# Patient Record
Sex: FEMALE | URBAN - METROPOLITAN AREA
[De-identification: names, ages, dates, MRNs, and addresses within clinical notes are randomized per-mention and may not be internally consistent; named-entity substitution may affect disease eponyms.]

---

## 2019-03-24 ENCOUNTER — INPATIENT (INPATIENT)
Facility: HOSPITAL | Age: 84
LOS: 0 days | Discharge: HOME | End: 2019-03-25
Attending: SURGERY | Admitting: SURGERY
Payer: MEDICARE

## 2019-03-24 VITALS
HEART RATE: 108 BPM | RESPIRATION RATE: 17 BRPM | DIASTOLIC BLOOD PRESSURE: 106 MMHG | OXYGEN SATURATION: 98 % | SYSTOLIC BLOOD PRESSURE: 167 MMHG | TEMPERATURE: 98 F | HEIGHT: 62 IN | WEIGHT: 139.99 LBS

## 2019-03-24 DIAGNOSIS — S00.81XA ABRASION OF OTHER PART OF HEAD, INITIAL ENCOUNTER: ICD-10-CM
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
## 2019-03-24 DIAGNOSIS — I60.9 NONTRAUMATIC SUBARACHNOID HEMORRHAGE, UNSPECIFIED: ICD-10-CM

## 2019-03-24 DIAGNOSIS — I10 ESSENTIAL (PRIMARY) HYPERTENSION: ICD-10-CM

## 2019-03-24 LAB
ABO RH CONFIRMATION: SIGNIFICANT CHANGE UP
ALBUMIN SERPL ELPH-MCNC: 3.6 G/DL — SIGNIFICANT CHANGE UP (ref 3.3–5)
ALP SERPL-CCNC: 82 U/L — SIGNIFICANT CHANGE UP (ref 40–120)
ALT FLD-CCNC: 25 U/L — SIGNIFICANT CHANGE UP (ref 12–78)
ANION GAP SERPL CALC-SCNC: 7 MMOL/L — SIGNIFICANT CHANGE UP (ref 5–17)
APPEARANCE UR: CLEAR — SIGNIFICANT CHANGE UP
APTT BLD: 23.6 SEC — LOW (ref 27.5–36.3)
AST SERPL-CCNC: 35 U/L — SIGNIFICANT CHANGE UP (ref 15–37)
BACTERIA # UR AUTO: ABNORMAL
BASOPHILS # BLD AUTO: 0.03 K/UL — SIGNIFICANT CHANGE UP (ref 0–0.2)
BASOPHILS NFR BLD AUTO: 0.4 % — SIGNIFICANT CHANGE UP (ref 0–2)
BILIRUB SERPL-MCNC: 0.5 MG/DL — SIGNIFICANT CHANGE UP (ref 0.2–1.2)
BILIRUB UR-MCNC: NEGATIVE — SIGNIFICANT CHANGE UP
BLD GP AB SCN SERPL QL: SIGNIFICANT CHANGE UP
BUN SERPL-MCNC: 11 MG/DL — SIGNIFICANT CHANGE UP (ref 7–23)
CALCIUM SERPL-MCNC: 8.6 MG/DL — SIGNIFICANT CHANGE UP (ref 8.5–10.1)
CHLORIDE SERPL-SCNC: 103 MMOL/L — SIGNIFICANT CHANGE UP (ref 96–108)
CO2 SERPL-SCNC: 29 MMOL/L — SIGNIFICANT CHANGE UP (ref 22–31)
COLOR SPEC: YELLOW — SIGNIFICANT CHANGE UP
CREAT SERPL-MCNC: 0.64 MG/DL — SIGNIFICANT CHANGE UP (ref 0.5–1.3)
DIFF PNL FLD: ABNORMAL
EOSINOPHIL # BLD AUTO: 0.19 K/UL — SIGNIFICANT CHANGE UP (ref 0–0.5)
EOSINOPHIL NFR BLD AUTO: 2.8 % — SIGNIFICANT CHANGE UP (ref 0–6)
EPI CELLS # UR: SIGNIFICANT CHANGE UP
GLUCOSE SERPL-MCNC: 101 MG/DL — HIGH (ref 70–99)
GLUCOSE UR QL: NEGATIVE MG/DL — SIGNIFICANT CHANGE UP
HCT VFR BLD CALC: 38.7 % — SIGNIFICANT CHANGE UP (ref 34.5–45)
HGB BLD-MCNC: 13 G/DL — SIGNIFICANT CHANGE UP (ref 11.5–15.5)
IMM GRANULOCYTES NFR BLD AUTO: 0.4 % — SIGNIFICANT CHANGE UP (ref 0–1.5)
INR BLD: 1.07 RATIO — SIGNIFICANT CHANGE UP (ref 0.88–1.16)
KETONES UR-MCNC: NEGATIVE — SIGNIFICANT CHANGE UP
LEUKOCYTE ESTERASE UR-ACNC: ABNORMAL
LYMPHOCYTES # BLD AUTO: 1.57 K/UL — SIGNIFICANT CHANGE UP (ref 1–3.3)
LYMPHOCYTES # BLD AUTO: 23.5 % — SIGNIFICANT CHANGE UP (ref 13–44)
MCHC RBC-ENTMCNC: 30.6 PG — SIGNIFICANT CHANGE UP (ref 27–34)
MCHC RBC-ENTMCNC: 33.6 GM/DL — SIGNIFICANT CHANGE UP (ref 32–36)
MCV RBC AUTO: 91.1 FL — SIGNIFICANT CHANGE UP (ref 80–100)
MONOCYTES # BLD AUTO: 0.55 K/UL — SIGNIFICANT CHANGE UP (ref 0–0.9)
MONOCYTES NFR BLD AUTO: 8.2 % — SIGNIFICANT CHANGE UP (ref 2–14)
NEUTROPHILS # BLD AUTO: 4.31 K/UL — SIGNIFICANT CHANGE UP (ref 1.8–7.4)
NEUTROPHILS NFR BLD AUTO: 64.7 % — SIGNIFICANT CHANGE UP (ref 43–77)
NITRITE UR-MCNC: POSITIVE
NRBC # BLD: 0 /100 WBCS — SIGNIFICANT CHANGE UP (ref 0–0)
PH UR: 8 — SIGNIFICANT CHANGE UP (ref 5–8)
PLATELET # BLD AUTO: 253 K/UL — SIGNIFICANT CHANGE UP (ref 150–400)
POTASSIUM SERPL-MCNC: 3.6 MMOL/L — SIGNIFICANT CHANGE UP (ref 3.5–5.3)
POTASSIUM SERPL-SCNC: 3.6 MMOL/L — SIGNIFICANT CHANGE UP (ref 3.5–5.3)
PROT SERPL-MCNC: 7.6 GM/DL — SIGNIFICANT CHANGE UP (ref 6–8.3)
PROT UR-MCNC: 30 MG/DL
PROTHROM AB SERPL-ACNC: 11.9 SEC — SIGNIFICANT CHANGE UP (ref 10–12.9)
RBC # BLD: 4.25 M/UL — SIGNIFICANT CHANGE UP (ref 3.8–5.2)
RBC # FLD: 13.1 % — SIGNIFICANT CHANGE UP (ref 10.3–14.5)
RBC CASTS # UR COMP ASSIST: ABNORMAL /HPF (ref 0–4)
SODIUM SERPL-SCNC: 139 MMOL/L — SIGNIFICANT CHANGE UP (ref 135–145)
SP GR SPEC: 1.01 — SIGNIFICANT CHANGE UP (ref 1.01–1.02)
TYPE + AB SCN PNL BLD: SIGNIFICANT CHANGE UP
UROBILINOGEN FLD QL: NEGATIVE MG/DL — SIGNIFICANT CHANGE UP
WBC # BLD: 6.68 K/UL — SIGNIFICANT CHANGE UP (ref 3.8–10.5)
WBC # FLD AUTO: 6.68 K/UL — SIGNIFICANT CHANGE UP (ref 3.8–10.5)
WBC UR QL: SIGNIFICANT CHANGE UP

## 2019-03-24 PROCEDURE — 74176 CT ABD & PELVIS W/O CONTRAST: CPT | Mod: 26

## 2019-03-24 PROCEDURE — 99291 CRITICAL CARE FIRST HOUR: CPT

## 2019-03-24 PROCEDURE — 71250 CT THORAX DX C-: CPT | Mod: 26

## 2019-03-24 PROCEDURE — 70496 CT ANGIOGRAPHY HEAD: CPT | Mod: 26

## 2019-03-24 PROCEDURE — 70486 CT MAXILLOFACIAL W/O DYE: CPT | Mod: 26

## 2019-03-24 PROCEDURE — 70498 CT ANGIOGRAPHY NECK: CPT | Mod: 26

## 2019-03-24 PROCEDURE — 72125 CT NECK SPINE W/O DYE: CPT | Mod: 26

## 2019-03-24 PROCEDURE — 99223 1ST HOSP IP/OBS HIGH 75: CPT

## 2019-03-24 PROCEDURE — 93010 ELECTROCARDIOGRAM REPORT: CPT

## 2019-03-24 PROCEDURE — 70450 CT HEAD/BRAIN W/O DYE: CPT | Mod: 26,77

## 2019-03-24 PROCEDURE — 70450 CT HEAD/BRAIN W/O DYE: CPT | Mod: 26,59

## 2019-03-24 RX ORDER — BACITRACIN ZINC 500 UNIT/G
1 OINTMENT IN PACKET (EA) TOPICAL
Refills: 0 | Status: DISCONTINUED | OUTPATIENT
Start: 2019-03-24 | End: 2019-03-25

## 2019-03-24 RX ORDER — NICARDIPINE HYDROCHLORIDE 30 MG/1
5 CAPSULE, EXTENDED RELEASE ORAL
Qty: 40 | Refills: 0 | Status: DISCONTINUED | OUTPATIENT
Start: 2019-03-24 | End: 2019-03-24

## 2019-03-24 RX ORDER — ONDANSETRON 8 MG/1
4 TABLET, FILM COATED ORAL EVERY 6 HOURS
Refills: 0 | Status: DISCONTINUED | OUTPATIENT
Start: 2019-03-24 | End: 2019-03-25

## 2019-03-24 RX ORDER — METOPROLOL TARTRATE 50 MG
5 TABLET ORAL ONCE
Refills: 0 | Status: COMPLETED | OUTPATIENT
Start: 2019-03-24 | End: 2019-03-24

## 2019-03-24 RX ORDER — HYDRALAZINE HCL 50 MG
10 TABLET ORAL EVERY 6 HOURS
Refills: 0 | Status: DISCONTINUED | OUTPATIENT
Start: 2019-03-24 | End: 2019-03-25

## 2019-03-24 RX ORDER — ACETAMINOPHEN 500 MG
650 TABLET ORAL EVERY 6 HOURS
Refills: 0 | Status: DISCONTINUED | OUTPATIENT
Start: 2019-03-24 | End: 2019-03-25

## 2019-03-24 RX ORDER — SODIUM CHLORIDE 9 MG/ML
1000 INJECTION INTRAMUSCULAR; INTRAVENOUS; SUBCUTANEOUS
Refills: 0 | Status: DISCONTINUED | OUTPATIENT
Start: 2019-03-24 | End: 2019-03-25

## 2019-03-24 RX ORDER — TETANUS TOXOID, REDUCED DIPHTHERIA TOXOID AND ACELLULAR PERTUSSIS VACCINE, ADSORBED 5; 2.5; 8; 8; 2.5 [IU]/.5ML; [IU]/.5ML; UG/.5ML; UG/.5ML; UG/.5ML
0.5 SUSPENSION INTRAMUSCULAR ONCE
Refills: 0 | Status: COMPLETED | OUTPATIENT
Start: 2019-03-24 | End: 2019-03-24

## 2019-03-24 RX ORDER — SODIUM CHLORIDE 9 MG/ML
1000 INJECTION INTRAMUSCULAR; INTRAVENOUS; SUBCUTANEOUS ONCE
Refills: 0 | Status: COMPLETED | OUTPATIENT
Start: 2019-03-24 | End: 2019-03-24

## 2019-03-24 RX ADMIN — TETANUS TOXOID, REDUCED DIPHTHERIA TOXOID AND ACELLULAR PERTUSSIS VACCINE, ADSORBED 0.5 MILLILITER(S): 5; 2.5; 8; 8; 2.5 SUSPENSION INTRAMUSCULAR at 16:36

## 2019-03-24 RX ADMIN — Medication 1 APPLICATION(S): at 20:34

## 2019-03-24 RX ADMIN — ONDANSETRON 4 MILLIGRAM(S): 8 TABLET, FILM COATED ORAL at 20:24

## 2019-03-24 RX ADMIN — SODIUM CHLORIDE 1000 MILLILITER(S): 9 INJECTION INTRAMUSCULAR; INTRAVENOUS; SUBCUTANEOUS at 15:13

## 2019-03-24 RX ADMIN — SODIUM CHLORIDE 75 MILLILITER(S): 9 INJECTION INTRAMUSCULAR; INTRAVENOUS; SUBCUTANEOUS at 20:23

## 2019-03-24 RX ADMIN — SODIUM CHLORIDE 1000 MILLILITER(S): 9 INJECTION INTRAMUSCULAR; INTRAVENOUS; SUBCUTANEOUS at 16:13

## 2019-03-24 RX ADMIN — Medication 10 MILLIGRAM(S): at 21:10

## 2019-03-24 RX ADMIN — Medication 5 MILLIGRAM(S): at 17:21

## 2019-03-24 NOTE — ED PROVIDER NOTE - SKIN, MLM
Skin normal color for race, warm, dry. + laceration above left eye, v-shaped 3cm. 1 cm avulsion on top of laceration. 2cm diameter abrasion on left cheek. 1 cm diameter abrasion on left patella

## 2019-03-24 NOTE — ED ADULT NURSE REASSESSMENT NOTE - NS ED NURSE REASSESS COMMENT FT1
MD [facility] Plastic Surgery completed suturing patient's laceration at 1840. HIRO Hylton came down to retrieve pt to bring to ICU, transport at bedside. Pt on monitor. Going up to ICU now. VSS.
Pt awaiting admission to ICU. ICU RN to call ED RN back after settling a new pt in, 1st attempt at 1725. Pt given metoprolol IVP as per orders. PT toileted and positioned. Pt's VS currently stable. Pt aware of POC to be admitted to ICU. Pt's L eye slightly sluggish, pt states this is chronic and she's been seeing an eye doctor since she was little d/t this condition. Son at bedside not sure of condition. Pt currently A+Ox2. Pt has equal movement of all 4 extremities. No neurological deficits in extremities or muscle weakness. Pt without any facial droop currently.
Pt brought to CT scan at 1440, however scan delayed by code stroke which was initiated at the same time as pt being brought over to CT scan. Pt went on the CT scan table at 1500, however pt began saying she had to urinate and demanded to be brought back to the room to use the bed pan. Pt assisted with the bedpan. Pt allowed RN to bring her back to CT scan at 1535 after being cleaned up. MD Fordeill aware of delay. RN to continue to monitor.

## 2019-03-24 NOTE — ED ADULT TRIAGE NOTE - CHIEF COMPLAINT QUOTE
pt BIBEMS from home s/p fall on sidewalk, + head strike, unknown loc. + abrasion to L cheek, L upper lip. lac above L eye, bleeding controlled. pt cannot recall falling, neighbors called EMS. pt a&ox2, disoriented to time, forgetful w/ repetitive questions. unknown blood thinners. c/o pain to L upper lip. trauma alert activated upon arrival.

## 2019-03-24 NOTE — ED PROVIDER NOTE - EYES, MLM
Clear bilaterally, pupils equal, round and reactive to light. EOMI. +at baseline, cannot look to the left past midline with her left eye

## 2019-03-24 NOTE — ED ADULT NURSE NOTE - NSIMPLEMENTINTERV_GEN_ALL_ED
Implemented All Fall with Harm Risk Interventions:  [place] to call system. Call bell, personal items and telephone within reach. Instruct patient to call for assistance. Room bathroom lighting operational. Non-slip footwear when patient is off stretcher. Physically safe environment: no spills, clutter or unnecessary equipment. Stretcher in lowest position, wheels locked, appropriate side rails in place. Provide visual cue, wrist band, yellow gown, etc. Monitor gait and stability. Monitor for mental status changes and reorient to person, place, and time. Review medications for side effects contributing to fall risk. Reinforce activity limits and safety measures with patient and family. Provide visual clues: red socks.

## 2019-03-24 NOTE — CONSULT NOTE ADULT - SUBJECTIVE AND OBJECTIVE BOX
HPI:  Patient is a 82 y/o female with a PMHx of HTN presents to the ED s/p fall today. Pt states that she was walking to her car in her driveway when she fell onto her face. GCS 15 E:4 M:6 V5. Patient is unsure if she had LOC. She is unsure if she passed out prior to her fall or if she tripped. She states the next thing she remembered was her neighbor coming over and helping her into the house, and called 911.  Pt presenting with multiple abrasions on her face and a laceration above left eye. Pt states that she does not have a hx of falls and has never had this type of fall before. She endorses L knee pain, + dizziness. She denies HA, n/v, numb/ ting, weakness, visual changes.. Denies ASA or DOAC use.      PAST MEDICAL & SURGICAL HISTORY:  HTN    FAMILY HISTORY: non-contributory     Social Hx:  Nonsmoker, no drug or alcohol use    MEDICATIONS  (STANDING):  diphtheria/tetanus/pertussis (acellular) Vaccine (ADAcel) 0.5 milliLiter(s) IntraMuscular once  niCARdipine Infusion 5 mG/Hr (25 mL/Hr) IV Continuous <Continuous>       Allergies  Allergy Status Unknown  Intolerances      ROS: Pertinent positives in HPI, all other ROS were reviewed and are negative.      Vital Signs Last 24 Hrs  T(C): 36.7 (24 Mar 2019 16:05), Max: 36.8 (24 Mar 2019 14:14)  T(F): 98.1 (24 Mar 2019 16:05), Max: 98.3 (24 Mar 2019 14:14)  HR: 96 (24 Mar 2019 16:05) (96 - 108)  BP: 167/97 (24 Mar 2019 16:05) (167/97 - 167/106)  BP(mean): --  RR: 18 (24 Mar 2019 16:05) (17 - 18)  SpO2: 99% (24 Mar 2019 16:05) (98% - 99%)      PHYSICAL EXAM:  Constitutional: awake and alert  HEENT: PERRL, L eye strabismus since age 8, + head wrap for L forehead laceration, L periorbital ecchymosis, facial abrasions  Neck: Supple  Respiratory: Breath sounds are clear bilaterally  Cardiovascular: S1 and S2, regular rhythm  Gastrointestinal: soft, nontender  Extremities:  no edema  Vascular: Caritid Bruit - no  Musculoskeletal: + abrasion L knee, mild tenderness to palpation, no abnormal movements  Skin: L forehead laceration, L periorbital ecchymosis, L perioral, L cheek abrasion     Neurological exam:  HF: A x O x 2-3 unsure on year. Appropriately interactive, normal affect. Speech fluent, No Aphasia or paraphasic errors. Naming /repetition intact   CN: LIZA, L eye stabismus/gaze palsy, VFF, facial sensation normal, no NLFD, tongue midline, Palate moves equally, SCM equal bilaterally  Motor: No pronator drift, Strength 5/5 in all 4 ext, normal bulk and tone, no tremor, rigidity or bradykinesia.    Sens: Intact to light touch   Reflexes: Symmetric and normal, downgoing toes b/l  Coord:  No FNFA, dysmetria, LUMA intact   Gait/Balance: Cannot test        Labs:                        13.0   6.68  )-----------( 253      ( 24 Mar 2019 14:29 )             38.7     03-24    139  |  103  |  11  ----------------------------<  101<H>  3.6   |  29  |  0.64    Ca    8.6      24 Mar 2019 14:29    TPro  7.6  /  Alb  3.6  /  TBili  0.5  /  DBili  x   /  AST  35  /  ALT  25  /  AlkPhos  82  03-24        PT/INR - ( 24 Mar 2019 14:29 )   PT: 11.9 sec;   INR: 1.07 ratio         PTT - ( 24 Mar 2019 14:29 )  PTT:23.6 sec    Radiology report:  CT Head No Cont (03.24.19 @ 15:42)  There is evidence of subarachnoid hemorrhage seen involving the right   sylvian fissure as well as right frontal sulci. Subarachnoid hemorrhage   is also seen involving the anterior interhemispheric region. This finding   could be related to patient's known trauma though the possibility of a   right MCA aneurysm rupture cannot be entirely excluded. Clinical   correlation is recommended. CTA or MRA the [place] can be done for   further evaluation if clinically indicated. HPI:  Patient is a 82 y/o female with a PMHx of HTN presents to the ED s/p fall today. Pt states that she was walking to her car in her driveway when she fell onto her face. GCS 15 E:4 M:6 V5. Patient is unsure if she had LOC. She is unsure if she passed out prior to her fall or if she tripped. She states the next thing she remembered was her neighbor coming over and helping her into the house, and called 911.  Pt presenting with multiple abrasions on her face and a laceration above left eye. Pt states that she does not have a hx of falls and has never had this type of fall before. She endorses L knee pain, + dizziness. She denies HA, n/v, numb/ ting, weakness, visual changes.. Denies ASA or DOAC use.      PAST MEDICAL & SURGICAL HISTORY:  HTN    FAMILY HISTORY: non-contributory     Social Hx:  Nonsmoker, no drug or alcohol use    MEDICATIONS  (STANDING):  diphtheria/tetanus/pertussis (acellular) Vaccine (ADAcel) 0.5 milliLiter(s) IntraMuscular once  niCARdipine Infusion 5 mG/Hr (25 mL/Hr) IV Continuous <Continuous>    Home Med: Amlodipine/Valsartan 5-160mg 1/2 tablet Qpm    Allergies  Allergy Status Unknown  Intolerances      ROS: Pertinent positives in HPI, all other ROS were reviewed and are negative.      Vital Signs Last 24 Hrs  T(C): 36.7 (24 Mar 2019 16:05), Max: 36.8 (24 Mar 2019 14:14)  T(F): 98.1 (24 Mar 2019 16:05), Max: 98.3 (24 Mar 2019 14:14)  HR: 96 (24 Mar 2019 16:05) (96 - 108)  BP: 167/97 (24 Mar 2019 16:05) (167/97 - 167/106)  BP(mean): --  RR: 18 (24 Mar 2019 16:05) (17 - 18)  SpO2: 99% (24 Mar 2019 16:05) (98% - 99%)      PHYSICAL EXAM:  Constitutional: awake and alert  HEENT: PERRL, L eye strabismus since age 8, + head wrap for L forehead laceration, L periorbital ecchymosis, facial abrasions  Neck: Supple  Respiratory: Breath sounds are clear bilaterally  Cardiovascular: S1 and S2, regular rhythm  Gastrointestinal: soft, nontender  Extremities:  no edema  Vascular: Caritid Bruit - no  Musculoskeletal: + abrasion L knee, mild tenderness to palpation, no abnormal movements  Skin: L forehead laceration, L periorbital ecchymosis, L perioral, L cheek abrasion     Neurological exam:  HF: A x O x 2-3 unsure on year. Appropriately interactive, normal affect. Speech fluent, No Aphasia or paraphasic errors. Naming /repetition intact   CN: LIZA, L eye stabismus/gaze palsy, VFF, facial sensation normal, no NLFD, tongue midline, Palate moves equally, SCM equal bilaterally  Motor: No pronator drift, Strength 5/5 in all 4 ext, normal bulk and tone, no tremor, rigidity or bradykinesia.    Sens: Intact to light touch   Reflexes: Symmetric and normal, downgoing toes b/l  Coord:  No FNFA, dysmetria, LUMA intact   Gait/Balance: Cannot test        Labs:                        13.0   6.68  )-----------( 253      ( 24 Mar 2019 14:29 )             38.7     03-24    139  |  103  |  11  ----------------------------<  101<H>  3.6   |  29  |  0.64    Ca    8.6      24 Mar 2019 14:29    TPro  7.6  /  Alb  3.6  /  TBili  0.5  /  DBili  x   /  AST  35  /  ALT  25  /  AlkPhos  82  03-24        PT/INR - ( 24 Mar 2019 14:29 )   PT: 11.9 sec;   INR: 1.07 ratio         PTT - ( 24 Mar 2019 14:29 )  PTT:23.6 sec    Radiology report:  CT Head No Cont (03.24.19 @ 15:42)  There is evidence of subarachnoid hemorrhage seen involving the right   sylvian fissure as well as right frontal sulci. Subarachnoid hemorrhage   is also seen involving the anterior interhemispheric region. This finding   could be related to patient's known trauma though the possibility of a   right MCA aneurysm rupture cannot be entirely excluded. Clinical   correlation is recommended. CTA or MRA the [place] can be done for   further evaluation if clinically indicated.

## 2019-03-24 NOTE — ED PROVIDER NOTE - PROGRESS NOTE DETAILS
Lius PEACE for ED attending, Dr. Hart: Spoke with Dr. Alcaraz, trauma surgery, who states pt can be admitted under his service to the ICU. Will contact intensivist. I Rivas Antunez attest that this documentation has been prepared under the direction and in the presence of Doctor Hart.

## 2019-03-24 NOTE — ED ADULT NURSE NOTE - NSFALLRSKHRMRISKTYPE_ED_ALL_ED
Miss Dozier presents for evaluation of abdominal pain. She states the pain is 10/10. She describes the pain as a dull and sharp pain without localization. She reports lower back pain. She denies fever or chills. She reports hematuria but denies dysuria. She denies diarrhea or abdominal distention. She reports hematochezia. She reports being recently admitted for abdominal pain and was discharged in good condition but the pain recurred. She was prescribed Tramadol. 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 She has history of ureteral stricture and underwent ureteral stent placement at [facility] 6/27 with Dr. Bocanegra.  
bones(Osteoporosis,prev fx,steroid use,metastatic bone ca)/coagulation(Bleeding disorder R/T clinical cond/anti-coags)

## 2019-03-24 NOTE — ED PROVIDER NOTE - CARE PLAN
Principal Discharge DX:	SAH (subarachnoid hemorrhage)  Secondary Diagnosis:	Abrasion of forehead, initial encounter

## 2019-03-24 NOTE — ED PROVIDER NOTE - CRITICAL CARE PROVIDED
direct patient care (not related to procedure)/additional history taking/interpretation of diagnostic studies/documentation/consultation with other physicians/conducted a detailed discussion of DNR status/consult w/ pt's family directly relating to pts condition

## 2019-03-24 NOTE — H&P ADULT - HISTORY OF PRESENT ILLNESS
· Chief Complaint: The patient is a 83y Female complaining of fall.	  · HPI Objective Statement: 82 y/o female with a PMHx of HTN presents to the ED s/p fall today. Pt states that she was walking to her car in a parking lot when she fell onto her face. Pt denies LOC, although amnestic to the incident. Pt presenting with multiple abrasions on her face and a laceration above left eye. Pt states that she does not have a hx of falls and has never had this type of fall before. Denies HA. Pt is unsure if her tetanus is UTD.	  · Presenting Symptoms: ABRASION, LACERATION, +multiple abrasions on face, laceration above left eye	  · Duration: today	  · Fall Cause: slipping, stumbling. tripping	  · Relieving Factors: none	  At the time of exam the patient was alert, cooperative, GCS-15.

## 2019-03-24 NOTE — ED ADULT NURSE NOTE - OBJECTIVE STATEMENT
Pt BIBA s/p fall, as per pt, pt was getting out of car and fell out of nowhere. Pt doesn't remember event, +LOC. Pt takes anticoagulation daily, unsure what medication. Pt now presents with +wounds with blood all over face, laceration to upper L lip and L knee abrasion. Pt currently A+Ox2, unsure what year it is. Knows where she is and who she is. See trauma flowsheet.

## 2019-03-24 NOTE — CONSULT NOTE ADULT - ASSESSMENT
Patient is a 84 y/o female with a PMHx of HTN presents to the ED s/p fall today. Possible LOC. HCT revealed R SAH in sylvian fissure, R frontal sulci and anterior interhemispheric region, likely traumatic.    Plan:  - No acute neurosurgical intervention at this time  - SBP <160  - CTA Head and Neck r/o aneurysm  - RHCT in 6 hours  - Trauma consult  - Rec XR L knee r/o fx  - L forehead laceration will need suture repair    Discussed with Dr. Augustin who is in agreement with the plan Patient is a 84 y/o female with a PMHx of HTN presents to the ED s/p fall today. Possible LOC. HCT revealed R SAH in sylvian fissure, R frontal sulci and anterior interhemispheric region, likely traumatic.    Plan:  - No acute neurosurgical intervention at this time  - SBP <160  - Neuro checks Q1hr for now. If RHCT in 6 hrs stable, ok to change to Q2hrs  - CTA Head and Neck r/o aneurysm  - If CTA negative ok for liquid diet, advance as tolerated  - RHCT in 6 hours  - Trauma consult  - L forehead laceration will need suture repair    Discussed with Dr. Augustin who is in agreement with the plan

## 2019-03-24 NOTE — CONSULT NOTE ADULT - ATTENDING COMMENTS
Case reviewed with Neurosurgery PA.  History of fall with GCS=15.  CT Brain Reviewed, shows post traumatic right subarachnoid hemorrhage.  Followup CT brain pending.

## 2019-03-24 NOTE — ED PROVIDER NOTE - OBJECTIVE STATEMENT
82 y/o female with a PMHx of HTN presents to the ED s/p fall today. Pt states that she was walking to her car in a parking lot when she fell onto her face. Pt denies LOC. Pt presenting with multiple abrasions on her face and a laceration above left eye. Pt states that she does not have a hx of falls and has never had this type of fall before. Denies HA. Pt is unsure if her tetanus is UTD.

## 2019-03-25 ENCOUNTER — TRANSCRIPTION ENCOUNTER (OUTPATIENT)
Age: 84
End: 2019-03-25

## 2019-03-25 VITALS
SYSTOLIC BLOOD PRESSURE: 148 MMHG | TEMPERATURE: 98 F | HEART RATE: 91 BPM | RESPIRATION RATE: 17 BRPM | DIASTOLIC BLOOD PRESSURE: 78 MMHG | OXYGEN SATURATION: 99 %

## 2019-03-25 PROCEDURE — 99239 HOSP IP/OBS DSCHRG MGMT >30: CPT

## 2019-03-25 RX ORDER — ACETAMINOPHEN 500 MG
2 TABLET ORAL
Qty: 0 | Refills: 0 | DISCHARGE
Start: 2019-03-25

## 2019-03-25 RX ADMIN — Medication 1 APPLICATION(S): at 06:13

## 2019-03-25 NOTE — DISCHARGE NOTE NURSING/CASE MANAGEMENT/SOCIAL WORK - NSDCPEPTSTRK_GEN_ALL_CORE
Call 911 for stroke/Need for follow up after discharge/Prescribed medications/Risk factors for stroke/Stroke education booklet/Stroke support groups for patients, families, and friends/Stroke warning signs and symptoms/Signs and symptoms of stroke

## 2019-03-25 NOTE — DISCHARGE NOTE PROVIDER - NSDCFUADDINST_GEN_ALL_CORE_FT
Seek medical attention of develops worsening headache, nausea, vomiting, seizure, any focal neurological complaint, Pain not controlled with medication, difficulty breathing or fever. No Heavy lifting, pushing, pulling or excessive physical activity for 4 weeks. Incentive spirometry. Fall precautions. call offices for follow up appointments. follow up with neurosurgery and plastic surgery , Resume home meds, follow up with PMD for HTN. Avoid screens.

## 2019-03-25 NOTE — PHYSICAL THERAPY INITIAL EVALUATION ADULT - PERTINENT HX OF CURRENT PROBLEM, REHAB EVAL
84 yo F admitted post fall at home. Pt states that she was walking to her car in a parking lot when she fell onto her face.  CT head (+) for SAH in R sylvian fissure as well as right frontal sulci..  Repeat CT stable.

## 2019-03-25 NOTE — DISCHARGE NOTE PROVIDER - PROVIDER TOKENS
PROVIDER:[TOKEN:[[id number]]],PROVIDER:[TOKEN:[[id number]]],FREE:[LAST:[PMD],PHONE:[(   )    -],FAX:[(   )    -]]

## 2019-03-25 NOTE — PHYSICAL THERAPY INITIAL EVALUATION ADULT - MODALITIES TREATMENT COMMENTS
Patient independent in functional mobility, no skilled physical therapy need in this setting.  May ambulate per nursing.  Will d/c from PT. RN informed.

## 2019-03-25 NOTE — DISCHARGE NOTE NURSING/CASE MANAGEMENT/SOCIAL WORK - NSDCDPATPORTLINK_GEN_ALL_CORE
You can access the [facility] Portal, offered by [facility], by registering with the following website: [email or web address]

## 2019-03-25 NOTE — DISCHARGE NOTE PROVIDER - NSDCCPCAREPLAN_GEN_ALL_CORE_FT
PRINCIPAL DISCHARGE DIAGNOSIS  Diagnosis: SAH (subarachnoid hemorrhage)  Assessment and Plan of Treatment:       SECONDARY DISCHARGE DIAGNOSES  Diagnosis: Abrasion of forehead, initial encounter  Assessment and Plan of Treatment:

## 2019-03-25 NOTE — PHYSICAL THERAPY INITIAL EVALUATION ADULT - GENERAL OBSERVATIONS, REHAB EVAL
Patient received out of bed in chair in ICU, eating a snack.  Patient denied pain. + multiple abrasions on her face and a laceration above left eye.

## 2019-03-25 NOTE — PROGRESS NOTE ADULT - ASSESSMENT
Patient is a 84 y/o female with a PMHx of HTN presents to the ED s/p fall today. Possible LOC. HCT revealed R SAH in sylvian fissure, R frontal sulci and anterior interhemispheric region, likely traumatic.    Plan:  - No acute neurosurgical intervention at this time  - RHCT stable  - SBP <160  - Ok for neuro checks Q2hr  - CTA Head and Neck neg for aneurysm, dec enhancement L sigmoid poss thrombosis, possibly dominant R sinus  - Patient refusing MRV- states she cannot lay flat due to chronic neck pain  - Ok for outpatient MRV [facility]  - Ok for diet from neurosurgical standpoint  - L forehead and L perioral laceration sutured by plastics in ED  - PT/OOB with assistance    Discussed with Dr. Augustin Patient is a 82 y/o female with a PMHx of HTN presents to the ED s/p fall today. Possible LOC. HCT revealed R SAH in sylvian fissure, R frontal sulci and anterior interhemispheric region, likely traumatic.    Plan:  - No acute neurosurgical intervention at this time  - RHCT stable  - SBP <160  - Ok for neuro checks Q4hr  - CTA Head and Neck neg for aneurysm, dec enhancement L sigmoid poss thrombosis, possibly dominant R sinus  - Patient refusing MRV- states she cannot lay flat due to chronic neck pain  - Ok for outpatient MRV at [facility] on discharge  - Follow up with Dr. Augustin in 3-4 weeks with RHCT, please call upon discharge  - L forehead and L perioral laceration sutured by plastics in ED  - PT/OOB with assistance    Discussed with Dr. Augustin

## 2019-03-25 NOTE — DISCHARGE NOTE PROVIDER - NSDCACTIVITY_GEN_ALL_CORE
Do not drive or operate machinery/Stairs allowed/Walking - Indoors allowed/No heavy lifting/straining/Walking - Outdoors allowed

## 2019-03-25 NOTE — PROGRESS NOTE ADULT - SUBJECTIVE AND OBJECTIVE BOX
HPI:  Patient is a 84 y/o female with a PMHx of HTN presents to the ED s/p fall today. Pt states that she was walking to her car in her driveway when she fell onto her face. GCS 15 E:4 M:6 V5. Patient is unsure if she had LOC. She is unsure if she passed out prior to her fall or if she tripped. She states the next thing she remembered was her neighbor coming over and helping her into the house, and called 911.  Pt presenting with multiple abrasions on her face and a laceration above left eye. Pt states that she does not have a hx of falls and has never had this type of fall before. She endorses L knee pain, + dizziness. She denies HA, n/v, numb/ ting, weakness, visual changes.. Denies ASA or DOAC use.    3/25- Patient seen and examined. No acute events ON. She continues to report some dizziness when turning sides in bed. She denies HA, n/v, visual changes, weakness. RHCT last night stable.    Vital Signs Last 24 Hrs  T(C): 36.9 (25 Mar 2019 04:00), Max: 37.1 (24 Mar 2019 20:00)  T(F): 98.5 (25 Mar 2019 04:00), Max: 98.7 (24 Mar 2019 20:00)  HR: 88 (25 Mar 2019 08:00) (78 - 108)  BP: 138/72 (25 Mar 2019 08:00) (128/73 - 171/79)  BP(mean): 89 (25 Mar 2019 08:00) (80 - 107)  RR: 15 (25 Mar 2019 08:00) (11 - 18)  SpO2: 99% (25 Mar 2019 08:00) (96% - 99%)    MEDICATIONS  (STANDING):  BACItracin   Ointment 1 Application(s) Topical two times a day  sodium chloride 0.9%. 1000 milliLiter(s) (75 mL/Hr) IV Continuous <Continuous>    MEDICATIONS  (PRN):  acetaminophen   Tablet .. 650 milliGRAM(s) Oral every 6 hours PRN Temp greater or equal to 38C (100.4F), Mild Pain (1 - 3), Moderate Pain (4 - 6)  hydrALAZINE Injectable 10 milliGRAM(s) IV Push every 6 hours PRN SBP > 160  ondansetron Injectable 4 milliGRAM(s) IV Push every 6 hours PRN Nausea and/or Vomiting      PHYSICAL EXAM:  Constitutional: awake and alert  HEENT: PERRL, L eye strabismus since age 8, L forehead laceration/L perioral lac sutured by plastics, L periorbital ecchymosis, facial abrasions  Neck: Supple  Respiratory: Breath sounds are clear bilaterally  Cardiovascular: S1 and S2, regular rhythm  Gastrointestinal: soft, nontender  Extremities:  no edema  Vascular: Caritid Bruit - no  Musculoskeletal: + abrasion L knee, mild tenderness to palpation, no abnormal movements  Skin: L forehead laceration, L periorbital ecchymosis, L perioral, L cheek abrasion     Neurological exam:  HF: A x O x 3. Appropriately interactive, normal affect. Speech fluent, No Aphasia or paraphasic errors. Naming /repetition intact   CN: LIZA, L eye stabismus/gaze palsy, VFF, facial sensation normal, no NLFD, tongue midline, Palate moves equally, SCM equal bilaterally  Motor: No pronator drift, Strength 5/5 in all 4 ext, normal bulk and tone, no tremor, rigidity or bradykinesia.    Sens: Intact to light touch   Reflexes: Symmetric and normal, downgoing toes b/l  Coord:  No FNFA, dysmetria, LUMA intact   Gait/Balance: Cannot test              LABS:                         13.0   6.68  )-----------( 253      ( 24 Mar 2019 14:29 )             38.7     03-24    139  |  103  |  11  ----------------------------<  101<H>  3.6   |  29  |  0.64    Ca    8.6      24 Mar 2019 14:29    TPro  7.6  /  Alb  3.6  /  TBili  0.5  /  DBili  x   /  AST  35  /  ALT  25  /  AlkPhos  82  03-24    LIVER FUNCTIONS - ( 24 Mar 2019 14:29 )  Alb: 3.6 g/dL / Pro: 7.6 gm/dL / ALK PHOS: 82 U/L / ALT: 25 U/L / AST: 35 U/L / GGT: x                 RADIOLOGY:  CT Head No Cont (03.24.19 @ 21:54)   IMPRESSION:    moderate periventricular white matter ischemia is   unchanged subarachnoid hemorrhage in the BILATERAL frontal lobes small   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
< from: CT Angio Neck w/ IV Cont (03.24.19 @ 17:18) >  Impression: CT venogram of the neck demonstrates no significant stenosis.    CTA of the [place] demonstrates no evidence of aneurysms or   significant stenosis.    Decrease enhancement is seen involving the left sigmoid sinus as well   left internal jugular vein. This is suspicious for underlying thrombosis.
Patient evaluated in ICU.  GCS=15.  Alert and oriented.  CN 2-12inact  Good power.  CT Brain scans reviewed.  Will follow as out patient.

## 2019-03-25 NOTE — DISCHARGE NOTE PROVIDER - CARE PROVIDER_API CALL
Bimal Augustin)  Neurological Surgery  [address] [place] [address]  Phone: [phone]  Fax: [phone]  Follow Up Time:     Izzy Lopez)  Surgery  [address] [facility], [address]  [place] [address]  Phone: [phone]  Fax: [phone]  Follow Up Time:     PMD,   Phone: (   )    -  Fax: (   )    -  Follow Up Time:
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
## 2019-04-02 DIAGNOSIS — S01.81XA LACERATION WITHOUT FOREIGN BODY OF OTHER PART OF HEAD, INITIAL ENCOUNTER: ICD-10-CM

## 2019-04-02 DIAGNOSIS — S06.5X0A TRAUMATIC SUBDURAL HEMORRHAGE WITHOUT LOSS OF CONSCIOUSNESS, INITIAL ENCOUNTER: ICD-10-CM

## 2019-04-02 DIAGNOSIS — S01.112A LACERATION WITHOUT FOREIGN BODY OF LEFT EYELID AND PERIOCULAR AREA, INITIAL ENCOUNTER: ICD-10-CM
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
## 2019-04-02 DIAGNOSIS — S06.6X0A TRAUMATIC SUBARACHNOID HEMORRHAGE WITHOUT LOSS OF CONSCIOUSNESS, INITIAL ENCOUNTER: ICD-10-CM

## 2019-04-02 DIAGNOSIS — S01.511A LACERATION WITHOUT FOREIGN BODY OF LIP, INITIAL ENCOUNTER: ICD-10-CM

## 2019-04-02 DIAGNOSIS — M54.2 CERVICALGIA: ICD-10-CM

## 2019-04-02 DIAGNOSIS — G89.29 OTHER CHRONIC PAIN: ICD-10-CM

## 2019-04-02 DIAGNOSIS — I10 ESSENTIAL (PRIMARY) HYPERTENSION: ICD-10-CM

## 2020-04-08 NOTE — ED ADULT NURSE NOTE - NSFALLRSKASSESSTYPE_ED_ALL_ED
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  I think that is what he needed.   Initial (On Arrival)

## 2022-06-17 NOTE — H&P ADULT - CONSTITUTIONAL DETAILS
I sent her close to her home as there are no [facility] PT facilities near her.    Edited referral to go to [facility].   well-groomed/no distress

## 2023-01-14 ENCOUNTER — INPATIENT (INPATIENT)
Facility: HOSPITAL | Age: 88
LOS: 5 days | Discharge: NURSING, REHAB OR LONG-TERM CARE | DRG: 871 | End: 2023-01-20
Attending: STUDENT IN AN ORGANIZED HEALTH CARE EDUCATION/TRAINING PROGRAM | Admitting: STUDENT IN AN ORGANIZED HEALTH CARE EDUCATION/TRAINING PROGRAM
Payer: MEDICARE

## 2023-01-14 VITALS
SYSTOLIC BLOOD PRESSURE: 102 MMHG | RESPIRATION RATE: 18 BRPM | TEMPERATURE: 98 F | WEIGHT: 125 LBS | DIASTOLIC BLOOD PRESSURE: 70 MMHG | OXYGEN SATURATION: 97 % | HEIGHT: 62 IN | HEART RATE: 102 BPM

## 2023-01-14 DIAGNOSIS — M25.551 PAIN IN RIGHT HIP: ICD-10-CM

## 2023-01-14 DIAGNOSIS — E87.8 OTHER DISORDERS OF ELECTROLYTE AND FLUID BALANCE, NOT ELSEWHERE CLASSIFIED: ICD-10-CM

## 2023-01-14 DIAGNOSIS — N17.9 ACUTE KIDNEY FAILURE, UNSPECIFIED: ICD-10-CM

## 2023-01-14 DIAGNOSIS — Z29.9 ENCOUNTER FOR PROPHYLACTIC MEASURES, UNSPECIFIED: ICD-10-CM

## 2023-01-14 DIAGNOSIS — N39.0 URINARY TRACT INFECTION, SITE NOT SPECIFIED: ICD-10-CM

## 2023-01-14 DIAGNOSIS — M54.9 DORSALGIA, UNSPECIFIED: ICD-10-CM

## 2023-01-14 DIAGNOSIS — R94.31 ABNORMAL ELECTROCARDIOGRAM [ECG] [EKG]: ICD-10-CM

## 2023-01-14 LAB
ALBUMIN SERPL ELPH-MCNC: 2.9 G/DL — LOW (ref 3.3–5)
ALP SERPL-CCNC: 82 U/L — SIGNIFICANT CHANGE UP (ref 40–120)
ALT FLD-CCNC: 32 U/L — SIGNIFICANT CHANGE UP (ref 12–78)
ANION GAP SERPL CALC-SCNC: 8 MMOL/L — SIGNIFICANT CHANGE UP (ref 5–17)
APPEARANCE UR: ABNORMAL
APTT BLD: 30.1 SEC — SIGNIFICANT CHANGE UP (ref 27.5–35.5)
AST SERPL-CCNC: 56 U/L — HIGH (ref 15–37)
BASOPHILS # BLD AUTO: 0.06 K/UL — SIGNIFICANT CHANGE UP (ref 0–0.2)
BASOPHILS NFR BLD AUTO: 0.2 % — SIGNIFICANT CHANGE UP (ref 0–2)
BILIRUB SERPL-MCNC: 1 MG/DL — SIGNIFICANT CHANGE UP (ref 0.2–1.2)
BILIRUB UR-MCNC: NEGATIVE — SIGNIFICANT CHANGE UP
BUN SERPL-MCNC: 42 MG/DL — HIGH (ref 7–23)
CALCIUM SERPL-MCNC: 8.4 MG/DL — LOW (ref 8.5–10.1)
CHLORIDE SERPL-SCNC: 95 MMOL/L — LOW (ref 96–108)
CK SERPL-CCNC: 888 U/L — HIGH (ref 26–192)
CO2 SERPL-SCNC: 27 MMOL/L — SIGNIFICANT CHANGE UP (ref 22–31)
COLOR SPEC: YELLOW — SIGNIFICANT CHANGE UP
CREAT SERPL-MCNC: 1.8 MG/DL — HIGH (ref 0.5–1.3)
DIFF PNL FLD: ABNORMAL
EGFR: 27 ML/MIN/1.73M2 — LOW
EOSINOPHIL # BLD AUTO: 0 K/UL — SIGNIFICANT CHANGE UP (ref 0–0.5)
EOSINOPHIL NFR BLD AUTO: 0 % — SIGNIFICANT CHANGE UP (ref 0–6)
FLUAV AG NPH QL: SIGNIFICANT CHANGE UP
FLUBV AG NPH QL: SIGNIFICANT CHANGE UP
GLUCOSE SERPL-MCNC: 113 MG/DL — HIGH (ref 70–99)
GLUCOSE UR QL: NEGATIVE — SIGNIFICANT CHANGE UP
HCT VFR BLD CALC: 36.1 % — SIGNIFICANT CHANGE UP (ref 34.5–45)
HGB BLD-MCNC: 12 G/DL — SIGNIFICANT CHANGE UP (ref 11.5–15.5)
IMM GRANULOCYTES NFR BLD AUTO: 1 % — HIGH (ref 0–0.9)
INR BLD: 1.25 RATIO — HIGH (ref 0.88–1.16)
KETONES UR-MCNC: ABNORMAL
LACTATE SERPL-SCNC: 2.3 MMOL/L — HIGH (ref 0.7–2)
LEUKOCYTE ESTERASE UR-ACNC: ABNORMAL
LYMPHOCYTES # BLD AUTO: 1.02 K/UL — SIGNIFICANT CHANGE UP (ref 1–3.3)
LYMPHOCYTES # BLD AUTO: 4 % — LOW (ref 13–44)
MCHC RBC-ENTMCNC: 30.6 PG — SIGNIFICANT CHANGE UP (ref 27–34)
MCHC RBC-ENTMCNC: 33.2 GM/DL — SIGNIFICANT CHANGE UP (ref 32–36)
MCV RBC AUTO: 92.1 FL — SIGNIFICANT CHANGE UP (ref 80–100)
MONOCYTES # BLD AUTO: 1.35 K/UL — HIGH (ref 0–0.9)
MONOCYTES NFR BLD AUTO: 5.3 % — SIGNIFICANT CHANGE UP (ref 2–14)
NEUTROPHILS # BLD AUTO: 22.76 K/UL — HIGH (ref 1.8–7.4)
NEUTROPHILS NFR BLD AUTO: 89.5 % — HIGH (ref 43–77)
NITRITE UR-MCNC: NEGATIVE — SIGNIFICANT CHANGE UP
NRBC # BLD: 0 /100 WBCS — SIGNIFICANT CHANGE UP (ref 0–0)
PH UR: 6.5 — SIGNIFICANT CHANGE UP (ref 5–8)
PLATELET # BLD AUTO: 311 K/UL — SIGNIFICANT CHANGE UP (ref 150–400)
POTASSIUM SERPL-MCNC: 3.2 MMOL/L — LOW (ref 3.5–5.3)
POTASSIUM SERPL-SCNC: 3.2 MMOL/L — LOW (ref 3.5–5.3)
PROT SERPL-MCNC: 7.5 G/DL — SIGNIFICANT CHANGE UP (ref 6–8.3)
PROT UR-MCNC: 100
PROTHROM AB SERPL-ACNC: 14.6 SEC — HIGH (ref 10.5–13.4)
RBC # BLD: 3.92 M/UL — SIGNIFICANT CHANGE UP (ref 3.8–5.2)
RBC # FLD: 14 % — SIGNIFICANT CHANGE UP (ref 10.3–14.5)
RSV RNA NPH QL NAA+NON-PROBE: SIGNIFICANT CHANGE UP
SARS-COV-2 RNA SPEC QL NAA+PROBE: SIGNIFICANT CHANGE UP
SODIUM SERPL-SCNC: 130 MMOL/L — LOW (ref 135–145)
SP GR SPEC: 1.01 — SIGNIFICANT CHANGE UP (ref 1.01–1.02)
UROBILINOGEN FLD QL: NEGATIVE — SIGNIFICANT CHANGE UP
WBC # BLD: 25.44 K/UL — HIGH (ref 3.8–10.5)
WBC # FLD AUTO: 25.44 K/UL — HIGH (ref 3.8–10.5)

## 2023-01-14 PROCEDURE — 99222 1ST HOSP IP/OBS MODERATE 55: CPT | Mod: GC

## 2023-01-14 PROCEDURE — 76377 3D RENDER W/INTRP POSTPROCES: CPT

## 2023-01-14 PROCEDURE — 72100 X-RAY EXAM L-S SPINE 2/3 VWS: CPT | Mod: 26

## 2023-01-14 PROCEDURE — 72128 CT CHEST SPINE W/O DYE: CPT | Mod: 26,MA

## 2023-01-14 PROCEDURE — 76376 3D RENDER W/INTRP POSTPROCES: CPT | Mod: 26

## 2023-01-14 PROCEDURE — 71045 X-RAY EXAM CHEST 1 VIEW: CPT | Mod: 26

## 2023-01-14 PROCEDURE — 93010 ELECTROCARDIOGRAM REPORT: CPT

## 2023-01-14 PROCEDURE — 73562 X-RAY EXAM OF KNEE 3: CPT | Mod: 26,LT

## 2023-01-14 PROCEDURE — 72192 CT PELVIS W/O DYE: CPT | Mod: 26,MA

## 2023-01-14 PROCEDURE — 70450 CT HEAD/BRAIN W/O DYE: CPT | Mod: 26,MA

## 2023-01-14 PROCEDURE — 73502 X-RAY EXAM HIP UNI 2-3 VIEWS: CPT | Mod: 26,LT

## 2023-01-14 PROCEDURE — 99285 EMERGENCY DEPT VISIT HI MDM: CPT | Mod: FS,CS

## 2023-01-14 PROCEDURE — 72125 CT NECK SPINE W/O DYE: CPT | Mod: 26,MA

## 2023-01-14 RX ORDER — PIPERACILLIN AND TAZOBACTAM 4; .5 G/20ML; G/20ML
3.38 INJECTION, POWDER, LYOPHILIZED, FOR SOLUTION INTRAVENOUS ONCE
Refills: 0 | Status: COMPLETED | OUTPATIENT
Start: 2023-01-14 | End: 2023-01-14

## 2023-01-14 RX ORDER — AMLODIPINE BESYLATE 2.5 MG/1
2.5 TABLET ORAL EVERY 24 HOURS
Refills: 0 | Status: DISCONTINUED | OUTPATIENT
Start: 2023-01-14 | End: 2023-01-15

## 2023-01-14 RX ORDER — SODIUM CHLORIDE 9 MG/ML
1000 INJECTION INTRAMUSCULAR; INTRAVENOUS; SUBCUTANEOUS ONCE
Refills: 0 | Status: COMPLETED | OUTPATIENT
Start: 2023-01-14 | End: 2023-01-14

## 2023-01-14 RX ORDER — VALSARTAN 80 MG/1
80 TABLET ORAL DAILY
Refills: 0 | Status: DISCONTINUED | OUTPATIENT
Start: 2023-01-14 | End: 2023-01-15

## 2023-01-14 RX ORDER — ONDANSETRON 8 MG/1
4 TABLET, FILM COATED ORAL EVERY 8 HOURS
Refills: 0 | Status: DISCONTINUED | OUTPATIENT
Start: 2023-01-14 | End: 2023-01-20

## 2023-01-14 RX ORDER — ACETAMINOPHEN 500 MG
650 TABLET ORAL EVERY 6 HOURS
Refills: 0 | Status: DISCONTINUED | OUTPATIENT
Start: 2023-01-14 | End: 2023-01-20

## 2023-01-14 RX ORDER — LANOLIN ALCOHOL/MO/W.PET/CERES
3 CREAM (GRAM) TOPICAL AT BEDTIME
Refills: 0 | Status: DISCONTINUED | OUTPATIENT
Start: 2023-01-14 | End: 2023-01-20

## 2023-01-14 RX ORDER — ACETAMINOPHEN 500 MG
650 TABLET ORAL ONCE
Refills: 0 | Status: COMPLETED | OUTPATIENT
Start: 2023-01-14 | End: 2023-01-14

## 2023-01-14 RX ORDER — SODIUM CHLORIDE 9 MG/ML
500 INJECTION INTRAMUSCULAR; INTRAVENOUS; SUBCUTANEOUS ONCE
Refills: 0 | Status: COMPLETED | OUTPATIENT
Start: 2023-01-14 | End: 2023-01-14

## 2023-01-14 RX ADMIN — Medication 650 MILLIGRAM(S): at 23:21

## 2023-01-14 RX ADMIN — Medication 650 MILLIGRAM(S): at 23:27

## 2023-01-14 RX ADMIN — SODIUM CHLORIDE 500 MILLILITER(S): 9 INJECTION INTRAMUSCULAR; INTRAVENOUS; SUBCUTANEOUS at 21:52

## 2023-01-14 RX ADMIN — PIPERACILLIN AND TAZOBACTAM 200 GRAM(S): 4; .5 INJECTION, POWDER, LYOPHILIZED, FOR SOLUTION INTRAVENOUS at 21:00

## 2023-01-14 RX ADMIN — PIPERACILLIN AND TAZOBACTAM 3.38 GRAM(S): 4; .5 INJECTION, POWDER, LYOPHILIZED, FOR SOLUTION INTRAVENOUS at 21:52

## 2023-01-14 RX ADMIN — SODIUM CHLORIDE 1000 MILLILITER(S): 9 INJECTION INTRAMUSCULAR; INTRAVENOUS; SUBCUTANEOUS at 19:10

## 2023-01-14 RX ADMIN — SODIUM CHLORIDE 1000 MILLILITER(S): 9 INJECTION INTRAMUSCULAR; INTRAVENOUS; SUBCUTANEOUS at 21:52

## 2023-01-14 NOTE — ED ADULT TRIAGE NOTE - CHIEF COMPLAINT QUOTE
Patient A/Ox2 (self and situation). BIBA from home with son s/p trip and fall from sliding off toilet to the floor. Was helped up by EMS at the time but declined transport. Today comes in for inability to walk. C/o left hip pain. Denies head strike, LOC or AC therapy.

## 2023-01-14 NOTE — H&P ADULT - PROBLEM SELECTOR PLAN 6
heparin for dvt prophylaxis 102/70 BP in ED  - hold home almodipine valsartan due to LEIGH and soft BP

## 2023-01-14 NOTE — H&P ADULT - NSHPREVIEWOFSYSTEMS_GEN_ALL_CORE
CONSTITUTIONAL: denies fever, chills, fatigue, weakness  HEENT: denies blurred vision, sore throat  SKIN: denies new lesions, rash  CARDIOVASCULAR: denies chest pain, chest pressure, palpitations  RESPIRATORY: denies shortness of breath, sputum production  GASTROINTESTINAL: denies nausea, vomiting, diarrhea, abdominal pain  GENITOURINARY: denies dysuria, discharge  NEUROLOGICAL: denies numbness, headache, focal weakness  MUSCULOSKELETAL: endorses back pain and right hip pain  HEMATOLOGIC: denies gross bleeding, bruising  PSYCHIATRIC: denies recent changes in anxiety, depression  ENDOCRINOLOGIC: denies sweating, cold or heat intolerance

## 2023-01-14 NOTE — H&P ADULT - NSHPSOCIALHISTORY_GEN_ALL_CORE
Pt lives alone in a 1st floor apartment without any stairs. Pt is fully independent and her 2 sons only come to help out with groceries. Pt denies alcohol use. Pt has a remote smoking history in her 20's. Pt uses a cane to ambulate outside the house only on occasion. pt was vaccinated for covid 4 times with moderna.

## 2023-01-14 NOTE — ED ADULT NURSE NOTE - OBJECTIVE STATEMENT
a/ox4 patient came to ED c.o weakness x2 days with a fall today. Patient lives at home alone and was bought in by her two sons. Patient has hx of UTI's with similar symptoms. Afebrile. No n/.v.d, Patient pending further labs and radiology reports. Will cont. to monitor.

## 2023-01-14 NOTE — H&P ADULT - PROBLEM SELECTOR PLAN 4
EKG: sinus tachycardia with premature atrial complexes, left axis deviation  - likely 2/2 to UTI EKG: sinus tachycardia with premature atrial complexes, left axis deviation  - likely 2/2 to UTI  -monitor on remote telemetry

## 2023-01-14 NOTE — H&P ADULT - NSICDXFAMILYHX_GEN_ALL_CORE_FT
FAMILY HISTORY:  Mother  Still living? Unknown  FHx: diabetes mellitus, Age at diagnosis: Age Unknown  FHx: hypertension, Age at diagnosis: Age Unknown

## 2023-01-14 NOTE — ED PROVIDER NOTE - CLINICAL SUMMARY MEDICAL DECISION MAKING FREE TEXT BOX
Patient is an 87-year-old female with a history of HTN, forgetfulness and dementia who became tremulous and fell last night.  There was no loss of consciousness reported.  Patient refused to come to the hospital that, but this a.m. patient was still unable to ambulate was very tremulous and shaky.  So son called the ambulance this afternoon to bring her to the hospital.  He endorses after review of the laboratory studies which demonstrate a urinary tract infection with a very high elevated white count the patient is susceptible to urinary tract infections and has had similar symptoms but not so severe in the past.    On evaluation is an elderly female who is hard of hearing, in no distress.  Awake alert and oriented to self.  Cooperative.  HEENT normocephalic atraumatic, mucous membranes are moist.  Without G/F/R.  Neck is supple.  Heart lung exam is normal.  Abdominal exam is soft and nontender no guarding or rebound.  Musculoskeletal exam patient has discomfort to palpation over the left hip with full range of motion both actively and passively.  The extremities are neurologic and vascular intact.  She also endorses pain to her left shoulder.  But there is full range of motion without evidence of trauma.  The neurologic exam patient is hard of hearing, slightly confused but is fully cooperative and is able to participate in the exam, with an NIH stroke score of 1.  GCS of 15.  Plan of care includes CT imaging cultures lactate UA CNS IV fluids IV antibiotics for urinary tract infection and urosepsis.  IV Tylenol for the pain, and admission to the hospital for the inability to ambulate dehydration and other derangements.  Copies of the results were provided to the son at the bedside, plan of care was reviewed with the son who agrees with admission.

## 2023-01-14 NOTE — H&P ADULT - PROBLEM SELECTOR PLAN 1
Pt was found to have a UTI on admission  - 25.44 WBC, 2.3 lactate, 888 Creatinine Kinase, Moderate Leukocyte esterase, 26-50 WBC   - s/p Zosyn in ED  - f/u Ucx, f/u Bcx  - f/u repeat lactate  - Continue Zosyn

## 2023-01-14 NOTE — ED PROVIDER NOTE - PHYSICAL EXAMINATION
Constitutional: Awake, Alert, non-toxic. NAD  HEAD: Normocephalic, atraumatic.   EYES: PERRL, EOM intact, conjunctiva and sclera are clear bilaterally. No raccoon eyes.   ENT: No dial sign. No rhinorrhea, normal pharynx, patent, no tonsillar exudate or enlargement, mucous membranes pink/moist, no erythema, no drooling or stridor.   NECK: Supple, non-tender  BACK: (+) mild thoracic TTP. No midline or paraspinal TTP of cervical/lumbar spine, FROM. No ecchymosis or hematomas.   CARDIOVASCULAR: Normal S1, S2; regular rate and rhythm.  RESPIRATORY: Normal respiratory effort; breath sounds CTAB, no wheezes, rhonchi, or rales. Speaking in full sentences. No accessory muscle use.   ABDOMEN: Soft; non-tender, non-distended.  EXTREMITIES: Full passive and active ROM in all extremities; hips and LE non-tender to palpation; distal pulses palpable and symmetric, no edema, no crepitus or step off  SKIN: Warm, dry; good skin turgor, no apparent lesions or rashes, no ecchymosis, brisk capillary refill.  NEURO: A&O x3. Sensory and motor functions are grossly intact. Speech is normal. Appearance and judgement seem appropriate for gender and age. No neurological deficits. Neurovascular sensation intact motor function 5/5 of upper and lower extremities, CN II-XII grossly intact, no ataxia, absent pronator drift, intact cerebellar function. Speech clear, without articulation or word-finding difficulties. Eyes- PERRL bilaterally. EOMs in tact. No nystagmus. No facial droop.
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
## 2023-01-14 NOTE — H&P ADULT - PROBLEM SELECTOR PLAN 5
Pt had unwitnessed fall on day prior to admission  - f/u x ray  - tylenol for pain Pt had unwitnessed fall on day prior to admission  - f/u x ray  - tylenol for pain  - CT showing acetabular fracture  - ortho consulted, f/u recs

## 2023-01-14 NOTE — ED PROVIDER NOTE - NS ED ATTENDING STATEMENT MOD
This was a shared visit with the HARSHAL. I reviewed and verified the documentation and independently performed the documented:

## 2023-01-14 NOTE — H&P ADULT - ATTENDING COMMENTS
Pt is an 86yo female with pmhx htn who presents 1 day after an unwitnessed fall. She has had episodes of confusion and is being admitted for a UTI.     IV abx, f/u culture data. LEIGH likely from poor PO intake. IVF. tachycardia likely 2/2 infection vs. pain. monitor on remote telemetry. increase pain regimen as needed. ortho consult. PT consult.

## 2023-01-14 NOTE — H&P ADULT - PROBLEM SELECTOR PLAN 7
heparin for dvt prophylaxis  on admission  - likely 2/2 to fall  - f/u repeat CK in AM  on admission  - likely 2/2 to fall  - IVF  - f/u repeat CK in AM

## 2023-01-14 NOTE — H&P ADULT - PROBLEM SELECTOR PLAN 3
130 Na, 3.2K  - replete electrolytes as needed  - continue IV hydration 130 Na, 3.2K  - replete electrolytes as needed  - continue IV hydration  - f/u AM Labs

## 2023-01-14 NOTE — H&P ADULT - ASSESSMENT
Pt is an 86yo female with pmhx htn who presents 1 day after an unwitnessed fall. She has had episodes of confusion and is being admitted for a UTI.

## 2023-01-14 NOTE — H&P ADULT - NSHPPHYSICALEXAM_GEN_ALL_CORE
T(C): 36.9 (01-14-23 @ 17:39), Max: 36.9 (01-14-23 @ 17:39)  HR: 102 (01-14-23 @ 17:39) (102 - 102)  BP: 102/70 (01-14-23 @ 17:39) (102/70 - 102/70)  RR: 18 (01-14-23 @ 17:39) (18 - 18)  SpO2: 97% (01-14-23 @ 17:39) (97% - 97%)  Wt(kg): --    Physical Exam:   GENERAL: well-groomed, well-developed, NAD  HEENT: head NC/AT; EOM intact, PERRLA, conjunctiva & sclera clear; hearing grossly intact, moist mucous membranes  NECK: supple, no JVD  RESPIRATORY: CTA B/L, no wheezing, rales, rhonchi or rubs  CARDIOVASCULAR: S1&S2, RRR, no murmurs or gallops  ABDOMEN: soft, non-tender, non-distended, + Bowel sounds x4 quadrants, no guarding, rebound or rigidity  MUSCULOSKELETAL:  no clubbing, cyanosis or edema of all 4 extremities  LYMPH: no cervical lymphadenopathy  VASCULAR: dorsalis pedis pulses 2+ bilaterally  SKIN: warm and dry, color normal  NEUROLOGIC: AA&O X3, CN2-12 intact w/ no focal deficits, no sensory loss, motor Strength 5/5 in UE & LE B/L  Psych: Normal mood and affect, normal behavior

## 2023-01-14 NOTE — ED PROVIDER NOTE - OBJECTIVE STATEMENT
87-year-old female with past medical history of hypertension presents today due to a fall last night.  Patient son at bedside providing history noting that he believes patient was on the ground for 20 minutes.  Reports patient had the ambulance come to her house last night but was doing well and declined.  Patient woke up today in which she had no strength and was needing assistance in order to stand.  Patient normally walks with a cane.  Patient son notes patient has been complaining of left hip pain.  Patient Son reports I did have recently developed some dementia.  Patient denies headache, vomiting, LOC, chest pain, abdominal pain, numbness, weakness, blood thinner use, or any other complaints.

## 2023-01-14 NOTE — ED ADULT NURSE NOTE - NSIMPLEMENTINTERV_GEN_ALL_ED
Implemented All Fall with Harm Risk Interventions:  [place] to call system. Call bell, personal items and telephone within reach. Instruct patient to call for assistance. Room bathroom lighting operational. Non-slip footwear when patient is off stretcher. Physically safe environment: no spills, clutter or unnecessary equipment. Stretcher in lowest position, wheels locked, appropriate side rails in place. Provide visual cue, wrist band, yellow gown, etc. Monitor gait and stability. Monitor for mental status changes and reorient to person, place, and time. Review medications for side effects contributing to fall risk. Reinforce activity limits and safety measures with patient and family. Provide visual clues: red socks.
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
## 2023-01-15 DIAGNOSIS — M62.82 RHABDOMYOLYSIS: ICD-10-CM

## 2023-01-15 DIAGNOSIS — M25.559 PAIN IN UNSPECIFIED HIP: ICD-10-CM

## 2023-01-15 DIAGNOSIS — D64.9 ANEMIA, UNSPECIFIED: ICD-10-CM

## 2023-01-15 DIAGNOSIS — I10 ESSENTIAL (PRIMARY) HYPERTENSION: ICD-10-CM

## 2023-01-15 LAB
ALBUMIN SERPL ELPH-MCNC: 2.3 G/DL — LOW (ref 3.3–5)
ALP SERPL-CCNC: 67 U/L — SIGNIFICANT CHANGE UP (ref 40–120)
ALT FLD-CCNC: 26 U/L — SIGNIFICANT CHANGE UP (ref 12–78)
ANION GAP SERPL CALC-SCNC: 7 MMOL/L — SIGNIFICANT CHANGE UP (ref 5–17)
AST SERPL-CCNC: 43 U/L — HIGH (ref 15–37)
BILIRUB SERPL-MCNC: 0.8 MG/DL — SIGNIFICANT CHANGE UP (ref 0.2–1.2)
BUN SERPL-MCNC: 38 MG/DL — HIGH (ref 7–23)
CALCIUM SERPL-MCNC: 7.9 MG/DL — LOW (ref 8.5–10.1)
CHLORIDE SERPL-SCNC: 99 MMOL/L — SIGNIFICANT CHANGE UP (ref 96–108)
CK SERPL-CCNC: 524 U/L — HIGH (ref 26–192)
CO2 SERPL-SCNC: 27 MMOL/L — SIGNIFICANT CHANGE UP (ref 22–31)
CREAT SERPL-MCNC: 1.5 MG/DL — HIGH (ref 0.5–1.3)
E COLI DNA BLD POS QL NAA+NON-PROBE: SIGNIFICANT CHANGE UP
EGFR: 34 ML/MIN/1.73M2 — LOW
GLUCOSE SERPL-MCNC: 104 MG/DL — HIGH (ref 70–99)
GRAM STN FLD: SIGNIFICANT CHANGE UP
HCT VFR BLD CALC: 30.7 % — LOW (ref 34.5–45)
HGB BLD-MCNC: 10.1 G/DL — LOW (ref 11.5–15.5)
LACTATE SERPL-SCNC: 1.1 MMOL/L — SIGNIFICANT CHANGE UP (ref 0.7–2)
MAGNESIUM SERPL-MCNC: 2.1 MG/DL — SIGNIFICANT CHANGE UP (ref 1.6–2.6)
MCHC RBC-ENTMCNC: 30.2 PG — SIGNIFICANT CHANGE UP (ref 27–34)
MCHC RBC-ENTMCNC: 32.9 GM/DL — SIGNIFICANT CHANGE UP (ref 32–36)
MCV RBC AUTO: 91.9 FL — SIGNIFICANT CHANGE UP (ref 80–100)
METHOD TYPE: SIGNIFICANT CHANGE UP
NRBC # BLD: 0 /100 WBCS — SIGNIFICANT CHANGE UP (ref 0–0)
PHOSPHATE SERPL-MCNC: 2.6 MG/DL — SIGNIFICANT CHANGE UP (ref 2.5–4.5)
PLATELET # BLD AUTO: 247 K/UL — SIGNIFICANT CHANGE UP (ref 150–400)
POTASSIUM SERPL-MCNC: 3 MMOL/L — LOW (ref 3.5–5.3)
POTASSIUM SERPL-SCNC: 3 MMOL/L — LOW (ref 3.5–5.3)
PROT SERPL-MCNC: 6.2 G/DL — SIGNIFICANT CHANGE UP (ref 6–8.3)
RBC # BLD: 3.34 M/UL — LOW (ref 3.8–5.2)
RBC # FLD: 13.8 % — SIGNIFICANT CHANGE UP (ref 10.3–14.5)
SODIUM SERPL-SCNC: 133 MMOL/L — LOW (ref 135–145)
WBC # BLD: 26.99 K/UL — HIGH (ref 3.8–10.5)
WBC # FLD AUTO: 26.99 K/UL — HIGH (ref 3.8–10.5)

## 2023-01-15 PROCEDURE — 99222 1ST HOSP IP/OBS MODERATE 55: CPT

## 2023-01-15 PROCEDURE — 99233 SBSQ HOSP IP/OBS HIGH 50: CPT | Mod: GC

## 2023-01-15 PROCEDURE — 93010 ELECTROCARDIOGRAM REPORT: CPT

## 2023-01-15 PROCEDURE — 99221 1ST HOSP IP/OBS SF/LOW 40: CPT

## 2023-01-15 RX ORDER — SODIUM CHLORIDE 9 MG/ML
1000 INJECTION INTRAMUSCULAR; INTRAVENOUS; SUBCUTANEOUS
Refills: 0 | Status: DISCONTINUED | OUTPATIENT
Start: 2023-01-15 | End: 2023-01-16

## 2023-01-15 RX ORDER — SODIUM CHLORIDE 9 MG/ML
1000 INJECTION INTRAMUSCULAR; INTRAVENOUS; SUBCUTANEOUS
Refills: 0 | Status: DISCONTINUED | OUTPATIENT
Start: 2023-01-15 | End: 2023-01-15

## 2023-01-15 RX ORDER — METOPROLOL TARTRATE 50 MG
12.5 TABLET ORAL
Refills: 0 | Status: DISCONTINUED | OUTPATIENT
Start: 2023-01-15 | End: 2023-01-20

## 2023-01-15 RX ORDER — METOPROLOL TARTRATE 50 MG
2.5 TABLET ORAL ONCE
Refills: 0 | Status: DISCONTINUED | OUTPATIENT
Start: 2023-01-15 | End: 2023-01-15

## 2023-01-15 RX ORDER — METOPROLOL TARTRATE 50 MG
12.5 TABLET ORAL
Refills: 0 | Status: DISCONTINUED | OUTPATIENT
Start: 2023-01-15 | End: 2023-01-15

## 2023-01-15 RX ORDER — METOPROLOL TARTRATE 50 MG
2.5 TABLET ORAL ONCE
Refills: 0 | Status: COMPLETED | OUTPATIENT
Start: 2023-01-15 | End: 2023-01-15

## 2023-01-15 RX ORDER — POTASSIUM CHLORIDE 20 MEQ
40 PACKET (EA) ORAL ONCE
Refills: 0 | Status: COMPLETED | OUTPATIENT
Start: 2023-01-15 | End: 2023-01-15

## 2023-01-15 RX ORDER — ACETAMINOPHEN 500 MG
1000 TABLET ORAL ONCE
Refills: 0 | Status: COMPLETED | OUTPATIENT
Start: 2023-01-15 | End: 2023-01-15

## 2023-01-15 RX ORDER — PIPERACILLIN AND TAZOBACTAM 4; .5 G/20ML; G/20ML
3.38 INJECTION, POWDER, LYOPHILIZED, FOR SOLUTION INTRAVENOUS EVERY 8 HOURS
Refills: 0 | Status: DISCONTINUED | OUTPATIENT
Start: 2023-01-15 | End: 2023-01-17

## 2023-01-15 RX ORDER — HEPARIN SODIUM 5000 [USP'U]/ML
5000 INJECTION INTRAVENOUS; SUBCUTANEOUS EVERY 12 HOURS
Refills: 0 | Status: DISCONTINUED | OUTPATIENT
Start: 2023-01-15 | End: 2023-01-20

## 2023-01-15 RX ORDER — POTASSIUM CHLORIDE 20 MEQ
40 PACKET (EA) ORAL EVERY 4 HOURS
Refills: 0 | Status: COMPLETED | OUTPATIENT
Start: 2023-01-15 | End: 2023-01-15

## 2023-01-15 RX ADMIN — PIPERACILLIN AND TAZOBACTAM 25 GRAM(S): 4; .5 INJECTION, POWDER, LYOPHILIZED, FOR SOLUTION INTRAVENOUS at 06:52

## 2023-01-15 RX ADMIN — Medication 40 MILLIEQUIVALENT(S): at 15:47

## 2023-01-15 RX ADMIN — Medication 12.5 MILLIGRAM(S): at 17:07

## 2023-01-15 RX ADMIN — SODIUM CHLORIDE 500 MILLILITER(S): 9 INJECTION INTRAMUSCULAR; INTRAVENOUS; SUBCUTANEOUS at 00:59

## 2023-01-15 RX ADMIN — HEPARIN SODIUM 5000 UNIT(S): 5000 INJECTION INTRAVENOUS; SUBCUTANEOUS at 06:52

## 2023-01-15 RX ADMIN — Medication 40 MILLIEQUIVALENT(S): at 10:20

## 2023-01-15 RX ADMIN — PIPERACILLIN AND TAZOBACTAM 25 GRAM(S): 4; .5 INJECTION, POWDER, LYOPHILIZED, FOR SOLUTION INTRAVENOUS at 15:46

## 2023-01-15 RX ADMIN — Medication 40 MILLIEQUIVALENT(S): at 03:38

## 2023-01-15 RX ADMIN — Medication 2.5 MILLIGRAM(S): at 09:21

## 2023-01-15 RX ADMIN — SODIUM CHLORIDE 100 MILLILITER(S): 9 INJECTION INTRAMUSCULAR; INTRAVENOUS; SUBCUTANEOUS at 15:46

## 2023-01-15 RX ADMIN — Medication 1000 MILLIGRAM(S): at 07:22

## 2023-01-15 RX ADMIN — HEPARIN SODIUM 5000 UNIT(S): 5000 INJECTION INTRAVENOUS; SUBCUTANEOUS at 17:08

## 2023-01-15 RX ADMIN — Medication 400 MILLIGRAM(S): at 06:52

## 2023-01-15 RX ADMIN — SODIUM CHLORIDE 100 MILLILITER(S): 9 INJECTION INTRAMUSCULAR; INTRAVENOUS; SUBCUTANEOUS at 02:49

## 2023-01-15 RX ADMIN — PIPERACILLIN AND TAZOBACTAM 25 GRAM(S): 4; .5 INJECTION, POWDER, LYOPHILIZED, FOR SOLUTION INTRAVENOUS at 21:33

## 2023-01-15 NOTE — ED ADULT NURSE REASSESSMENT NOTE - NS ED NURSE REASSESS COMMENT FT1
pt reavaluated and medicated with tylenol as ordered and admitted to remote tele confortable at present and report given to holding ER nurse
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
## 2023-01-15 NOTE — PROGRESS NOTE ADULT - PROBLEM SELECTOR PLAN 3
130 Na, 3.2K  - replete electrolytes as needed  - continue IV hydration  - f/u AM Labs #Hyponatremia; improving  130 -> 133  Continue gentle fluids      #Hypokalemia  3.0  KCl 40 x 2 PO

## 2023-01-15 NOTE — CONSULT NOTE ADULT - ASSESSMENT
Pt is an 88yo female with pmhx htn who presents 1 day after an unwitnessed fall, pt remembers fall and denies LOC.   Cardiology called for tachycardia.  Pt currently c/o back pain, otherwise feels well.  She was found to have UTI    tachycardia:  ECG shows NSR with PACs.    Bedside tele with NSR and brief runs of SVT up to 130's in the setting of UTI/rhabdo  Pt was given metoprolol 2.5mg IV x2 overnight  Can start metoprolol tartrate 12.5mg po bid - monitor BP closely as BP is soft  admit to tele  check TTE  Currently there are no s/s acute ischemia or HF  She is receiving IVF - monitor closely    HTN:  BP soft  hold amlodipine/valsartan  start metoprolol tartrate 12.5mg bid and monitor closely    UTI/LEIGH:  antibiotics as per primary team  avoid nephrotoxins    - Monitor and replete lytes, keep K>4 and Mg >2  - Further cardiac workup will depend on clinical course.   - All other workup per primary team  - Thank you for this consult!

## 2023-01-15 NOTE — PROGRESS NOTE ADULT - PROBLEM SELECTOR PLAN 4
EKG: sinus tachycardia with premature atrial complexes, left axis deviation  - likely 2/2 to UTI  -monitor on remote telemetry Admission EKG: sinus tachycardia with premature atrial complexes, left axis deviation  1/14 - 1/15 overnight rates 90s to 150s, VSS  Likely 2/2 to UTI  Cardio (Dr. Soto) following    - Can start metoprolol tartrate 12.5mg po bid - monitor BPs closely    - TTE - f/u

## 2023-01-15 NOTE — CONSULT NOTE ADULT - SUBJECTIVE AND OBJECTIVE BOX
87y Female community ambulator with a cane at baseline presents with left hip pain s/p mechanical fall after she slid off toilet yesterday and was unable to get up afterwards. Denies head strike/LOC/other orthopedic injuries at this time. Denies numbness/tingling in the affected extremity.      PAST MEDICAL & SURGICAL HISTORY:  Hypertension        Home Medications:  amlodipine-valsartan 5 mg-160 mg oral tablet: 0.5 tab(s) orally once a day (2023 23:03)    Allergies    No Known Allergies    Intolerances                              10.1   26.99 )-----------( 247      ( 15 Ron 2023 08:34 )             30.7     01-15    133<L>  |  99  |  38<H>  ----------------------------<  104<H>  3.0<L>   |  27  |  1.50<H>    Ca    7.9<L>      15 Ron 2023 08:34  Phos  2.6     -15  Mg     2.1     15    TPro  6.2  /  Alb  2.3<L>  /  TBili  0.8  /  DBili  x   /  AST  43<H>  /  ALT  26  /  AlkPhos  67  01-15    PT/INR - ( 2023 19:00 )   PT: 14.6 sec;   INR: 1.25 ratio         PTT - ( 2023 19:00 )  PTT:30.1 sec  Urinalysis Basic - ( 2023 20:55 )    Color: Yellow / Appearance: Slightly Turbid / S.015 / pH: x  Gluc: x / Ketone: Trace  / Bili: Negative / Urobili: Negative   Blood: x / Protein: 100 / Nitrite: Negative   Leuk Esterase: Moderate / RBC: 3-5 /HPF / WBC 26-50   Sq Epi: x / Non Sq Epi: Occasional / Bacteria: Many          Vital Signs Last 24 Hrs  T(C): 37.1 (15 Ron 2023 03:06), Max: 37.1 (15 Ron 2023 03:06)  T(F): 98.7 (15 Ron 2023 03:06), Max: 98.7 (15 Ron 2023 03:06)  HR: 88 (15 Ron 2023 09:45) (88 - 123)  BP: 100/69 (15 Ron 2023 09:45) (99/62 - 106/67)  BP(mean): --  RR: 17 (15 Ron 2023 06:20) (16 - 18)  SpO2: 98% (15 Ron 2023 06:20) (94% - 98%)    Parameters below as of 15 Ron 2023 06:20  Patient On (Oxygen Delivery Method): room air        PHYSICAL EXAM  General: NAD, Awake and Alert  LLE  skin intact  TTP greater troch  SILT L2-S1  Able to SLR  no pain with axial load or log roll  +EHL/FHL/TA/GSC  palpable DP  compartments soft and compressible  calves NTTP    Secondary Assessment:  NC/AT, NTTP of clavicles, NTTP of C-spine,T-spine, or L-spine in the midline and paraspinal areas; NTTP of pelvis  LUE: NTTP of Shoulder, Elbow, Wrist, Hand; NT with AROM/PROM of Shoulder, Elbow, Wrist, Hand; AIN/PIN/Med/Uln/Msc/Rad/Ax intact  RUE: NTTP of Shoulder, Elbow, Wrist, Hand; NT with AROM/PROM of Shoulder, Elbow, Wrist, Hand; AIN/PIN/Med/Uln/Msc/Rad/Ax intact   RLE: Able to SLR, NT with Log Roll, NT with Heel Strike, NTTP of Hip, Knee, Ankle, Foot; NT with AROM of Hip, Knee, Ankle, Foot; Q/H/GSC/TA/EHL/FHL intact        IMAGING:  XR L hip: no acute fractures of dislocation; pending official read   CT pelvis: age indeterminate fracture through superior osteophyte of acetabulum    Assessment/Plan:  87y Female with age indeterminate fracture through superior osteophyte of acetabulum s/p mechanical fall yesterday    -Pain control as needed  -DVT ppx per primary team  -TTWB LLE  -PT/OT  -Will discuss with attending, and advise if plan changes    ******INCOMPLETE NOTE IN PROGRESS******  ******INCOMPLETE NOTE IN PROGRESS******  ******INCOMPLETE NOTE IN PROGRESS******   87y Female community ambulator with a cane at baseline presents with left hip pain s/p mechanical fall after she slid off toilet yesterday and was unable to get up afterwards. Denies head strike/LOC/other orthopedic injuries at this time. Denies numbness/tingling in the affected extremity.      PAST MEDICAL & SURGICAL HISTORY:  Hypertension        Home Medications:  amlodipine-valsartan 5 mg-160 mg oral tablet: 0.5 tab(s) orally once a day (2023 23:03)    Allergies    No Known Allergies    Intolerances                              10.1   26.99 )-----------( 247      ( 15 Ron 2023 08:34 )             30.7     01-15    133<L>  |  99  |  38<H>  ----------------------------<  104<H>  3.0<L>   |  27  |  1.50<H>    Ca    7.9<L>      15 Ron 2023 08:34  Phos  2.6     -15  Mg     2.1     15    TPro  6.2  /  Alb  2.3<L>  /  TBili  0.8  /  DBili  x   /  AST  43<H>  /  ALT  26  /  AlkPhos  67  01-15    PT/INR - ( 2023 19:00 )   PT: 14.6 sec;   INR: 1.25 ratio         PTT - ( 2023 19:00 )  PTT:30.1 sec  Urinalysis Basic - ( 2023 20:55 )    Color: Yellow / Appearance: Slightly Turbid / S.015 / pH: x  Gluc: x / Ketone: Trace  / Bili: Negative / Urobili: Negative   Blood: x / Protein: 100 / Nitrite: Negative   Leuk Esterase: Moderate / RBC: 3-5 /HPF / WBC 26-50   Sq Epi: x / Non Sq Epi: Occasional / Bacteria: Many          Vital Signs Last 24 Hrs  T(C): 37.1 (15 Ron 2023 03:06), Max: 37.1 (15 Ron 2023 03:06)  T(F): 98.7 (15 Ron 2023 03:06), Max: 98.7 (15 Ron 2023 03:06)  HR: 88 (15 Ron 2023 09:45) (88 - 123)  BP: 100/69 (15 Ron 2023 09:45) (99/62 - 106/67)  BP(mean): --  RR: 17 (15 Ron 2023 06:20) (16 - 18)  SpO2: 98% (15 Ron 2023 06:20) (94% - 98%)    Parameters below as of 15 Ron 2023 06:20  Patient On (Oxygen Delivery Method): room air        PHYSICAL EXAM  General: NAD, Awake and Alert  LLE  skin intact  TTP greater troch  SILT L2-S1  Able to SLR  no pain with axial load or log roll  +EHL/FHL/TA/GSC  palpable DP  compartments soft and compressible  calves NTTP    Secondary Assessment:  NC/AT, NTTP of clavicles, NTTP of C-spine,T-spine, or L-spine in the midline and paraspinal areas; NTTP of pelvis  LUE: NTTP of Shoulder, Elbow, Wrist, Hand; NT with AROM/PROM of Shoulder, Elbow, Wrist, Hand; AIN/PIN/Med/Uln/Msc/Rad/Ax intact  RUE: NTTP of Shoulder, Elbow, Wrist, Hand; NT with AROM/PROM of Shoulder, Elbow, Wrist, Hand; AIN/PIN/Med/Uln/Msc/Rad/Ax intact   RLE: Able to SLR, NT with Log Roll, NT with Heel Strike, NTTP of Hip, Knee, Ankle, Foot; NT with AROM of Hip, Knee, Ankle, Foot; Q/H/GSC/TA/EHL/FHL intact        IMAGING:  XR L hip: no acute fractures of dislocation; pending official read   CT pelvis: age indeterminate fracture through superior osteophyte of acetabulum    Assessment/Plan:  87y Female with age indeterminate fracture through superior osteophyte of acetabulum s/p mechanical fall yesterday    -Imaging reviewed with Dr. Carlos  -No acute orthopedic surgical intervention at this time  -FU XR pelvis, Judet views, inlet/outlet  -Pain control as needed  -DVT ppx per primary team  -WBAT LLE  -PT/OT  -will consider MRI L hip if patient not able to ambulate due to pain  -Discussed with Dr. Carlos who agrees with plan

## 2023-01-15 NOTE — PROGRESS NOTE ADULT - PROBLEM SELECTOR PLAN 5
Pt had unwitnessed fall on day prior to admission  - f/u x ray  - tylenol for pain  - CT showing acetabular fracture  - ortho consulted, f/u recs Pt had unwitnessed fall on day prior to admission  Tylenol for pain  CT showing acetabular fracture  Ortho following, recs appreciated  - No acute orthopedic surgical intervention at this time  - XR pelvis, Judet views, inlet/outlet  - To consider MRI L hip if patient not able to ambulate due to pain

## 2023-01-15 NOTE — PROGRESS NOTE ADULT - PROBLEM SELECTOR PLAN 6
102/70 BP in ED  - hold home almodipine valsartan due to LEIGH and soft BP Hgb 12 -> 10.1  Likely dilutional  Monitor  CBC

## 2023-01-15 NOTE — PROVIDER CONTACT NOTE (OTHER) - SITUATION
Pt with episodes of non-sustaining ST with PAC's and PVC's Pt with episodes of non-sustaining ST with PAC's and PVC's. Pt is asymptomatic.

## 2023-01-15 NOTE — PROGRESS NOTE ADULT - SUBJECTIVE AND OBJECTIVE BOX
Patient is a 87y old  Female who presents with a chief complaint of UTI (15 Ron 2023 09:31)      SUBJECTIVE / OVERNIGHT EVENTS:    Tele reviewed:       ADDITIONAL REVIEW OF SYSTEMS: Negative except for above    MEDICATIONS  (STANDING):  heparin   Injectable 5000 Unit(s) SubCutaneous every 12 hours  metoprolol tartrate 12.5 milliGRAM(s) Oral two times a day  piperacillin/tazobactam IVPB.. 3.375 Gram(s) IV Intermittent every 8 hours  potassium chloride    Tablet ER 40 milliEquivalent(s) Oral every 4 hours  sodium chloride 0.9%. 1000 milliLiter(s) (100 mL/Hr) IV Continuous <Continuous>    MEDICATIONS  (PRN):  acetaminophen     Tablet .. 650 milliGRAM(s) Oral every 6 hours PRN Temp greater or equal to 38C (100.4F), Mild Pain (1 - 3)  aluminum hydroxide/magnesium hydroxide/simethicone Suspension 30 milliLiter(s) Oral every 4 hours PRN Dyspepsia  melatonin 3 milliGRAM(s) Oral at bedtime PRN Insomnia  ondansetron Injectable 4 milliGRAM(s) IV Push every 8 hours PRN Nausea and/or Vomiting      CAPILLARY BLOOD GLUCOSE        I&O's Summary      PHYSICAL EXAM:  Vital Signs Last 24 Hrs  T(C): 37.1 (15 Ron 2023 03:06), Max: 37.1 (15 Ron 2023 03:06)  T(F): 98.7 (15 Ron 2023 03:06), Max: 98.7 (15 Ron 2023 03:06)  HR: 88 (15 Ron 2023 09:45) (88 - 123)  BP: 100/69 (15 Ron 2023 09:45) (99/62 - 106/67)  BP(mean): --  RR: 17 (15 Ron 2023 06:20) (16 - 18)  SpO2: 98% (15 Ron 2023 06:20) (94% - 98%)    Parameters below as of 15 Ron 2023 06:20  Patient On (Oxygen Delivery Method): room air        PHYSICAL EXAM:  GENERAL: NAD, well-developed  HEAD:  Atraumatic, Normocephalic  EYES:  conjunctiva and sclera clear  NECK: Supple, No JVD  CHEST/LUNG: Clear to auscultation bilaterally; No wheeze  HEART: Regular rate and rhythm; No murmurs, rubs, or gallops  ABDOMEN: Soft, Nontender, Nondistended; Bowel sounds present  EXTREMITIES:  2+ Peripheral Pulses, No clubbing, cyanosis, or edema  PSYCH: AAOx3  NEUROLOGY: non-focal  SKIN: No rashes or lesions      LABS:                        10.1   26.99 )-----------( 247      ( 15 Ron 2023 08:34 )             30.7     01-15    133<L>  |  99  |  38<H>  ----------------------------<  104<H>  3.0<L>   |  27  |  1.50<H>    Ca    7.9<L>      15 Ron 2023 08:34  Phos  2.6     -15  Mg     2.1     -15    TPro  6.2  /  Alb  2.3<L>  /  TBili  0.8  /  DBili  x   /  AST  43<H>  /  ALT  26  /  AlkPhos  67  01-15    PT/INR - ( 2023 19:00 )   PT: 14.6 sec;   INR: 1.25 ratio         PTT - ( 2023 19:00 )  PTT:30.1 sec  CARDIAC MARKERS ( 2023 19:00 )  x     / x     / 888 U/L / x     / x          Urinalysis Basic - ( 2023 20:55 )    Color: Yellow / Appearance: Slightly Turbid / S.015 / pH: x  Gluc: x / Ketone: Trace  / Bili: Negative / Urobili: Negative   Blood: x / Protein: 100 / Nitrite: Negative   Leuk Esterase: Moderate / RBC: 3-5 /HPF / WBC 26-50   Sq Epi: x / Non Sq Epi: Occasional / Bacteria: Many          RADIOLOGY & ADDITIONAL TESTS:    Imaging Personally Reviewed:    Electrocardiogram Personally Reviewed:    COORDINATION OF CARE:  Care Discussed with Consultants/Other Providers [Y/N]:  Prior or Outpatient Records Reviewed [Y/N]:   Patient is a 87y old  Female who presents with a chief complaint of UTI (15 Ron 2023 10:27)      Subjective:  INTERVAL HPI/OVERNIGHT EVENTS: Patient seen and examined at bedside. Patient c/o L buttock and L heel pain. Reports it as 10/10 though on palpation patient does not retract or demonstrate signs of severe pain. Pain seems to be managed well with Tylenol. Noted to be tachy on the monitor, EKGs on admission sinus tach with prematura atrial complexes. To repeat EKG. Denies fevers, chills, headache, lightheadedness, chest pain, dyspnea, abdominal pain, n/v/d/c.    MEDICATIONS  (STANDING):  heparin   Injectable 5000 Unit(s) SubCutaneous every 12 hours  metoprolol tartrate 12.5 milliGRAM(s) Oral two times a day  piperacillin/tazobactam IVPB.. 3.375 Gram(s) IV Intermittent every 8 hours  potassium chloride    Tablet ER 40 milliEquivalent(s) Oral every 4 hours  sodium chloride 0.9%. 1000 milliLiter(s) (100 mL/Hr) IV Continuous <Continuous>    MEDICATIONS  (PRN):  acetaminophen     Tablet .. 650 milliGRAM(s) Oral every 6 hours PRN Temp greater or equal to 38C (100.4F), Mild Pain (1 - 3)  aluminum hydroxide/magnesium hydroxide/simethicone Suspension 30 milliLiter(s) Oral every 4 hours PRN Dyspepsia  melatonin 3 milliGRAM(s) Oral at bedtime PRN Insomnia  ondansetron Injectable 4 milliGRAM(s) IV Push every 8 hours PRN Nausea and/or Vomiting      Allergies    No Known Allergies    Intolerances        REVIEW OF SYSTEMS:  CONSTITUTIONAL: No fever or chills  HEENT:  No headache, no sore throat  RESPIRATORY: No cough, wheezing, or shortness of breath  CARDIOVASCULAR: No chest pain, palpitations  GASTROINTESTINAL: No abd pain, nausea, vomiting, or diarrhea  MUSCULOSKELETAL: + L heel and L buttock pain.    Objective:  Vital Signs Last 24 Hrs  T(C): 37.1 (15 Ron 2023 03:06), Max: 37.1 (15 Ron 2023 03:06)  T(F): 98.7 (15 Ron 2023 03:06), Max: 98.7 (15 Ron 2023 03:06)  HR: 88 (15 Ron 2023 09:45) (88 - 123)  BP: 100/69 (15 Ron 2023 09:45) (99/62 - 106/67)  BP(mean): --  RR: 17 (15 Ron 2023 06:20) (16 - 18)  SpO2: 98% (15 Ron 2023 06:20) (94% - 98%)    Parameters below as of 15 Ron 2023 06:20  Patient On (Oxygen Delivery Method): room air        GENERAL: NAD, lying in bed comfortably  HEAD:  Atraumatic, Normocephalic  EYES: EOMI, conjunctiva and sclera clear  CHEST/LUNG: CTA b/l, No rales, rhonchi, wheezing, or rubs. Unlabored respirations  HEART: Tachycardic; No murmurs, rubs, or gallops  ABDOMEN: Soft, Nontender, Nondistended  EXTREMITIES:  No TTP of L heel, L buttock mild TTP.  NERVOUS SYSTEM:  Speech clear      LABS:                        10.1   26.99 )-----------( 247      ( 15 Ron 2023 08:34 )             30.7     CBC Full  -  ( 15 Ron 2023 08:34 )  WBC Count : 26.99 K/uL  Hemoglobin : 10.1 g/dL  Hematocrit : 30.7 %  Platelet Count - Automated : 247 K/uL  Mean Cell Volume : 91.9 fl  Mean Cell Hemoglobin : 30.2 pg  Mean Cell Hemoglobin Concentration : 32.9 gm/dL  Auto Neutrophil # : x  Auto Lymphocyte # : x  Auto Monocyte # : x  Auto Eosinophil # : x  Auto Basophil # : x  Auto Neutrophil % : x  Auto Lymphocyte % : x  Auto Monocyte % : x  Auto Eosinophil % : x  Auto Basophil % : x    15 Rno 2023 08:34    133    |  99     |  38     ----------------------------<  104    3.0     |  27     |  1.50     Ca    7.9        15 Ron 2023 08:34  Phos  2.6       15 Ron 2023 08:34  Mg     2.1       15 Ron 2023 08:34    TPro  6.2    /  Alb  2.3    /  TBili  0.8    /  DBili  x      /  AST  43     /  ALT  26     /  AlkPhos  67     15 Ron 2023 08:34    PT/INR - ( 2023 19:00 )   PT: 14.6 sec;   INR: 1.25 ratio         PTT - ( 2023 19:00 )  PTT:30.1 sec  Urinalysis Basic - ( 2023 20:55 )    Color: Yellow / Appearance: Slightly Turbid / S.015 / pH: x  Gluc: x / Ketone: Trace  / Bili: Negative / Urobili: Negative   Blood: x / Protein: 100 / Nitrite: Negative   Leuk Esterase: Moderate / RBC: 3-5 /HPF / WBC 26-50   Sq Epi: x / Non Sq Epi: Occasional / Bacteria: Many      CAPILLARY BLOOD GLUCOSE              RADIOLOGY & ADDITIONAL TESTS:    Personally reviewed.     Consultant(s) Notes Reviewed:  [x] YES  [ ] NO

## 2023-01-15 NOTE — PATIENT PROFILE ADULT - FALL HARM RISK - HARM RISK INTERVENTIONS

## 2023-01-15 NOTE — CONSULT NOTE ADULT - SUBJECTIVE AND OBJECTIVE BOX
[facility] - Jeremi Perkins, Cedrick Samson Savella, [place]  Office Number: [phone]    Initial Consult Note    CHIEF COMPLAINT: Patient is a 87y old  Female who presents with a chief complaint of UTI (14 Jan 2023 22:43)      HPI:  Pt is an 88yo female with pmhx htn who presents 1 day after an unwitnessed fall. At 730PM on day prior to admission son noticed on cameras that the pt was collapsed on the ground of the bathroom floor.  She states that she remembers sliding down wall, she denies LOC. He contacted the fire department who arrived to the scene where they all decided not to take the pt to the hospital. The pt was unable to walk or stand without assistance. The son stayed with her overnight. On the morning of admission the pt woke up and still was unable to walk or stand on her own. She was also complaining of right sided hip pain. She usually walks fine on her own and only occasionally used a cane outside her home. She was tremulous and was dizzy when she was able to stand up with assistance. At that point the family decided to take her to [facility]. They also reported episodes of confusion that would come and go recently. Her last fall was mechanical and was about 4-5 years ago which resulted in a hemorrhage. Her last UTI was several years ago. Of note pt's son believes that the pt had poor PO intake on day prior to admission.    Cardiology called for tachycardia.  Pt currently c/o back pain, otherwise feels well.  She was found to have UTI  ECG shows NSR with PACs.  Bedside tele with NSR and brief runs of SVT up to 130's.  Pt was given metoprolol 2.5mg IV x2 overnight    ED Course:    Temp 98.5, , /70, RR 18, SPO2 97% RA  s/p Iv Zosyn x1, 1L NS, 0.5L NS  EKG: sinus tachycardia with premature atrial complexes, left axis deviation, inferior infarct  Labs significant for: 25.44 WBC, 22.76 neutrophil, 2.3 lactate, 130 Na, 3.2K, 42 BUN, 1.8 Cr, AST 56, 888 Creatinine Kinase, Moderate Leukocyte esterase, 26-50 WBC   Imaging:  CT HEAD: No acute intracranial hemorrhage, mass effect, or osseous   fracture.   CT CERVICAL SPINE: No acute cervical spine fracture or traumatic   malalignment. Multilevel cervical spondylosis.   (14 Jan 2023 22:43)      PAST MEDICAL & SURGICAL HISTORY:  Hypertension          SOCIAL HISTORY:  No tobacco, ethanol, or drug abuse.    FAMILY HISTORY:  FHx: hypertension (Mother)    FHx: diabetes mellitus (Mother)      No family history of acute MI or sudden cardiac death.    MEDICATIONS  (STANDING):  heparin   Injectable 5000 Unit(s) SubCutaneous every 12 hours  piperacillin/tazobactam IVPB.. 3.375 Gram(s) IV Intermittent every 8 hours  sodium chloride 0.9%. 1000 milliLiter(s) (100 mL/Hr) IV Continuous <Continuous>    MEDICATIONS  (PRN):  acetaminophen     Tablet .. 650 milliGRAM(s) Oral every 6 hours PRN Temp greater or equal to 38C (100.4F), Mild Pain (1 - 3)  aluminum hydroxide/magnesium hydroxide/simethicone Suspension 30 milliLiter(s) Oral every 4 hours PRN Dyspepsia  melatonin 3 milliGRAM(s) Oral at bedtime PRN Insomnia  ondansetron Injectable 4 milliGRAM(s) IV Push every 8 hours PRN Nausea and/or Vomiting      Allergies    No Known Allergies    Intolerances        REVIEW OF SYSTEMS:    CONSTITUTIONAL: No weakness, fevers or chills  EYES/ENT: No visual changes;  No vertigo or throat pain   NECK: No pain or stiffness  RESPIRATORY: No cough, wheezing, hemoptysis; No shortness of breath  CARDIOVASCULAR: No chest pain or palpitations  GASTROINTESTINAL: No abdominal pain. No nausea, vomiting, or hematemesis; No diarrhea or constipation. No melena or hematochezia.  GENITOURINARY: No dysuria, frequency or hematuria  NEUROLOGICAL: No numbness or weakness  SKIN: No itching or rash  All other review of systems is negative unless indicated above    VITAL SIGNS:   Vital Signs Last 24 Hrs  T(C): 37.1 (15 Ron 2023 03:06), Max: 37.1 (15 Ron 2023 03:06)  T(F): 98.7 (15 Ron 2023 03:06), Max: 98.7 (15 Ron 2023 03:06)  HR: 100 (15 Ron 2023 06:20) (95 - 123)  BP: 100/63 (15 Ron 2023 06:20) (99/62 - 106/67)  BP(mean): --  RR: 17 (15 Ron 2023 06:20) (16 - 18)  SpO2: 98% (15 Ron 2023 06:20) (94% - 98%)    Parameters below as of 15 Ron 2023 06:20  Patient On (Oxygen Delivery Method): room air        I&O's Summary      On Exam:    Constitutional: NAD, alert and oriented x 3  Lungs:  Non-labored, breath sounds are clear bilaterally, No wheezing, rales or rhonchi  Cardiovascular: RRR.  S1 and S2 positive.  No murmurs, rubs, gallops or clicks  Gastrointestinal: Bowel Sounds present, soft, nontender.   Lymph: No peripheral edema. No cervical lymphadenopathy.  Neurological: Alert, no focal deficits  Skin: No rashes or ulcers   Psych:  Mood & affect appropriate.    LABS: All Labs Reviewed:                        10.1   26.99 )-----------( 247      ( 15 Ron 2023 08:34 )             30.7                         12.0   25.44 )-----------( 311      ( 14 Jan 2023 19:00 )             36.1     14 Jan 2023 19:00    130    |  95     |  42     ----------------------------<  113    3.2     |  27     |  1.80     Ca    8.4        14 Jan 2023 19:00    TPro  7.5    /  Alb  2.9    /  TBili  1.0    /  DBili  x      /  AST  56     /  ALT  32     /  AlkPhos  82     14 Jan 2023 19:00    PT/INR - ( 14 Jan 2023 19:00 )   PT: 14.6 sec;   INR: 1.25 ratio         PTT - ( 14 Jan 2023 19:00 )  PTT:30.1 sec  CARDIAC MARKERS ( 14 Jan 2023 19:00 )  x     / x     / 888 U/L / x     / x          Blood Culture:         RADIOLOGY:    EKG:

## 2023-01-15 NOTE — PROGRESS NOTE ADULT - PROBLEM SELECTOR PLAN 7
on admission  - likely 2/2 to fall  - IVF  - f/u repeat CK in AM 102/70 BP in ED  Hold home amlodipine valsartan due to LEIGH and soft BP

## 2023-01-15 NOTE — PROGRESS NOTE ADULT - PROBLEM SELECTOR PLAN 2
BUN 42/ Cr 1.8 on admission  - baseline creatinine unknown  - likely 2/2 to dehydration  - continue IV hydration BUN 42/ Cr 1.8 on admission; now 38/1.5  - Baseline creatinine unknown  - Likely 2/2 to dehydration  - Continue IV hydration

## 2023-01-15 NOTE — CONSULT NOTE ADULT - SUBJECTIVE AND OBJECTIVE BOX
Covering [facility] DIVISION of INFECTIOUS DISEASE  LIAM Root, RUBÉN Ahmadi, ELANA Stauffer    HPI:  88yo female PMH Dementia htn found on floor at her home by son brought to the hospital  to be evaluated. In ED Ua with pyuria, WBC 25K. Afebrile. No n/v/d CP SOB. Pt is a poor historian. Son at bedside. Blood cultures with GNR- Ecoli by pcr.    Infectious Disease consult was called to evaluate pt and for antibiotic management.      Past Medical & Surgical Hx:  PAST MEDICAL & SURGICAL HISTORY:  Hypertension      Social History--  EtOH: denies   Tobacco: denies   Drug Use: denies       FAMILY HISTORY:  FHx: hypertension (Mother)    FHx: diabetes mellitus (Mother)      Allergies  No Known Allergies    Intolerances  NONE      Home/ Out patient  Medications :    Current Inpatient Medications :    ANTIBIOTICS:   piperacillin/tazobactam IVPB.. 3.375 Gram(s) IV Intermittent every 8 hours      OTHER RELEVANT MEDICATIONS :  acetaminophen     Tablet .. 650 milliGRAM(s) Oral every 6 hours PRN  aluminum hydroxide/magnesium hydroxide/simethicone Suspension 30 milliLiter(s) Oral every 4 hours PRN  heparin   Injectable 5000 Unit(s) SubCutaneous every 12 hours  melatonin 3 milliGRAM(s) Oral at bedtime PRN  metoprolol tartrate 12.5 milliGRAM(s) Oral two times a day  ondansetron Injectable 4 milliGRAM(s) IV Push every 8 hours PRN  sodium chloride 0.9%. 1000 milliLiter(s) IV Continuous <Continuous>      ROS:  Unable to obtain due to : pt's condition      Physical Exam:  Vital Signs Last 24 Hrs  T(C): 36.4 (15 Ron 2023 13:06), Max: 37.1 (15 Ron 2023 03:06)  T(F): 97.6 (15 Ron 2023 13:06), Max: 98.7 (15 Ron 2023 03:06)  HR: 125 (15 Ron 2023 17:13) (88 - 125)  BP: 147/78 (15 Ron 2023 17:13) (99/62 - 147/78)  RR: 18 (15 Ron 2023 13:06) (16 - 18)  SpO2: 99% (15 Ron 2023 13:06) (94% - 99%)    Parameters below as of 15 Ron 2023 13:06  Patient On (Oxygen Delivery Method): room air        General: weak no acute distress  Neck: supple, trachea midline  Lungs: clear, no wheeze/rhonchi  Cardiovascular: regular rate and rhythm, S1 S2  Abdomen: soft, nontender, ND, bowel sounds normal  Neurological:  awake confused  Skin: no rash  Extremities: no edema    Labs:                         10.1   26.99 )-----------( 247      ( 15 Ron 2023 08:34 )             30.7   01-15    133<L>  |  99  |  38<H>  ----------------------------<  104<H>  3.0<L>   |  27  |  1.50<H>    Ca    7.9<L>      15 Ron 2023 08:34  Phos  2.6     01-15  Mg     2.1     01-15    TPro  6.2  /  Alb  2.3<L>  /  TBili  0.8  /  DBili  x   /  AST  43<H>  /  ALT  26  /  AlkPhos  67  01-15      RECENT CULTURES:    Culture - Blood (collected 14 Jan 2023 21:30)  Source: .Blood Blood-Peripheral  Gram Stain (15 Ron 2023 11:49):    Growth in aerobic bottle: Gram Negative Rods    Growth in anaerobic bottle: Gram Negative Rods  Preliminary Report (15 Ron 2023 11:49):    Growth in aerobic bottle: Gram Negative Rods    Growth in anaerobic bottle: Gram Negative Rods    ***Blood Panel PCR results on this specimen are available    approximately 3 hours after the Gram stain result.***    Gram stain, PCR, and/or culture results may not always    correspond due to difference in methodologies.    ************************************************************    This PCR assay was performed by multiplex PCR. This    Assay tests for 66 bacterial and resistance gene targets.    Please refer to the [facility] Labs test directory    at [email or web address] for details.  Organism: Blood Culture PCR (15 Ron 2023 14:39)  Organism: Blood Culture PCR (15 Ron 2023 14:39)      -  Escherichia coli: Detec      Method Type: PCR    Culture - Blood (collected 14 Jan 2023 21:26)  Source: .Blood Blood-Peripheral  Gram Stain (15 Ron 2023 12:21):    Growth in anaerobic bottle: Gram Negative Rods    Growth in aerobic bottle: Gram Negative Rods  Preliminary Report (15 Ron 2023 12:22):    Growth in anaerobic bottle: Gram Negative Rods    Growth in aerobic bottle: Gram Negative Rods    RADIOLOGY & ADDITIONAL STUDIES:    Assessment :   88yo female PMH Dementia htn admitted with Ecoli sepis with bacteremia sec UTI  Blood cultures with GNR- Ecoli by pcr.  Marked leukocytosis  LEIGH    Plan :   Cont Zosyn  Get CT AP without contrast rule out obstructive uropathy  Fu cultures  Repeat blood cultures  Trend temps and cbc  Asp precautions      Continue with present regiment .  Approptiate use of antibiotics and adverse effects reviewed.      I have discussed the above plan of care with patient's son detail. He expressed understanding of the treatment plan . Risks, benefits and alternatives discussed in detail. I have asked if he has any questions or concerns and appropriately addressed them to the best of my ability .      > 45 minutes spent in direct patient care reviewing  the notes, lab data/ imaging , discussion with multidisciplinary team. All questions were addressed and answered to the best of my capacity .    Thank you for allowing me to participate in the care of your patient .      Daisy Carrington MD  Infectious Disease  [phone]

## 2023-01-15 NOTE — PROGRESS NOTE ADULT - PROBLEM SELECTOR PLAN 1
Pt was found to have a UTI on admission  - 25.44 WBC, 2.3 lactate, 888 Creatinine Kinase, Moderate Leukocyte esterase, 26-50 WBC   - s/p Zosyn in ED  - f/u Ucx, f/u Bcx  - f/u repeat lactate  - Continue Zosyn Pt was found to have a UTI on admission  On admission: 25.44 WBC, 2.3 lactate, 888 Creatinine Kinase, Moderate Leukocyte esterase, 26-50 WBC   WBC uptrending - monitor  Lactemia resolved  Continue Zosyn  Ucx, Bcx - f/u Pt was found to have a UTI on admission  On admission: 25.44 WBC, 2.3 lactate, 888 Creatinine Kinase, Moderate Leukocyte esterase, 26-50 WBC   BCx + for GNR; now bacteremic  WBC uptrending - monitor  Lactemia resolved  Continue Zosyn  Ucx - f/u  ID consulted - f/u recs

## 2023-01-16 DIAGNOSIS — R00.0 TACHYCARDIA, UNSPECIFIED: ICD-10-CM

## 2023-01-16 DIAGNOSIS — A41.9 SEPSIS, UNSPECIFIED ORGANISM: ICD-10-CM

## 2023-01-16 DIAGNOSIS — M62.82 RHABDOMYOLYSIS: ICD-10-CM

## 2023-01-16 DIAGNOSIS — G93.41 METABOLIC ENCEPHALOPATHY: ICD-10-CM

## 2023-01-16 LAB
ALBUMIN SERPL ELPH-MCNC: 2.1 G/DL — LOW (ref 3.3–5)
ALP SERPL-CCNC: 69 U/L — SIGNIFICANT CHANGE UP (ref 40–120)
ALT FLD-CCNC: 25 U/L — SIGNIFICANT CHANGE UP (ref 12–78)
ANION GAP SERPL CALC-SCNC: 8 MMOL/L — SIGNIFICANT CHANGE UP (ref 5–17)
AST SERPL-CCNC: 31 U/L — SIGNIFICANT CHANGE UP (ref 15–37)
BILIRUB SERPL-MCNC: 0.5 MG/DL — SIGNIFICANT CHANGE UP (ref 0.2–1.2)
BUN SERPL-MCNC: 32 MG/DL — HIGH (ref 7–23)
CALCIUM SERPL-MCNC: 8 MG/DL — LOW (ref 8.5–10.1)
CHLORIDE SERPL-SCNC: 109 MMOL/L — HIGH (ref 96–108)
CO2 SERPL-SCNC: 20 MMOL/L — LOW (ref 22–31)
CREAT SERPL-MCNC: 1.1 MG/DL — SIGNIFICANT CHANGE UP (ref 0.5–1.3)
EGFR: 49 ML/MIN/1.73M2 — LOW
GLUCOSE SERPL-MCNC: 87 MG/DL — SIGNIFICANT CHANGE UP (ref 70–99)
HCT VFR BLD CALC: 33.8 % — LOW (ref 34.5–45)
HGB BLD-MCNC: 10.7 G/DL — LOW (ref 11.5–15.5)
MAGNESIUM SERPL-MCNC: 2.2 MG/DL — SIGNIFICANT CHANGE UP (ref 1.6–2.6)
MCHC RBC-ENTMCNC: 29.7 PG — SIGNIFICANT CHANGE UP (ref 27–34)
MCHC RBC-ENTMCNC: 31.7 GM/DL — LOW (ref 32–36)
MCV RBC AUTO: 93.9 FL — SIGNIFICANT CHANGE UP (ref 80–100)
NRBC # BLD: 0 /100 WBCS — SIGNIFICANT CHANGE UP (ref 0–0)
PLATELET # BLD AUTO: 248 K/UL — SIGNIFICANT CHANGE UP (ref 150–400)
POTASSIUM SERPL-MCNC: 4.3 MMOL/L — SIGNIFICANT CHANGE UP (ref 3.5–5.3)
POTASSIUM SERPL-SCNC: 4.3 MMOL/L — SIGNIFICANT CHANGE UP (ref 3.5–5.3)
PROT SERPL-MCNC: 5.9 G/DL — LOW (ref 6–8.3)
RBC # BLD: 3.6 M/UL — LOW (ref 3.8–5.2)
RBC # FLD: 14.6 % — HIGH (ref 10.3–14.5)
SODIUM SERPL-SCNC: 137 MMOL/L — SIGNIFICANT CHANGE UP (ref 135–145)
WBC # BLD: 20.47 K/UL — HIGH (ref 3.8–10.5)
WBC # FLD AUTO: 20.47 K/UL — HIGH (ref 3.8–10.5)

## 2023-01-16 PROCEDURE — 74176 CT ABD & PELVIS W/O CONTRAST: CPT | Mod: 26

## 2023-01-16 PROCEDURE — 99232 SBSQ HOSP IP/OBS MODERATE 35: CPT

## 2023-01-16 PROCEDURE — 99233 SBSQ HOSP IP/OBS HIGH 50: CPT

## 2023-01-16 RX ORDER — LIDOCAINE 4 G/100G
1 CREAM TOPICAL DAILY
Refills: 0 | Status: DISCONTINUED | OUTPATIENT
Start: 2023-01-16 | End: 2023-01-20

## 2023-01-16 RX ADMIN — Medication 12.5 MILLIGRAM(S): at 18:54

## 2023-01-16 RX ADMIN — Medication 3 MILLIGRAM(S): at 21:27

## 2023-01-16 RX ADMIN — Medication 12.5 MILLIGRAM(S): at 05:42

## 2023-01-16 RX ADMIN — Medication 650 MILLIGRAM(S): at 16:42

## 2023-01-16 RX ADMIN — PIPERACILLIN AND TAZOBACTAM 25 GRAM(S): 4; .5 INJECTION, POWDER, LYOPHILIZED, FOR SOLUTION INTRAVENOUS at 05:42

## 2023-01-16 RX ADMIN — HEPARIN SODIUM 5000 UNIT(S): 5000 INJECTION INTRAVENOUS; SUBCUTANEOUS at 05:42

## 2023-01-16 RX ADMIN — PIPERACILLIN AND TAZOBACTAM 25 GRAM(S): 4; .5 INJECTION, POWDER, LYOPHILIZED, FOR SOLUTION INTRAVENOUS at 21:27

## 2023-01-16 RX ADMIN — PIPERACILLIN AND TAZOBACTAM 25 GRAM(S): 4; .5 INJECTION, POWDER, LYOPHILIZED, FOR SOLUTION INTRAVENOUS at 13:26

## 2023-01-16 RX ADMIN — HEPARIN SODIUM 5000 UNIT(S): 5000 INJECTION INTRAVENOUS; SUBCUTANEOUS at 18:54

## 2023-01-16 NOTE — PROGRESS NOTE ADULT - PROBLEM SELECTOR PLAN 11
Heparin for dvt prophylaxis Heparin for dvt prophylaxis  calf pain overnight now resolved, refused LE duplex, but rec she get, son will help in convincing    discussed with son bedside  pt rebecca

## 2023-01-16 NOTE — PROVIDER CONTACT NOTE (OTHER) - ACTION/TREATMENT ORDERED:
MD Chaidez notified and made aware. No intervention at this time. Continue to monitor.
Will continue to monitor per MD
MD notified  Will place order for lidocaine patch
MD Chaidez notified and made aware. Md to assess pt at bedside. Continue to monitor.
MD notified  Give 6 pm dose of metoprolol
Just administered ordered dose of metoprolol  Contact provider if HR persists
MD to order lidocaine patch  PRN tylenol to be administered

## 2023-01-16 NOTE — PROVIDER CONTACT NOTE (OTHER) - ASSESSMENT
Pt complaining of pain still  Tylenol PRN administered   All other vitals stable  No complaints other than pain
  /78  O2 92% on RA  Pt asymptomatic, offering no complaints
Pt complaining of pain in left hip, asking for pain medicine and lidocaine patch
Pt is awake and alert, watching TV, No S/S of distress noted.
Pt was sleeping, awoke pt to medicate as ordered.

## 2023-01-16 NOTE — PROGRESS NOTE ADULT - SUBJECTIVE AND OBJECTIVE BOX
[facility], Division of Infectious Diseases  LIAM Ibarra Y. Patel, S. Shah, G. [facility]  [phone]    Name: CHAVA JOSEPH  Age: 87y  Gender: Female  MRN: [id number]    Interval History:  Patient seen and examined at bedside this morning  No acute overnight events. Afebrile  No complaints   at bedside  Notes reviewed    Antibiotics:  piperacillin/tazobactam IVPB.. 3.375 Gram(s) IV Intermittent every 8 hours      Medications:  acetaminophen     Tablet .. 650 milliGRAM(s) Oral every 6 hours PRN  aluminum hydroxide/magnesium hydroxide/simethicone Suspension 30 milliLiter(s) Oral every 4 hours PRN  heparin   Injectable 5000 Unit(s) SubCutaneous every 12 hours  melatonin 3 milliGRAM(s) Oral at bedtime PRN  metoprolol tartrate 12.5 milliGRAM(s) Oral two times a day  ondansetron Injectable 4 milliGRAM(s) IV Push every 8 hours PRN  piperacillin/tazobactam IVPB.. 3.375 Gram(s) IV Intermittent every 8 hours      Review of Systems:  A 10-point review of systems was obtained.     Pertinent positives and negatives--  Constitutional: No fevers. No Chills. No Rigors.   Cardiovascular: No chest pain. No palpitations.  Respiratory: No shortness of breath. No cough.  Gastrointestinal: No nausea or vomiting. No diarrhea or constipation.   Psychiatric: Pleasant. Appropriate affect.    Review of systems otherwise negative except as previously noted.    Allergies: No Known Allergies    For details regarding the patient's past medical history, social history, family history, and other miscellaneous elements, please refer the initial infectious diseases consultation and/or the admitting history and physical examination for this admission.    Objective:  Vitals:   T(C): 36.8 (23 @ 05:11), Max: 37.1 (01-15-23 @ 21:07)  HR: 108 (23 @ 05:11) (96 - 125)  BP: 113/64 (23 @ 05:11) (99/63 - 147/78)  RR: 18 (23 @ 05:11) (17 - 18)  SpO2: 96% (23 @ 05:11) (91% - 99%)    Physical Examination:  General: no acute distress  HEENT: NC/AT, EOMI,  Cardio: S1, S2 heard, RRR, no murmurs  Resp: breath sounds heard bilaterally, no rales, wheezes or rhonchi  Abd: soft, NT, ND  Ext: no edema or cyanosis  Skin: warm, dry, no visible rash      Laboratory Studies:  CBC:                       10.7   20.47 )-----------( 248      ( 2023 06:25 )             33.8     CMP:     137  |  109<H>  |  32<H>  ----------------------------<  87  4.3   |  20<L>  |  1.10    Ca    8.0<L>      2023 06:25  Phos  2.6     01-15  Mg     2.2         TPro  5.9<L>  /  Alb  2.1<L>  /  TBili  0.5  /  DBili  x   /  AST  31  /  ALT  25  /  AlkPhos  69      LIVER FUNCTIONS - ( 2023 06:25 )  Alb: 2.1 g/dL / Pro: 5.9 g/dL / ALK PHOS: 69 U/L / ALT: 25 U/L / AST: 31 U/L / GGT: x           Urinalysis Basic - ( 2023 20:55 )    Color: Yellow / Appearance: Slightly Turbid / S.015 / pH: x  Gluc: x / Ketone: Trace  / Bili: Negative / Urobili: Negative   Blood: x / Protein: 100 / Nitrite: Negative   Leuk Esterase: Moderate / RBC: 3-5 /HPF / WBC 26-50   Sq Epi: x / Non Sq Epi: Occasional / Bacteria: Many        Microbiology: reviewed    Culture - Blood (collected 23 @ 21:30)  Source: .Blood Blood-Peripheral  Gram Stain (01-15-23 @ 11:49):    Growth in aerobic bottle: Gram Negative Rods    Growth in anaerobic bottle: Gram Negative Rods  Preliminary Report (23 @ 10:11):    Growth in aerobic and anaerobic bottles: Escherichia coli    ***Blood Panel PCR results on this specimen are available    approximately 3 hours after the Gram stain result.***    Gram stain, PCR, and/or culture results may not always    correspond due to difference in methodologies.    ************************************************************    This PCR assay was performed by multiplex PCR. This    Assay tests for 66 bacterial and resistance gene targets.    Please refer to the [facility] test directory    at [email or web address] for details.  Organism: Blood Culture PCR (01-15-23 @ 14:39)  Organism: Blood Culture PCR (01-15-23 @ 14:39)      -  Escherichia coli: Detec      Method Type: PCR    Culture - Blood (collected 23 @ 21:26)  Source: .Blood Blood-Peripheral  Gram Stain (01-15-23 @ 12:21):    Growth in anaerobic bottle: Gram Negative Rods    Growth in aerobic bottle: Gram Negative Rods  Preliminary Report (23 @ 08:57):    Growth in aerobic and anaerobic bottles: Escherichia coli    See previous culture [id number]    Culture - Urine (collected 23 @ 20:55)  Source: Clean Catch Clean Catch (Midstream)  Preliminary Report (01-15-23 @ 22:03):    >100,000 CFU/ml Escherichia coli          Radiology: reviewed    < from: CT Abdomen and Pelvis No Cont (23 @ 08:16) >    ACC: [id number] EXAM:  CT ABDOMEN AND PELVIS                          PROCEDURE DATE:  2023          INTERPRETATION:  CLINICAL INFORMATION: 87-year-old female patient with   urosepsis    COMPARISON: CT abdomen pelvis 2023.    CONTRAST/COMPLICATIONS:  IV Contrast: NONE  Oral Contrast: NONE  Complications: None reported at time of study completion    PROCEDURE:  CT of the Abdomen and Pelvis was performed.  Sagittal and coronal reformats were performed.    FINDINGS:  LOWER CHEST: Bilateral small pleural effusions with compression   atelectasis. Borderline enlarged heart. Coronary artery disease.    LIVER: Within normal limits.  BILE DUCTS: Normal caliber.  GALLBLADDER: Cholelithiasis.  SPLEEN: Within normal limits.  PANCREAS: Within normal limits.  ADRENALS: Within normal limits.  KIDNEYS/URETERS: The right kidney measures approximately 11.7 x 5.0 cm,   the left kidney 10.3 x 4.2 cm, there is slight asymmetric right   perinephric stranding, no hydronephrosis or hydroureter bilaterally. No   nephroureterolithiasis.  Numerous phleboliths are present in the pelvis.    BLADDER: Well-distended bladder without wall thickening or mass, no   bladder stones.  REPRODUCTIVE ORGANS: Uterus and adnexa within normal limits.    BOWEL: No bowel obstruction. Appendix is normal.  PERITONEUM: No ascites. No pneumoperitoneum or loculated collections.  VESSELS: Atherosclerosis. No abdominal aortic aneurysm.  RETROPERITONEUM/LYMPH NODES: No lymphadenopathy.  ABDOMINAL WALL: Within normal limits.  BONES: Osteopenia, scoliosis, multilevel discogenic disease.    IMPRESSION:  No obstructive uropathy or urolithiasis. Asymmetrically enlarged right   kidney with asymmetric right perinephric stranding may be related to the   patient's known urinarytract infection.    Bilateral small pleural effusions with compression atelectasis.        --- End of Report ---            SUNITA TERAN MD; Attending Radiologist  This document has been electronically signed. 2023 11:04AM    < end of copied text >

## 2023-01-16 NOTE — PROGRESS NOTE ADULT - SUBJECTIVE AND OBJECTIVE BOX
Patient is a 87y old  Female who presents with a chief complaint of UTI (2023 07:05)      SUBJECTIVE / OVERNIGHT EVENTS: No overnight events. Feels well, No complaints. Denies hip pain, back or LE pain. Denies chest pain, sob, dizziness, palpitations, n/v.   REfused echo and xray of pelvis    Tele reviewed: sinus with transient episodes to 140s      ADDITIONAL REVIEW OF SYSTEMS: Negative except for above    MEDICATIONS  (STANDING):  heparin   Injectable 5000 Unit(s) SubCutaneous every 12 hours  metoprolol tartrate 12.5 milliGRAM(s) Oral two times a day  piperacillin/tazobactam IVPB.. 3.375 Gram(s) IV Intermittent every 8 hours    MEDICATIONS  (PRN):  acetaminophen     Tablet .. 650 milliGRAM(s) Oral every 6 hours PRN Temp greater or equal to 38C (100.4F), Mild Pain (1 - 3)  aluminum hydroxide/magnesium hydroxide/simethicone Suspension 30 milliLiter(s) Oral every 4 hours PRN Dyspepsia  melatonin 3 milliGRAM(s) Oral at bedtime PRN Insomnia  ondansetron Injectable 4 milliGRAM(s) IV Push every 8 hours PRN Nausea and/or Vomiting      CAPILLARY BLOOD GLUCOSE        I&O's Summary      PHYSICAL EXAM:  Vital Signs Last 24 Hrs  T(C): 36.8 (2023 05:11), Max: 37.1 (15 Ron 2023 21:07)  T(F): 98.3 (2023 05:11), Max: 98.8 (15 Ron 2023 21:07)  HR: 108 (2023 05:11) (96 - 125)  BP: 113/64 (2023 05:11) (99/63 - 147/78)  BP(mean): --  RR: 18 (2023 05:11) (17 - 18)  SpO2: 96% (2023 05:11) (91% - 99%)    Parameters below as of 2023 05:11  Patient On (Oxygen Delivery Method): room air        PHYSICAL EXAM:  GENERAL: NAD, well-developed, nontoxic  HEAD:  Atraumatic, Normocephalic  EYES:  conjunctiva and sclera clear  NECK: Supple, No JVD  CHEST/LUNG: Clear to auscultation bilaterally;   HEART: Regular rate and rhythm;   ABDOMEN: Soft, Nontender, Nondistended; Bowel sounds present  EXTREMITIES:  2+ Peripheral Pulses, No clubbing, cyanosis, or edema  PSYCH: AAOx3  NEUROLOGY: non-focal  SKIN: No rashes or lesions  MSK: able to extend/ flex hips with no pain      LABS:                        10.7   20.47 )-----------( 248      ( 2023 06:25 )             33.8     01-16    137  |  109<H>  |  32<H>  ----------------------------<  87  4.3   |  20<L>  |  1.10    Ca    8.0<L>      2023 06:25  Phos  2.6     01-15  Mg     2.2     01-16    TPro  5.9<L>  /  Alb  2.1<L>  /  TBili  0.5  /  DBili  x   /  AST  31  /  ALT  25  /  AlkPhos  69  01-16    PT/INR - ( 2023 19:00 )   PT: 14.6 sec;   INR: 1.25 ratio         PTT - ( 2023 19:00 )  PTT:30.1 sec  CARDIAC MARKERS ( 15 Ron 2023 08:34 )  x     / x     / 524 U/L / x     / x      CARDIAC MARKERS ( 2023 19:00 )  x     / x     / 888 U/L / x     / x          Urinalysis Basic - ( 2023 20:55 )    Color: Yellow / Appearance: Slightly Turbid / S.015 / pH: x  Gluc: x / Ketone: Trace  / Bili: Negative / Urobili: Negative   Blood: x / Protein: 100 / Nitrite: Negative   Leuk Esterase: Moderate / RBC: 3-5 /HPF / WBC 26-50   Sq Epi: x / Non Sq Epi: Occasional / Bacteria: Many        Culture - Blood (collected 2023 21:30)  Source: .Blood Blood-Peripheral  Gram Stain (15 Ron 2023 11:49):    Growth in aerobic bottle: Gram Negative Rods    Growth in anaerobic bottle: Gram Negative Rods  Preliminary Report (2023 10:11):    Growth in aerobic and anaerobic bottles: Escherichia coli    ***Blood Panel PCR results on this specimen are available    approximately 3 hours after the Gram stain result.***    Gram stain, PCR, and/or culture results may not always    correspond due to difference in methodologies.    ************************************************************    This PCR assay was performed by multiplex PCR. This    Assay tests for 66 bacterial and resistance gene targets.    Please refer to the [facility] test directory    at [email or web address] for details.  Organism: Blood Culture PCR (15 Ron 2023 14:39)  Organism: Blood Culture PCR (15 Ron 2023 14:39)    Culture - Blood (collected 2023 21:26)  Source: .Blood Blood-Peripheral  Gram Stain (15 Ron 2023 12:21):    Growth in anaerobic bottle: Gram Negative Rods    Growth in aerobic bottle: Gram Negative Rods  Preliminary Report (2023 08:57):    Growth in aerobic and anaerobic bottles: Escherichia coli    See previous culture [id number]    Culture - Urine (collected 2023 20:55)  Source: Clean Catch Clean Catch (Midstream)  Preliminary Report (15 Ron 2023 22:03):    >100,000 CFU/ml Escherichia coli        RADIOLOGY & ADDITIONAL TESTS:    Imaging Personally Reviewed:    Electrocardiogram Personally Reviewed:    COORDINATION OF CARE:  Care Discussed with Consultants/Other Providers [Y/N]:  Prior or Outpatient Records Reviewed [Y/N]:     Patient is a 87y old  Female who presents with a chief complaint of UTI (2023 07:05)      SUBJECTIVE / OVERNIGHT EVENTS: No overnight events aide from calf pain thus was ordered for LE duplex she refused. Feels well, No complaints. Denies hip pain, back or LE pain. Denies chest pain, sob, dizziness, palpitations, n/v.   REfused echo and xray of pelvis    Tele reviewed: sinus with transient episodes to 140s      ADDITIONAL REVIEW OF SYSTEMS: Negative except for above    MEDICATIONS  (STANDING):  heparin   Injectable 5000 Unit(s) SubCutaneous every 12 hours  metoprolol tartrate 12.5 milliGRAM(s) Oral two times a day  piperacillin/tazobactam IVPB.. 3.375 Gram(s) IV Intermittent every 8 hours    MEDICATIONS  (PRN):  acetaminophen     Tablet .. 650 milliGRAM(s) Oral every 6 hours PRN Temp greater or equal to 38C (100.4F), Mild Pain (1 - 3)  aluminum hydroxide/magnesium hydroxide/simethicone Suspension 30 milliLiter(s) Oral every 4 hours PRN Dyspepsia  melatonin 3 milliGRAM(s) Oral at bedtime PRN Insomnia  ondansetron Injectable 4 milliGRAM(s) IV Push every 8 hours PRN Nausea and/or Vomiting      CAPILLARY BLOOD GLUCOSE        I&O's Summary      PHYSICAL EXAM:  Vital Signs Last 24 Hrs  T(C): 36.8 (2023 05:11), Max: 37.1 (15 Ron 2023 21:07)  T(F): 98.3 (2023 05:11), Max: 98.8 (15 Ron 2023 21:07)  HR: 108 (2023 05:11) (96 - 125)  BP: 113/64 (2023 05:11) (99/63 - 147/78)  BP(mean): --  RR: 18 (2023 05:11) (17 - 18)  SpO2: 96% (2023 05:11) (91% - 99%)    Parameters below as of 2023 05:11  Patient On (Oxygen Delivery Method): room air        PHYSICAL EXAM:  GENERAL: NAD, well-developed, nontoxic  HEAD:  Atraumatic, Normocephalic  EYES:  conjunctiva and sclera clear  NECK: Supple, No JVD  CHEST/LUNG: Clear to auscultation bilaterally;   HEART: Regular rate and rhythm;   ABDOMEN: Soft, Nontender, Nondistended; Bowel sounds present  EXTREMITIES:  2+ Peripheral Pulses, No clubbing, cyanosis, or edema  PSYCH: AAOx3  NEUROLOGY: non-focal  SKIN: No rashes or lesions  MSK: able to extend/ flex hips with no pain      LABS:                        10.7   20.47 )-----------( 248      ( 2023 06:25 )             33.8     01-16    137  |  109<H>  |  32<H>  ----------------------------<  87  4.3   |  20<L>  |  1.10    Ca    8.0<L>      2023 06:25  Phos  2.6     01-15  Mg     2.2     -16    TPro  5.9<L>  /  Alb  2.1<L>  /  TBili  0.5  /  DBili  x   /  AST  31  /  ALT  25  /  AlkPhos  69  01-16    PT/INR - ( 2023 19:00 )   PT: 14.6 sec;   INR: 1.25 ratio         PTT - ( 2023 19:00 )  PTT:30.1 sec  CARDIAC MARKERS ( 15 Ron 2023 08:34 )  x     / x     / 524 U/L / x     / x      CARDIAC MARKERS ( 2023 19:00 )  x     / x     / 888 U/L / x     / x          Urinalysis Basic - ( 2023 20:55 )    Color: Yellow / Appearance: Slightly Turbid / S.015 / pH: x  Gluc: x / Ketone: Trace  / Bili: Negative / Urobili: Negative   Blood: x / Protein: 100 / Nitrite: Negative   Leuk Esterase: Moderate / RBC: 3-5 /HPF / WBC 26-50   Sq Epi: x / Non Sq Epi: Occasional / Bacteria: Many        Culture - Blood (collected 2023 21:30)  Source: .Blood Blood-Peripheral  Gram Stain (15 Ron 2023 11:49):    Growth in aerobic bottle: Gram Negative Rods    Growth in anaerobic bottle: Gram Negative Rods  Preliminary Report (2023 10:11):    Growth in aerobic and anaerobic bottles: Escherichia coli    ***Blood Panel PCR results on this specimen are available    approximately 3 hours after the Gram stain result.***    Gram stain, PCR, and/or culture results may not always    correspond due to difference in methodologies.    ************************************************************    This PCR assay was performed by multiplex PCR. This    Assay tests for 66 bacterial and resistance gene targets.    Please refer to the [facility] Labs test directory    at [email or web address] for details.  Organism: Blood Culture PCR (15 Ron 2023 14:39)  Organism: Blood Culture PCR (15 Ron 2023 14:39)    Culture - Blood (collected 2023 21:26)  Source: .Blood Blood-Peripheral  Gram Stain (15 Ron 2023 12:21):    Growth in anaerobic bottle: Gram Negative Rods    Growth in aerobic bottle: Gram Negative Rods  Preliminary Report (2023 08:57):    Growth in aerobic and anaerobic bottles: Escherichia coli    See previous culture [id number]    Culture - Urine (collected 2023 20:55)  Source: Clean Catch Clean Catch (Midstream)  Preliminary Report (15 Ron 2023 22:03):    >100,000 CFU/ml Escherichia coli        RADIOLOGY & ADDITIONAL TESTS:    Imaging Personally Reviewed:    Electrocardiogram Personally Reviewed:    COORDINATION OF CARE:  Care Discussed with Consultants/Other Providers [Y/N]:  Prior or Outpatient Records Reviewed [Y/N]:

## 2023-01-16 NOTE — PROGRESS NOTE ADULT - PROBLEM SELECTOR PLAN 8
Pt had unwitnessed fall on day prior to admission  now pain resolved  CT showing age indeterminate acetabular fracture  Ortho following, recs appreciated  - No acute orthopedic surgical intervention at this time  - XR pelvis, Judet views, inlet/outlet rec but patient refusing and says no pain  - PT eval  - To consider MRI L hip if patient not able to ambulate due to pain recoccurs

## 2023-01-16 NOTE — PROGRESS NOTE ADULT - PROBLEM SELECTOR PLAN 4
tachycardia likely 2/2 infection, pain, ?pSVT  - telemetry most with HR in 80s with transient elevations to 140s with pacs and SVT per cards  -k improved after repletion  - cards consult appreciated   - on metoprolol 12.5mg bid  - TTE (Refused), encouraged her to get it

## 2023-01-16 NOTE — PROGRESS NOTE ADULT - SUBJECTIVE AND OBJECTIVE BOX
[facility] -- Jeremi Perkins Pannella, Patel, Savella [facility]  Office # [phone]      Follow Up:    tachycardia   Subjective/Observations:     No events overnight resting comfortably in bed.  No complaints of chest pain, dyspnea, or palpitations reported. No signs of orthopnea or PND.  family at bedside,   admits to decreased PO intake     REVIEW OF SYSTEMS: All other review of systems is negative unless indicated above    PAST MEDICAL & SURGICAL HISTORY:  Hypertension          MEDICATIONS  (STANDING):  heparin   Injectable 5000 Unit(s) SubCutaneous every 12 hours  metoprolol tartrate 12.5 milliGRAM(s) Oral two times a day  piperacillin/tazobactam IVPB.. 3.375 Gram(s) IV Intermittent every 8 hours  sodium chloride 0.9%. 1000 milliLiter(s) (100 mL/Hr) IV Continuous <Continuous>    MEDICATIONS  (PRN):  acetaminophen     Tablet .. 650 milliGRAM(s) Oral every 6 hours PRN Temp greater or equal to 38C (100.4F), Mild Pain (1 - 3)  aluminum hydroxide/magnesium hydroxide/simethicone Suspension 30 milliLiter(s) Oral every 4 hours PRN Dyspepsia  melatonin 3 milliGRAM(s) Oral at bedtime PRN Insomnia  ondansetron Injectable 4 milliGRAM(s) IV Push every 8 hours PRN Nausea and/or Vomiting      Allergies    No Known Allergies    Intolerances        Vital Signs Last 24 Hrs  T(C): 36.8 (2023 05:11), Max: 37.1 (15 Ron 2023 21:07)  T(F): 98.3 (2023 05:11), Max: 98.8 (15 Ron 2023 21:07)  HR: 108 (2023 05:11) (96 - 125)  BP: 113/64 (2023 05:11) (99/63 - 147/78)  BP(mean): --  RR: 18 (2023 05:11) (17 - 18)  SpO2: 96% (2023 05:11) (91% - 99%)    Parameters below as of 2023 05:11  Patient On (Oxygen Delivery Method): room air        I&O's Summary        PHYSICAL EXAM:  TELE: SR_ST   Constitutional: NAD, awake and alert, well-developed  HEENT: Moist Mucous Membranes, Anicteric  Pulmonary: Non-labored, breath sounds are clear bilaterally, No wheezing, crackles or rhonchi  Cardiovascular: Regular, S1 and S2 nl, No murmurs, rubs, gallops or clicks  Gastrointestinal: Bowel Sounds present, soft, nontender.   Lymph: No lymphadenopathy. No peripheral edema.  Skin: No visible rashes or ulcers.  Psych:  Mood & affect appropriate    LABS: All Labs Reviewed:                        10.7   20.47 )-----------( 248      ( 2023 06:25 )             33.8                         10.1   26.99 )-----------( 247      ( 15 Ron 2023 08:34 )             30.7                         12.0   25.44 )-----------( 311      ( 2023 19:00 )             36.1     2023 06:25    137    |  109    |  32     ----------------------------<  87     4.3     |  20     |  1.10   15 Ron 2023 08:34    133    |  99     |  38     ----------------------------<  104    3.0     |  27     |  1.50   2023 19:00    130    |  95     |  42     ----------------------------<  113    3.2     |  27     |  1.80     Ca    8.0        2023 06:25  Ca    7.9        15 Ron 2023 08:34  Ca    8.4        2023 19:00  Phos  2.6       15 Ron 2023 08:34  Mg     2.2       2023 06:25  Mg     2.1       15 Ron 2023 08:34    TPro  5.9    /  Alb  2.1    /  TBili  0.5    /  DBili  x      /  AST  31     /  ALT  25     /  AlkPhos  69     2023 06:25  TPro  6.2    /  Alb  2.3    /  TBili  0.8    /  DBili  x      /  AST  43     /  ALT  26     /  AlkPhos  67     15 Ron 2023 08:34  TPro  7.5    /  Alb  2.9    /  TBili  1.0    /  DBili  x      /  AST  56     /  ALT  32     /  AlkPhos  82     2023 19:00    PT/INR - ( 2023 19:00 )   PT: 14.6 sec;   INR: 1.25 ratio         PTT - ( 2023 19:00 )  PTT:30.1 sec  CARDIAC MARKERS ( 15 Ron 2023 08:34 )  x     / x     / 524 U/L / x     / x      CARDIAC MARKERS ( 2023 19:00 )  x     / x     / 888 U/L / x     / x             EC Lead ECG:   Ventricular Rate 82 BPM    Atrial Rate 82 BPM    P-R Interval 128 ms    QRS Duration 92 ms    Q-T Interval 386 ms    QTC Calculation(Bazett) 450 ms    P Axis 40 degrees    R Axis -26 degrees    T Axis 25 degrees    Diagnosis Line Sinus rhythm with premature supraventricular complexes  Otherwise normal ECG  When compared with ECG of 15-RON-2023 03:26,  No significant change was found  Confirmed by Lela Soto ([id number]) on 2023 9:09:57 AM (01-15-23 @ 09:34)        Radiology:

## 2023-01-16 NOTE — PROGRESS NOTE ADULT - SUBJECTIVE AND OBJECTIVE BOX
Patient seen and examined at bedside. No acute complaints at this time. Denies pain at the left hip. Denies chest pain, shortness of breath, nausea or vomiting. Has not gotten up or walked with PT yet.    LABS:                        10.1   26.99 )-----------( 247      ( 15 Ron 2023 08:34 )             30.7     01-15    133<L>  |  99  |  38<H>  ----------------------------<  104<H>  3.0<L>   |  27  |  1.50<H>    Ca    7.9<L>      15 Ron 2023 08:34  Phos  2.6     01-15  Mg     2.1     01-15    TPro  6.2  /  Alb  2.3<L>  /  TBili  0.8  /  DBili  x   /  AST  43<H>  /  ALT  26  /  AlkPhos  67  01-15    PT/INR - ( 2023 19:00 )   PT: 14.6 sec;   INR: 1.25 ratio         PTT - ( 2023 19:00 )  PTT:30.1 sec  Urinalysis Basic - ( 2023 20:55 )    Color: Yellow / Appearance: Slightly Turbid / S.015 / pH: x  Gluc: x / Ketone: Trace  / Bili: Negative / Urobili: Negative   Blood: x / Protein: 100 / Nitrite: Negative   Leuk Esterase: Moderate / RBC: 3-5 /HPF / WBC 26-50   Sq Epi: x / Non Sq Epi: Occasional / Bacteria: Many        VITAL SIGNS:  T(C): 36.8 (23 @ 05:11), Max: 37.1 (01-15-23 @ 21:07)  HR: 108 (23 @ 05:11) (88 - 125)  BP: 113/64 (23 @ 05:11) (99/63 - 147/78)  RR: 18 (23 @ 05:11) (17 - 18)  SpO2: 96% (23 @ 05:11) (91% - 99%)    PHYSICAL EXAM  General: NAD, Awake and Alert  LLE  skin intact  TTP greater troch  SILT L2-S1  Able to SLR  no pain with axial load or log roll  +EHL/FHL/TA/GSC  palpable DP  compartments soft and compressible  L calf TTP      IMAGING:  XR L hip: no acute fractures of dislocation; pending official read   CT pelvis: age indeterminate fracture through superior osteophyte of acetabulum    Assessment/Plan:  87y Female with age indeterminate fracture through superior osteophyte of acetabulum s/p mechanical fall yesterday    -FU BLLE doppler  -Imaging reviewed with Dr. Carlos  -No acute orthopedic surgical intervention at this time  -FU XR pelvis, Judet views, inlet/outlet  -Pain control as needed  -DVT ppx per primary team  -WBAT LLE  -PT/OT  -will consider MRI L hip if patient not able to ambulate due to pain  -Discussed with Dr. Carlos who agrees with plan   Patient seen and examined at bedside. No acute complaints at this time. Denies pain at the left hip. Denies chest pain, shortness of breath, nausea or vomiting. Has not gotten up or walked with PT yet.    LABS:                        10.1   26.99 )-----------( 247      ( 15 Ron 2023 08:34 )             30.7     01-15    133<L>  |  99  |  38<H>  ----------------------------<  104<H>  3.0<L>   |  27  |  1.50<H>    Ca    7.9<L>      15 Ron 2023 08:34  Phos  2.6     01-15  Mg     2.1     01-15    TPro  6.2  /  Alb  2.3<L>  /  TBili  0.8  /  DBili  x   /  AST  43<H>  /  ALT  26  /  AlkPhos  67  01-15    PT/INR - ( 2023 19:00 )   PT: 14.6 sec;   INR: 1.25 ratio         PTT - ( 2023 19:00 )  PTT:30.1 sec  Urinalysis Basic - ( 2023 20:55 )    Color: Yellow / Appearance: Slightly Turbid / S.015 / pH: x  Gluc: x / Ketone: Trace  / Bili: Negative / Urobili: Negative   Blood: x / Protein: 100 / Nitrite: Negative   Leuk Esterase: Moderate / RBC: 3-5 /HPF / WBC 26-50   Sq Epi: x / Non Sq Epi: Occasional / Bacteria: Many        VITAL SIGNS:  T(C): 36.8 (23 @ 05:11), Max: 37.1 (01-15-23 @ 21:07)  HR: 108 (23 @ 05:11) (88 - 125)  BP: 113/64 (23 @ 05:11) (99/63 - 147/78)  RR: 18 (23 @ 05:11) (17 - 18)  SpO2: 96% (23 @ 05:11) (91% - 99%)    PHYSICAL EXAM  General: NAD, Awake and Alert  LLE  skin intact  TTP greater troch  SILT L2-S1  Able to SLR  no pain with axial load or log roll  +EHL/FHL/TA/GSC  palpable DP  compartments soft and compressible  L calf TTP      IMAGING:  XR L hip: no acute fractures of dislocation; pending official read   CT pelvis: age indeterminate fracture through superior osteophyte of acetabulum    Assessment/Plan:  87y Female with age indeterminate fracture through superior osteophyte of acetabulum s/p mechanical fall yesterday    -FU BLLE doppler  -Imaging reviewed with Dr. Carlos  -No acute orthopedic surgical intervention at this time  -XR pelvis, Judet views, inlet/outlet ordered but pt refused as she's not having any pain, no further XR at this time  -Pain control as needed  -DVT ppx per primary team  -WBAT LLE  -PT/OT  -will sign off at this time, please reconsult as needed  -Discussed with Dr. Carlos who agrees with plan

## 2023-01-16 NOTE — PHYSICAL THERAPY INITIAL EVALUATION ADULT - PERTINENT HX OF CURRENT PROBLEM, REHAB EVAL
88yo female with pmhx htn who presents 1 day after an unwitnessed fall admitted for severe sepsis with gram negative bacteremia metabolic encephalopathy admitted for a UTI, age indeterminate fracture of acetabulum, and tachycardia. CT showing age indeterminate acetabular fracture.

## 2023-01-16 NOTE — PROVIDER CONTACT NOTE (OTHER) - REASON
HR went into the 140s, not sustaining per tele tech Sarkis
Elevated HR w/ PAC's & PVC's
Remote tele
Per tele tech pts HR sustaining in the 130s

## 2023-01-16 NOTE — PROGRESS NOTE ADULT - PROBLEM SELECTOR PLAN 1
Severe sepsis with ecoli bacteremia and Metabolic encephalopathy due to ecoli UTI  -c/w iv zosyn  -blood cx with ecoli  -repeat blood cx pending  -wbc 20  from 25, trend cbc  -ID consult appreciated  -ct abd/pelvis: No obstructive uropathy or urolithiasis. Asymmetrically enlarged right kidney with asymmetric right perinephric stranding   -lactate resolved 1.1 from 2.3  -bp stable, small b/l pleural effusions, d/c IVF, encourage po intake

## 2023-01-16 NOTE — PROGRESS NOTE ADULT - PROBLEM SELECTOR PLAN 10
102/70 BP in ED  Hold home amlodipine valsartan due to LEIGH and soft BP bp stable  Hold home amlodipine valsartan due to LEIGH and soft BP  metoprolol as avoce

## 2023-01-16 NOTE — PROVIDER CONTACT NOTE (OTHER) - DATE AND TIME:
15-Ron-2023 17:04
16-Jan-2023 16:35
15-Ron-2023 08:47
16-Jan-2023 00:40
15-Ron-2023
16-Jan-2023 17:55
16-Jan-2023 05:55

## 2023-01-16 NOTE — PROGRESS NOTE ADULT - ASSESSMENT
88yo female with pmhx htn who presents 1 day after an unwitnessed fall, pt remembers fall and denies LOC.   Cardiology called for tachycardia.  Pt currently c/o back pain, otherwise feels well.  She was found to have UTI    Tele revealing Sr - ST   tachycardia multifactorial in setting of dehydration pain and underlying infection UTI rhabdo CK trending down   BB started 1/16 bp soft 113/64 continue to hold home medications norvasc and valsartan   EKG NSR with PACs   Echo pending   Monitor and replete electrolytes. Keep K>4.0 and Mg>2.0.  Tana Cage FNP-C  Cardiology NP  [phone]

## 2023-01-16 NOTE — PROGRESS NOTE ADULT - ASSESSMENT
88yo female PMH Dementia htn admitted with Ecoli sepis with bacteremia sec UTI  Blood cultures with GNR- Ecoli by pcr.  Marked leukocytosis, downtrending   LEIGH  CT w/ enlarged R kidney w/ perinephric stranding    Plan :   Cont Zosyn  Fu cultures, susceptibilities pending  -- BCx + E.coli  -- repeat BCx pending  -- UCx E.coli  Trend temps and cbc  Asp precautions    Covering Dr. Carrington  Infectious Diseases will continue to follow. Please call with any questions.   Abbie Ahmadi M.D.  [facility] [phone]   86yo female PMH Dementia htn admitted with Ecoli sepis with bacteremia sec UTI  Blood cultures with GNR- Ecoli by pcr.  Marked leukocytosis, downtrending   LEIGH  CT w/ enlarged R kidney w/ perinephric stranding    Plan :   Cont Zosyn  Fu cultures, susceptibilities pending  -- BCx + E.coli  -- repeat BCx pending  -- UCx E.coli  Trend temps and cbc  Asp precautions    Dr. Nolasco to resume care in   Infectious Diseases will continue to follow. Please call with any questions.   Abbie Ahmadi M.D.  [facility] [phone]

## 2023-01-16 NOTE — PROGRESS NOTE ADULT - ASSESSMENT
88yo female with pmhx htn who presents 1 day after an unwitnessed fall admitted for severe sepsis with gram negative bacteremia metabolic encephalopathy admitted for a UTI, age indeterminate fracture of acetabulum, and tachycardia

## 2023-01-17 ENCOUNTER — TRANSCRIPTION ENCOUNTER (OUTPATIENT)
Age: 88
End: 2023-01-17

## 2023-01-17 LAB
-  AMIKACIN: SIGNIFICANT CHANGE UP
-  AMPICILLIN/SULBACTAM: SIGNIFICANT CHANGE UP
-  AMPICILLIN: SIGNIFICANT CHANGE UP
-  AZTREONAM: SIGNIFICANT CHANGE UP
-  CEFAZOLIN: SIGNIFICANT CHANGE UP
-  CEFEPIME: SIGNIFICANT CHANGE UP
-  CEFOXITIN: SIGNIFICANT CHANGE UP
-  CEFTRIAXONE: SIGNIFICANT CHANGE UP
-  CIPROFLOXACIN: SIGNIFICANT CHANGE UP
-  ERTAPENEM: SIGNIFICANT CHANGE UP
-  GENTAMICIN: SIGNIFICANT CHANGE UP
-  IMIPENEM: SIGNIFICANT CHANGE UP
-  LEVOFLOXACIN: SIGNIFICANT CHANGE UP
-  MEROPENEM: SIGNIFICANT CHANGE UP
-  PIPERACILLIN/TAZOBACTAM: SIGNIFICANT CHANGE UP
-  TOBRAMYCIN: SIGNIFICANT CHANGE UP
-  TRIMETHOPRIM/SULFAMETHOXAZOLE: SIGNIFICANT CHANGE UP
ANION GAP SERPL CALC-SCNC: 5 MMOL/L — SIGNIFICANT CHANGE UP (ref 5–17)
BUN SERPL-MCNC: 33 MG/DL — HIGH (ref 7–23)
CALCIUM SERPL-MCNC: 8.2 MG/DL — LOW (ref 8.5–10.1)
CHLORIDE SERPL-SCNC: 107 MMOL/L — SIGNIFICANT CHANGE UP (ref 96–108)
CO2 SERPL-SCNC: 23 MMOL/L — SIGNIFICANT CHANGE UP (ref 22–31)
CREAT SERPL-MCNC: 1.1 MG/DL — SIGNIFICANT CHANGE UP (ref 0.5–1.3)
CULTURE RESULTS: SIGNIFICANT CHANGE UP
CULTURE RESULTS: SIGNIFICANT CHANGE UP
EGFR: 49 ML/MIN/1.73M2 — LOW
GLUCOSE SERPL-MCNC: 96 MG/DL — SIGNIFICANT CHANGE UP (ref 70–99)
HCT VFR BLD CALC: 31.9 % — LOW (ref 34.5–45)
HGB BLD-MCNC: 10.1 G/DL — LOW (ref 11.5–15.5)
MCHC RBC-ENTMCNC: 29.6 PG — SIGNIFICANT CHANGE UP (ref 27–34)
MCHC RBC-ENTMCNC: 31.7 GM/DL — LOW (ref 32–36)
MCV RBC AUTO: 93.5 FL — SIGNIFICANT CHANGE UP (ref 80–100)
METHOD TYPE: SIGNIFICANT CHANGE UP
NRBC # BLD: 0 /100 WBCS — SIGNIFICANT CHANGE UP (ref 0–0)
ORGANISM # SPEC MICROSCOPIC CNT: SIGNIFICANT CHANGE UP
PLATELET # BLD AUTO: 285 K/UL — SIGNIFICANT CHANGE UP (ref 150–400)
POTASSIUM SERPL-MCNC: 3.7 MMOL/L — SIGNIFICANT CHANGE UP (ref 3.5–5.3)
POTASSIUM SERPL-SCNC: 3.7 MMOL/L — SIGNIFICANT CHANGE UP (ref 3.5–5.3)
RBC # BLD: 3.41 M/UL — LOW (ref 3.8–5.2)
RBC # FLD: 14.8 % — HIGH (ref 10.3–14.5)
SODIUM SERPL-SCNC: 135 MMOL/L — SIGNIFICANT CHANGE UP (ref 135–145)
SPECIMEN SOURCE: SIGNIFICANT CHANGE UP
SPECIMEN SOURCE: SIGNIFICANT CHANGE UP
WBC # BLD: 13.39 K/UL — HIGH (ref 3.8–10.5)
WBC # FLD AUTO: 13.39 K/UL — HIGH (ref 3.8–10.5)

## 2023-01-17 PROCEDURE — 99233 SBSQ HOSP IP/OBS HIGH 50: CPT | Mod: GC

## 2023-01-17 PROCEDURE — 99232 SBSQ HOSP IP/OBS MODERATE 35: CPT

## 2023-01-17 RX ORDER — MENTHOL AND METHYL SALICYLATE 10; 30 G/100G; G/100G
1 CREAM TOPICAL
Refills: 0 | Status: DISCONTINUED | OUTPATIENT
Start: 2023-01-17 | End: 2023-01-20

## 2023-01-17 RX ORDER — CEFTRIAXONE 500 MG/1
1000 INJECTION, POWDER, FOR SOLUTION INTRAMUSCULAR; INTRAVENOUS EVERY 24 HOURS
Refills: 0 | Status: DISCONTINUED | OUTPATIENT
Start: 2023-01-17 | End: 2023-01-18

## 2023-01-17 RX ORDER — PIPERACILLIN AND TAZOBACTAM 4; .5 G/20ML; G/20ML
3.38 INJECTION, POWDER, LYOPHILIZED, FOR SOLUTION INTRAVENOUS EVERY 8 HOURS
Refills: 0 | Status: DISCONTINUED | OUTPATIENT
Start: 2023-01-17 | End: 2023-01-17

## 2023-01-17 RX ADMIN — HEPARIN SODIUM 5000 UNIT(S): 5000 INJECTION INTRAVENOUS; SUBCUTANEOUS at 05:40

## 2023-01-17 RX ADMIN — HEPARIN SODIUM 5000 UNIT(S): 5000 INJECTION INTRAVENOUS; SUBCUTANEOUS at 17:01

## 2023-01-17 RX ADMIN — Medication 650 MILLIGRAM(S): at 17:00

## 2023-01-17 RX ADMIN — Medication 12.5 MILLIGRAM(S): at 05:39

## 2023-01-17 RX ADMIN — Medication 12.5 MILLIGRAM(S): at 17:01

## 2023-01-17 RX ADMIN — LIDOCAINE 1 PATCH: 4 CREAM TOPICAL at 19:55

## 2023-01-17 RX ADMIN — PIPERACILLIN AND TAZOBACTAM 25 GRAM(S): 4; .5 INJECTION, POWDER, LYOPHILIZED, FOR SOLUTION INTRAVENOUS at 05:39

## 2023-01-17 RX ADMIN — MENTHOL AND METHYL SALICYLATE 1 APPLICATION(S): 10; 30 CREAM TOPICAL at 17:07

## 2023-01-17 RX ADMIN — LIDOCAINE 1 PATCH: 4 CREAM TOPICAL at 13:55

## 2023-01-17 RX ADMIN — CEFTRIAXONE 100 MILLIGRAM(S): 500 INJECTION, POWDER, FOR SOLUTION INTRAMUSCULAR; INTRAVENOUS at 13:58

## 2023-01-17 NOTE — DIETITIAN INITIAL EVALUATION ADULT - PERTINENT MEDS FT
MEDICATIONS  (STANDING):  heparin   Injectable 5000 Unit(s) SubCutaneous every 12 hours  lidocaine   4% Patch 1 Patch Transdermal daily  metoprolol tartrate 12.5 milliGRAM(s) Oral two times a day  piperacillin/tazobactam IVPB.. 3.375 Gram(s) IV Intermittent every 8 hours    MEDICATIONS  (PRN):  acetaminophen     Tablet .. 650 milliGRAM(s) Oral every 6 hours PRN Temp greater or equal to 38C (100.4F), Mild Pain (1 - 3)  aluminum hydroxide/magnesium hydroxide/simethicone Suspension 30 milliLiter(s) Oral every 4 hours PRN Dyspepsia  melatonin 3 milliGRAM(s) Oral at bedtime PRN Insomnia  ondansetron Injectable 4 milliGRAM(s) IV Push every 8 hours PRN Nausea and/or Vomiting

## 2023-01-17 NOTE — PROGRESS NOTE ADULT - PROBLEM SELECTOR PLAN 7
Pt had unwitnessed fall on day prior to admission. Pain improving.   - CT showing age indeterminate acetabular fracture  - No acute orthopedic surgical intervention at this time  - XR pelvis, Judet views, inlet/outlet rec but patient refusing and says no pain  - To consider MRI L hip if patient pain persists and patient not able to ambulate   - Ortho consulted, recs appreciated  - PT eval, recommends EVERT

## 2023-01-17 NOTE — PROGRESS NOTE ADULT - PROBLEM SELECTOR PLAN 2
Per son, patient has short term memory loss and confabulates at baseline  - mental status improving to baseline, patient responsive and interactive

## 2023-01-17 NOTE — DISCHARGE NOTE PROVIDER - DETAILS OF MALNUTRITION DIAGNOSIS/DIAGNOSES
This patient has been assessed with a concern for Malnutrition and was treated during this hospitalization for the following Nutrition diagnosis/diagnoses:     -  01/17/2023: Mild protein-calorie malnutrition

## 2023-01-17 NOTE — DIETITIAN NUTRITION RISK NOTIFICATION - ADDITIONAL COMMENTS/DIETITIAN RECOMMENDATIONS
follow weights this admit  patient with low PO for fairly significant time per family will follow PO this admit  recommend liberalize diet to low Na with ensure plus HP BID

## 2023-01-17 NOTE — DIETITIAN INITIAL EVALUATION ADULT - NS FNS DIET ORDER

## 2023-01-17 NOTE — PROGRESS NOTE ADULT - SUBJECTIVE AND OBJECTIVE BOX
[facility]  Shorty Nolasco MD PhD, Jenna Hayward MD, Abbie Ahmadi MD, Shayy Philip MD, Ralph Lares MD  and providing coverage with Daisy Carrington MD  Providing Infectious Disease Consultations at [facility] [place], [facility]'s    Office# [phone] to schedule follow up appointments  Answering Service for urgent calls or New Consults [phone]  Cell# to text for urgent issues Shorty Nolasco [phone]     infectious diseases progress note:    CHAVA JOSEPH is a 87y y. o. Female patient    Overnight and events of the last 24hrs reviewed    Allergies    No Known Allergies    Intolerances        ANTIBIOTICS/RELEVANT:  antimicrobials  piperacillin/tazobactam IVPB.. 3.375 Gram(s) IV Intermittent every 8 hours    immunologic:    OTHER:  acetaminophen     Tablet .. 650 milliGRAM(s) Oral every 6 hours PRN  aluminum hydroxide/magnesium hydroxide/simethicone Suspension 30 milliLiter(s) Oral every 4 hours PRN  heparin   Injectable 5000 Unit(s) SubCutaneous every 12 hours  lidocaine   4% Patch 1 Patch Transdermal daily  melatonin 3 milliGRAM(s) Oral at bedtime PRN  metoprolol tartrate 12.5 milliGRAM(s) Oral two times a day  ondansetron Injectable 4 milliGRAM(s) IV Push every 8 hours PRN      Objective:  Vital Signs Last 24 Hrs  T(C): 36.7 (17 Jan 2023 05:35), Max: 37.1 (16 Jan 2023 12:59)  T(F): 98 (17 Jan 2023 05:35), Max: 98.7 (16 Jan 2023 12:59)  HR: 71 (17 Jan 2023 05:35) (71 - 118)  BP: 136/81 (17 Jan 2023 05:35) (109/62 - 136/81)  BP(mean): --  RR: 16 (17 Jan 2023 05:35) (16 - 16)  SpO2: 91% (17 Jan 2023 05:35) (91% - 96%)    Parameters below as of 17 Jan 2023 05:35  Patient On (Oxygen Delivery Method): room air        T(C): 36.7 (01-17-23 @ 05:35), Max: 37.1 (01-15-23 @ 21:07)  T(C): 36.7 (01-17-23 @ 05:35), Max: 37.1 (01-15-23 @ 03:06)  T(C): 36.7 (01-17-23 @ 05:35), Max: 37.1 (01-15-23 @ 03:06)    PHYSICAL EXAM:  HEENT: NC atraumatic  Neck: supple  Respiratory: no accessory muscle use, breathing comfortably  Cardiovascular: distant  Gastrointestinal: normal appearing, nondistended  Extremities: no clubbing, no cyanosis,        LABS:                          10.1   13.39 )-----------( 285      ( 17 Jan 2023 07:26 )             31.9       WBC  13.39 01-17 @ 07:26  20.47 01-16 @ 06:25  26.99 01-15 @ 08:34  25.44 01-14 @ 19:00      01-17    135  |  107  |  33<H>  ----------------------------<  96  3.7   |  23  |  1.10    Ca    8.2<L>      17 Jan 2023 07:26  Mg     2.2     01-16    TPro  5.9<L>  /  Alb  2.1<L>  /  TBili  0.5  /  DBili  x   /  AST  31  /  ALT  25  /  AlkPhos  69  01-16      Creatinine, Serum: 1.10 mg/dL (01-17-23 @ 07:26)  Creatinine, Serum: 1.10 mg/dL (01-16-23 @ 06:25)  Creatinine, Serum: 1.50 mg/dL (01-15-23 @ 08:34)  Creatinine, Serum: 1.80 mg/dL (01-14-23 @ 19:00)                INFLAMMATORY MARKERS      MICROBIOLOGY:    Culture - Blood (01.14.23 @ 21:30)    -  Escherichia coli: Detec    Gram Stain:   Growth in aerobic bottle: Gram Negative Rods  Growth in anaerobic bottle: Gram Negative Rods    -  Amikacin: S <=16    -  Ampicillin: S <=8 These ampicillin results predict results for amoxicillin    -  Ampicillin/Sulbactam: S <=4/2 Enterobacter, Klebsiella aerogenes, Citrobacter, and Serratia may develop resistance during prolonged therapy (3-4 days)    -  Aztreonam: S <=4    -  Cefazolin: S <=2 Enterobacter, Klebsiella aerogenes, Citrobacter, and Serratia may develop resistance during prolonged therapy (3-4 days)    -  Cefepime: S <=2    -  Cefoxitin: S <=8    -  Ceftriaxone: S <=1 Enterobacter, Klebsiella aerogenes, Citrobacter, and Serratia may develop resistance during prolonged therapy    -  Ciprofloxacin: S <=0.25    -  Ertapenem: S <=0.5    -  Gentamicin: S <=2    -  Imipenem: S <=1    -  Levofloxacin: S <=0.5    -  Meropenem: S <=1    -  Piperacillin/Tazobactam: S <=8    -  Tobramycin: S <=2    -  Trimethoprim/Sulfamethoxazole: S <=0.5/9.5    Specimen Source: .Blood Blood-Peripheral    Organism: Blood Culture PCR    Organism: Escherichia coli    Culture Results:   Growth in aerobic and anaerobic bottles: Escherichia coli  ***Blood Panel PCR results on this specimen are available  approximately 3 hours after the Gram stain result.***  Gram stain, PCR, and/or culture results may not always  correspond due to difference in methodologies.  ************************************************************  This PCR assay was performed by multiplex PCR. This  Assay tests for 66 bacterial and resistance gene targets.  Please refer to the [facility] test directory  at [email or web address] for details.    Organism Identification: Blood Culture PCR  Escherichia coli    Method Type: PCR    Method Type: CHRISTIANO        RADIOLOGY & ADDITIONAL STUDIES:

## 2023-01-17 NOTE — DISCHARGE NOTE PROVIDER - NSDCMRMEDTOKEN_GEN_ALL_CORE_FT
acetaminophen 325 mg oral tablet: 2 tab(s) orally every 6 hours, As needed, Temp greater or equal to 38C (100.4F), Mild Pain (1 - 3), Moderate Pain (4 - 6)  amlodipine-valsartan 5 mg-160 mg oral tablet: 0.5 tab(s) orally once a day   acetaminophen 325 mg oral tablet: 2 tab(s) orally every 6 hours, As needed, Temp greater or equal to 38C (100.4F), Mild Pain (1 - 3), Moderate Pain (4 - 6)  amlodipine-valsartan 5 mg-160 mg oral tablet: 0.5 tab(s) orally once a day  amoxicillin 500 mg oral capsule: 2 cap(s) orally 2 times a day  last dose 1/23   acetaminophen 325 mg oral tablet: 2 tab(s) orally every 6 hours, As needed, Temp greater or equal to 38C (100.4F), Mild Pain (1 - 3), Moderate Pain (4 - 6)  amoxicillin 500 mg oral capsule: 2 cap(s) orally 2 times a day  last dose 1/23  lidocaine 4% topical film: Apply topically to affected area once a day  methyl salicylate-menthol 15%-10% topical cream: 1 application topically 4 times a day, As needed, lower back pain  metoprolol: 12.5 milligram(s) orally 2 times a day

## 2023-01-17 NOTE — PROGRESS NOTE ADULT - PROBLEM SELECTOR PLAN 3
Acute UTI with E.coli on Ucx  - CT abd/pelvis: No obstructive uropathy or urolithiasis. Asymmetrically enlarged right kidney with asymmetric right perinephric stranding.   UA Moderate Leukocyte esterase, 26-50 WBC   - D/c Zosyn. Start rocephin per ID  - ID recs to follow on timing of transition to PO  - ID Dr. Nolasco following, recs appreciated  - Trend temps, CBC

## 2023-01-17 NOTE — DIETITIAN INITIAL EVALUATION ADULT - ADD RECOMMEND
1. recommend MVI and Vit C 500mg BID 2. recommend liberalize to low Na diet 3. suggest add ensure plus HP BID

## 2023-01-17 NOTE — PROGRESS NOTE ADULT - SUBJECTIVE AND OBJECTIVE BOX
[facility] -- Chapin Blood Patel, Savella, Goodger  Office # [phone]    Follow Up:      Subjective/Observations:     REVIEW OF SYSTEMS: All other review of systems is negative unless indicated above  PAST MEDICAL & SURGICAL HISTORY:  HTN (hypertension)  Hypertension    MEDICATIONS  (STANDING):  cefTRIAXone   IVPB 1000 milliGRAM(s) IV Intermittent every 24 hours  heparin   Injectable 5000 Unit(s) SubCutaneous every 12 hours  lidocaine   4% Patch 1 Patch Transdermal daily  metoprolol tartrate 12.5 milliGRAM(s) Oral two times a day    MEDICATIONS  (PRN):  acetaminophen     Tablet .. 650 milliGRAM(s) Oral every 6 hours PRN Temp greater or equal to 38C (100.4F), Mild Pain (1 - 3)  aluminum hydroxide/magnesium hydroxide/simethicone Suspension 30 milliLiter(s) Oral every 4 hours PRN Dyspepsia  melatonin 3 milliGRAM(s) Oral at bedtime PRN Insomnia  methyl salicylate 15%/menthol 10% Topical Cream 1 Application(s) Topical four times a day PRN as per MD  ondansetron Injectable 4 milliGRAM(s) IV Push every 8 hours PRN Nausea and/or Vomiting    Allergies    Allergy Status Unknown  No Known Allergies    Intolerances    Vital Signs Last 24 Hrs  T(C): 36.7 (17 Jan 2023 05:35), Max: 36.8 (16 Jan 2023 21:35)  T(F): 98 (17 Jan 2023 05:35), Max: 98.2 (16 Jan 2023 21:35)  HR: 71 (17 Jan 2023 05:35) (71 - 101)  BP: 136/81 (17 Jan 2023 05:35) (109/62 - 136/81)  BP(mean): --  RR: 16 (17 Jan 2023 05:35) (16 - 16)  SpO2: 91% (17 Jan 2023 05:35) (91% - 96%)    Parameters below as of 17 Jan 2023 05:35  Patient On (Oxygen Delivery Method): room air      I&O's Summary    16 Jan 2023 07:01  -  17 Jan 2023 07:00  --------------------------------------------------------  IN: 0 mL / OUT: 500 mL / NET: -500 mL    PHYSICAL EXAM:  TELE: NSR  Constitutional: NAD, awake and alert, well-developed  HEENT: Moist Mucous Membranes, Anicteric  Pulmonary: Non-labored, breath sounds are clear bilaterally, No wheezing, rales or rhonchi  Cardiovascular: Regular, S1 and S2, No murmurs, rubs, gallops or clicks  Gastrointestinal: Bowel Sounds present, soft, nontender.   Lymph: No peripheral edema. No lymphadenopathy.  Skin: No visible rashes or ulcers.  Psych:  Mood & affect appropriate  LABS: All Labs Reviewed:                        10.1 13.39 )-----------( 285      ( 17 Jan 2023 07:26 )             31.9                         10.7   20.47 )-----------( 248      ( 16 Jan 2023 06:25 )             33.8                         10.1   26.99 )-----------( 247      ( 15 Ron 2023 08:34 )             30.7     17 Jan 2023 07:26    135    |  107    |  33     ----------------------------<  96     3.7     |  23     |  1.10   16 Jan 2023 06:25    137    |  109    |  32     ----------------------------<  87     4.3     |  20     |  1.10   15 Ron 2023 08:34    133    |  99     |  38     ----------------------------<  104    3.0     |  27     |  1.50     Ca    8.2        17 Jan 2023 07:26  Ca    8.0        16 Jan 2023 06:25  Ca    7.9        15 Ron 2023 08:34  Phos  2.6       15 Ron 2023 08:34  Mg     2.2       16 Jan 2023 06:25  Mg     2.1       15 Ron 2023 08:34    TPro  5.9    /  Alb  2.1    /  TBili  0.5    /  DBili  x      /  AST  31     /  ALT  25     /  AlkPhos  69     16 Jan 2023 06:25  TPro  6.2    /  Alb  2.3    /  TBili  0.8    /  DBili  x      /  AST  43     /  ALT  26     /  AlkPhos  67     15 Jan 2023 08:34  TPro  7.5    /  Alb  2.9    /  TBili  1.0    /  DBili  x      /  AST  56     /  ALT  32     /  AlkPhos  82     14 Jan 2023 19:00    ACC: [id number] EXAM:  XR CHEST AP OR PA 1V                          PROCEDURE DATE:  01/14/2023      INTERPRETATION:  Portable chest radiograph    CLINICAL INFORMATION: Polytrauma. Fall. Pain    TECHNIQUE:  Portable  AP chest radiograph.    COMPARISON: None. .    FINDINGS:  CATHETERS AND TUBES: None    PULMONARY: The visualized lungs are clear of airspace consolidations or   pleural effusions.   No pneumothorax.    HEART/VASCULAR:  The  heart is mildly enlarged in transverse diameter.     BONES: Visualized osseous structures are intact.    IMPRESSION:   No radiographic evidence of active chest disease.    --- End of Report ---  JORGE ACEVEDO MD; Attending Radiologist  This document has been electronically signed. Ron 15 2023  1:15PM    Ventricular Rate 82 BPM    Atrial Rate 82 BPM    P-R Interval 128 ms    QRS Duration 92 ms    Q-T Interval 386 ms    QTC Calculation(Bazett) 450 ms    P Axis 40 degrees    R Axis -26 degrees    T Axis 25 degrees    Diagnosis Line Sinus rhythm with premature supraventricular complexes  Otherwise normal ECG  When compared with ECG of 15-RON-2023 03:26,  No significant change was found  Confirmed by Lela Soto ([id number]) on 1/16/2023 9:09:57 AM    TTE pending    [facility] -- Chapin Blood Patel, Savella, Goodger  Office # [phone]    Follow Up:  Tachycardia    Subjective/Observations: Sitting on the chair, c/o lower back pain.  Denies any respiratory or cardiac discomfort.  On NC    REVIEW OF SYSTEMS: All other review of systems is negative unless indicated above  PAST MEDICAL & SURGICAL HISTORY:  HTN (hypertension)  Hypertension    MEDICATIONS  (STANDING):  cefTRIAXone   IVPB 1000 milliGRAM(s) IV Intermittent every 24 hours  heparin   Injectable 5000 Unit(s) SubCutaneous every 12 hours  lidocaine   4% Patch 1 Patch Transdermal daily  metoprolol tartrate 12.5 milliGRAM(s) Oral two times a day    MEDICATIONS  (PRN):  acetaminophen     Tablet .. 650 milliGRAM(s) Oral every 6 hours PRN Temp greater or equal to 38C (100.4F), Mild Pain (1 - 3)  aluminum hydroxide/magnesium hydroxide/simethicone Suspension 30 milliLiter(s) Oral every 4 hours PRN Dyspepsia  melatonin 3 milliGRAM(s) Oral at bedtime PRN Insomnia  methyl salicylate 15%/menthol 10% Topical Cream 1 Application(s) Topical four times a day PRN as per MD  ondansetron Injectable 4 milliGRAM(s) IV Push every 8 hours PRN Nausea and/or Vomiting    Allergies    Allergy Status Unknown  No Known Allergies    Intolerances    Vital Signs Last 24 Hrs  T(C): 36.7 (17 Jan 2023 05:35), Max: 36.8 (16 Jan 2023 21:35)  T(F): 98 (17 Jan 2023 05:35), Max: 98.2 (16 Jan 2023 21:35)  HR: 71 (17 Jan 2023 05:35) (71 - 101)  BP: 136/81 (17 Jan 2023 05:35) (109/62 - 136/81)  BP(mean): --  RR: 16 (17 Jan 2023 05:35) (16 - 16)  SpO2: 91% (17 Jan 2023 05:35) (91% - 96%)    Parameters below as of 17 Jan 2023 05:35  Patient On (Oxygen Delivery Method): room air      I&O's Summary    16 Jan 2023 07:01  -  17 Jan 2023 07:00  --------------------------------------------------------  IN: 0 mL / OUT: 500 mL / NET: -500 mL    PHYSICAL EXAM:  TELE: MIGUEL  Constitutional: NAD, awake and alert, well-developed  HEENT: Moist Mucous Membranes, Anicteric  Pulmonary: Non-labored, breath sounds are clear bilaterally, No wheezing, rales or rhonchi  Cardiovascular: Regular, S1 and S2, +murmurs, no 6rubs, gallops or clicks  Gastrointestinal: Bowel Sounds present, soft, nontender.   Lymph: No peripheral edema. No lymphadenopathy.  Skin: No visible rashes or ulcers.  Psych:  Mood & affect appropriate  LABS: All Labs Reviewed:                        10.1   13.39 )-----------( 285      ( 17 Jan 2023 07:26 )             31.9                         10.7   20.47 )-----------( 248      ( 16 Jan 2023 06:25 )             33.8                         10.1   26.99 )-----------( 247      ( 15 Ron 2023 08:34 )             30.7     17 Jan 2023 07:26    135    |  107    |  33     ----------------------------<  96     3.7     |  23     |  1.10   16 Jan 2023 06:25    137    |  109    |  32     ----------------------------<  87     4.3     |  20     |  1.10   15 Ron 2023 08:34    133    |  99     |  38     ----------------------------<  104    3.0     |  27     |  1.50     Ca    8.2        17 Jan 2023 07:26  Ca    8.0        16 Jan 2023 06:25  Ca    7.9        15 Ron 2023 08:34  Phos  2.6       15 Ron 2023 08:34  Mg     2.2       16 Jan 2023 06:25  Mg     2.1       15 Ron 2023 08:34    TPro  5.9    /  Alb  2.1    /  TBili  0.5    /  DBili  x      /  AST  31     /  ALT  25     /  AlkPhos  69     16 Jan 2023 06:25  TPro  6.2    /  Alb  2.3    /  TBili  0.8    /  DBili  x      /  AST  43     /  ALT  26     /  AlkPhos  67     15 Ron 2023 08:34  TPro  7.5    /  Alb  2.9    /  TBili  1.0    /  DBili  x      /  AST  56     /  ALT  32     /  AlkPhos  82     14 Jan 2023 19:00    ACC: [id number] EXAM:  XR CHEST AP OR PA 1V                          PROCEDURE DATE:  01/14/2023      INTERPRETATION:  Portable chest radiograph    CLINICAL INFORMATION: Polytrauma. Fall. Pain    TECHNIQUE:  Portable  AP chest radiograph.    COMPARISON: None. .    FINDINGS:  CATHETERS AND TUBES: None    PULMONARY: The visualized lungs are clear of airspace consolidations or   pleural effusions.   No pneumothorax.    HEART/VASCULAR:  The  heart is mildly enlarged in transverse diameter.     BONES: Visualized osseous structures are intact.    IMPRESSION:   No radiographic evidence of active chest disease.    --- End of Report ---  JORGE ACEVEDO MD; Attending Radiologist  This document has been electronically signed. Ron 15 2023  1:15PM    Ventricular Rate 82 BPM    Atrial Rate 82 BPM    P-R Interval 128 ms    QRS Duration 92 ms    Q-T Interval 386 ms    QTC Calculation(Bazett) 450 ms    P Axis 40 degrees    R Axis -26 degrees    T Axis 25 degrees    Diagnosis Line Sinus rhythm with premature supraventricular complexes  Otherwise normal ECG  When compared with ECG of 15-RON-2023 03:26,  No significant change was found  Confirmed by Lela Soto ([id number]) on 1/16/2023 9:09:57 AM    TTE pending    [facility] -- Chapin Blood Patel, Savella, Goodger  Office # [phone]    Follow Up:  Tachycardia    Subjective/Observations: Sitting on the chair, c/o lower back pain.  Denies any respiratory or cardiac discomfort.  On NC    REVIEW OF SYSTEMS: All other review of systems is negative unless indicated above  PAST MEDICAL & SURGICAL HISTORY:  HTN (hypertension)  Hypertension    MEDICATIONS  (STANDING):  cefTRIAXone   IVPB 1000 milliGRAM(s) IV Intermittent every 24 hours  heparin   Injectable 5000 Unit(s) SubCutaneous every 12 hours  lidocaine   4% Patch 1 Patch Transdermal daily  metoprolol tartrate 12.5 milliGRAM(s) Oral two times a day    MEDICATIONS  (PRN):  acetaminophen     Tablet .. 650 milliGRAM(s) Oral every 6 hours PRN Temp greater or equal to 38C (100.4F), Mild Pain (1 - 3)  aluminum hydroxide/magnesium hydroxide/simethicone Suspension 30 milliLiter(s) Oral every 4 hours PRN Dyspepsia  melatonin 3 milliGRAM(s) Oral at bedtime PRN Insomnia  methyl salicylate 15%/menthol 10% Topical Cream 1 Application(s) Topical four times a day PRN as per MD  ondansetron Injectable 4 milliGRAM(s) IV Push every 8 hours PRN Nausea and/or Vomiting    Allergies    Allergy Status Unknown  No Known Allergies    Intolerances    Vital Signs Last 24 Hrs  T(C): 36.7 (17 Jan 2023 05:35), Max: 36.8 (16 Jan 2023 21:35)  T(F): 98 (17 Jan 2023 05:35), Max: 98.2 (16 Jan 2023 21:35)  HR: 71 (17 Jan 2023 05:35) (71 - 101)  BP: 136/81 (17 Jan 2023 05:35) (109/62 - 136/81)  BP(mean): --  RR: 16 (17 Jan 2023 05:35) (16 - 16)  SpO2: 91% (17 Jan 2023 05:35) (91% - 96%)    Parameters below as of 17 Jan 2023 05:35  Patient On (Oxygen Delivery Method): room air    I&O's Summary    16 Jan 2023 07:01  -  17 Jan 2023 07:00  --------------------------------------------------------  IN: 0 mL / OUT: 500 mL / NET: -500 mL    PHYSICAL EXAM:  TELE: MIGUEL  Constitutional: NAD, awake and alert, well-developed  HEENT: Moist Mucous Membranes, Anicteric  Pulmonary: Non-labored, breath sounds are clear bilaterally, No wheezing, rales or rhonchi  Cardiovascular: Regular, S1 and S2, +murmurs, no 6rubs, gallops or clicks  Gastrointestinal: Bowel Sounds present, soft, nontender.   Lymph: No peripheral edema. No lymphadenopathy.  Skin: No visible rashes or ulcers.  Psych:  Mood & affect appropriate  LABS: All Labs Reviewed:                        10.1   13.39 )-----------( 285      ( 17 Jan 2023 07:26 )             31.9                         10.7   20.47 )-----------( 248      ( 16 Jan 2023 06:25 )             33.8                         10.1   26.99 )-----------( 247      ( 15 Ron 2023 08:34 )             30.7     17 Jan 2023 07:26    135    |  107    |  33     ----------------------------<  96     3.7     |  23     |  1.10   16 Jan 2023 06:25    137    |  109    |  32     ----------------------------<  87     4.3     |  20     |  1.10   15 Ron 2023 08:34    133    |  99     |  38     ----------------------------<  104    3.0     |  27     |  1.50     Ca    8.2        17 Jan 2023 07:26  Ca    8.0        16 Jan 2023 06:25  Ca    7.9        15 Ron 2023 08:34  Phos  2.6       15 Ron 2023 08:34  Mg     2.2       16 Jan 2023 06:25  Mg     2.1       15 Ron 2023 08:34    TPro  5.9    /  Alb  2.1    /  TBili  0.5    /  DBili  x      /  AST  31     /  ALT  25     /  AlkPhos  69     16 Jan 2023 06:25  TPro  6.2    /  Alb  2.3    /  TBili  0.8    /  DBili  x      /  AST  43     /  ALT  26     /  AlkPhos  67     15 Ron 2023 08:34  TPro  7.5    /  Alb  2.9    /  TBili  1.0    /  DBili  x      /  AST  56     /  ALT  32     /  AlkPhos  82     14 Jan 2023 19:00    ACC: [id number] EXAM:  XR CHEST AP OR PA 1V                          PROCEDURE DATE:  01/14/2023      INTERPRETATION:  Portable chest radiograph    CLINICAL INFORMATION: Polytrauma. Fall. Pain    TECHNIQUE:  Portable  AP chest radiograph.    COMPARISON: None. .    FINDINGS:  CATHETERS AND TUBES: None    PULMONARY: The visualized lungs are clear of airspace consolidations or   pleural effusions.   No pneumothorax.    HEART/VASCULAR:  The  heart is mildly enlarged in transverse diameter.     BONES: Visualized osseous structures are intact.    IMPRESSION:   No radiographic evidence of active chest disease.    --- End of Report ---  JORGE ACEVEDO MD; Attending Radiologist  This document has been electronically signed. Ron 15 2023  1:15PM    Ventricular Rate 82 BPM    Atrial Rate 82 BPM    P-R Interval 128 ms    QRS Duration 92 ms    Q-T Interval 386 ms    QTC Calculation(Bazett) 450 ms    P Axis 40 degrees    R Axis -26 degrees    T Axis 25 degrees    Diagnosis Line Sinus rhythm with premature supraventricular complexes  Otherwise normal ECG  When compared with ECG of 15-RON-2023 03:26,  No significant change was found  Confirmed by Lela Soto ([id number]) on 1/16/2023 9:09:57 AM    TTE pending

## 2023-01-17 NOTE — DIETITIAN INITIAL EVALUATION ADULT - ENTER TO (CAL/KG)
30
Has Your Skin Lesion Been Treated?: not been treated
Is This A New Presentation, Or A Follow-Up?: Growths

## 2023-01-17 NOTE — PROGRESS NOTE ADULT - ASSESSMENT
88yo female PMH Dementia htn admitted with Ecoli sepis with bacteremia sec UTI    Bacteremia with GNR- Ecoli by pcr.  Marked leukocytosis, LEIGH  CT w/ enlarged R kidney w/ perinephric stranding    RECOMMENDATIONS  sp Zosyn  change to Ceftriaxone 1/17-recs to follow on timing of transition to PO  -- BCx + E.coli  -- repeat BCx collected 1/16-NGTD  -- UCx E.coli  Trend temps and cbc  Asp precautions    Thank you for consulting us and involving us in the management of this most interesting and challenging case.  We will follow along in the care of this patient. Please call us at [phone] or text me directly on my cell# at [phone] with any concerns.

## 2023-01-17 NOTE — DISCHARGE NOTE PROVIDER - PROVIDER TOKENS
PROVIDER:[TOKEN:[[id number]],FOLLOWUP:[1 week]],PROVIDER:[TOKEN:[[id number]],FOLLOWUP:[1 week]] PROVIDER:[TOKEN:[[id number]],FOLLOWUP:[1 week]],PROVIDER:[TOKEN:[[id number]],FOLLOWUP:[1 week]],PROVIDER:[TOKEN:[[id number]],FOLLOWUP:[1 month]]

## 2023-01-17 NOTE — PROGRESS NOTE ADULT - PROBLEM SELECTOR PLAN 1
Severe sepsis with E. coli bacteremia and metabolic encephalopathy 2/2 E. coli UTI  - CT abd/pelvis: No obstructive uropathy or urolithiasis. Asymmetrically enlarged right kidney with asymmetric right perinephric stranding. Small b/l pleural effusions.  - Per ID, d/c Zosyn  - Start Rocephin  - Bcx 1/14 with E. coi  - Repeat Bcx 1/16 prelim NGTD, final read pending  - WBC downtrending 20 >13  - Trend temps, CBC  - ID Dr. Nolasco following, recs appreciated

## 2023-01-17 NOTE — PROGRESS NOTE ADULT - TIME BILLING
Pt seen + examined. Case discussed with resident. Agree with assessment and plan above (edited by me in detail):  Time spent: 55min. More than 50% of the visit was spent counseling the patient on medical condition and coordination of care

## 2023-01-17 NOTE — PROGRESS NOTE ADULT - PROBLEM SELECTOR PLAN 4
Tachycardia multifactorial in setting of dehydration pain and underlying infection, UTI, rhabdomyolysis  - Tele revealing Sr - ST, patient asymptomatic  - on metoprolol 12.5mg bid  - TTE 1/15 performed, f/u results   - Cardio consulted, recs appreciated Tachycardia multifactorial in setting of dehydration pain and underlying infection, UTI, rhabdomyolysis  - Tele revealing Sr - ST, patient asymptomatic  - on metoprolol 12.5mg bid  - TTE 1/15 performed, f/u results   - Cardio following, recs appreciated

## 2023-01-17 NOTE — DISCHARGE NOTE PROVIDER - NSDCCPCAREPLAN_GEN_ALL_CORE_FT
PRINCIPAL DISCHARGE DIAGNOSIS  Diagnosis: Severe sepsis  Assessment and Plan of Treatment:       SECONDARY DISCHARGE DIAGNOSES  Diagnosis: Tachycardia  Assessment and Plan of Treatment:     Diagnosis: LEIGH (acute kidney injury)  Assessment and Plan of Treatment:     Diagnosis: Rhabdomyolysis  Assessment and Plan of Treatment:     Diagnosis: Hypertension  Assessment and Plan of Treatment:     Diagnosis: Fall  Assessment and Plan of Treatment:      PRINCIPAL DISCHARGE DIAGNOSIS  Diagnosis: UTI (urinary tract infection), bacterial  Assessment and Plan of Treatment: You were diagnosed with a urinary tract infection in the hospital and found to have sepsis. You were treated with various antibiotics and your clinical course improved. Please continue to take Ampicillin 1 gram daily the last day of treatment being 1/23.   Return to the Emergency Department if:  -you experience fever with shaking chills, especially if there's also back pain   -bad-smelling, bloody, or discolored pee  -low back pain or belly pain (especially below the belly button)  -a fever that does not go away in 3 days  -repeated vomiting or concern for dehydration      SECONDARY DISCHARGE DIAGNOSES  Diagnosis: Fall  Assessment and Plan of Treatment: You presented to the Emergency Department after an unwitnessed fall and had hip pain that resolved. Take Tylenol as needed for pain managment. You will be going to subacute rehab to help you rebuild strength.    Diagnosis: Metabolic encephalopathy  Assessment and Plan of Treatment: You have altered mental status due to likely metabolic encephalopahy which is as problem in the brain which can make it harder to think and remember things clearly. Please follow up with Neurology outpatient for further management.

## 2023-01-17 NOTE — PROGRESS NOTE ADULT - SUBJECTIVE AND OBJECTIVE BOX
Patient is a 87y old  Female who presents with a chief complaint of UTI (17 Jan 2023 11:20)    INTERVAL HPI/OVERNIGHT EVENTS: Patient seen and examined at bedside. At 6pm yesterday evening, patient was tachycardic nonsustaining to the 140s. Patient was given lidocaine patch for L hip pain. Patient denies palpitations. Denies fevers, chills, headache, lightheadedness, chest pain, dyspnea, abdominal pain, n/v/d/c. Patient c/o R heel burning pain. Patient reports that she has intermittent R heel pain at home for which she uses [facility]. Pharmacy contacted for alternative to [facility] on formulary. Will give Bengay PRN for R heel pain. Per son Indra, patient not on home O2. Saturating on 2L O2 NC.     MEDICATIONS  (STANDING):  cefTRIAXone   IVPB 1000 milliGRAM(s) IV Intermittent every 24 hours  heparin   Injectable 5000 Unit(s) SubCutaneous every 12 hours  lidocaine   4% Patch 1 Patch Transdermal daily  metoprolol tartrate 12.5 milliGRAM(s) Oral two times a day    MEDICATIONS  (PRN):  acetaminophen     Tablet .. 650 milliGRAM(s) Oral every 6 hours PRN Temp greater or equal to 38C (100.4F), Mild Pain (1 - 3)  aluminum hydroxide/magnesium hydroxide/simethicone Suspension 30 milliLiter(s) Oral every 4 hours PRN Dyspepsia  melatonin 3 milliGRAM(s) Oral at bedtime PRN Insomnia  methyl salicylate 15%/menthol 10% Topical Cream 1 Application(s) Topical four times a day PRN as per MD  ondansetron Injectable 4 milliGRAM(s) IV Push every 8 hours PRN Nausea and/or Vomiting      Allergies    Allergy Status Unknown  No Known Allergies    Intolerances        REVIEW OF SYSTEMS:  CONSTITUTIONAL: No fever or chills  HEENT:  No headache, no sore throat  RESPIRATORY: No cough, wheezing, or shortness of breath  CARDIOVASCULAR: No chest pain, palpitations  GASTROINTESTINAL: No abd pain, nausea, vomiting, or diarrhea  GENITOURINARY: No dysuria, frequency, or hematuria  NEUROLOGICAL: no focal weakness or dizziness. +R heel burning pain  MUSCULOSKELETAL: no myalgias     Vital Signs Last 24 Hrs  T(C): 36.7 (17 Jan 2023 05:35), Max: 36.8 (16 Jan 2023 21:35)  T(F): 98 (17 Jan 2023 05:35), Max: 98.2 (16 Jan 2023 21:35)  HR: 71 (17 Jan 2023 05:35) (71 - 101)  BP: 136/81 (17 Jan 2023 05:35) (109/62 - 136/81)  BP(mean): --  RR: 16 (17 Jan 2023 05:35) (16 - 16)  SpO2: 91% (17 Jan 2023 05:35) (91% - 96%)    Parameters below as of 17 Jan 2023 05:35  Patient On (Oxygen Delivery Method): room air        PHYSICAL EXAM:  GENERAL: NAD, sitting up in bed comfortably  HEENT:  anicteric, moist mucous membranes  CHEST/LUNG:  CTA b/l, no rales, wheezes, or rhonchi  HEART:  RRR, S1, S2  ABDOMEN:  BS+, soft, nontender, nondistended  EXTREMITIES: no edema, cyanosis, or calf tenderness. No tenderness to palpation of R heel.   NERVOUS SYSTEM: answers questions and follows commands appropriately    LABS:                        10.1   13.39 )-----------( 285      ( 17 Jan 2023 07:26 )             31.9     CBC Full  -  ( 17 Jan 2023 07:26 )  WBC Count : 13.39 K/uL  Hemoglobin : 10.1 g/dL  Hematocrit : 31.9 %  Platelet Count - Automated : 285 K/uL  Mean Cell Volume : 93.5 fl  Mean Cell Hemoglobin : 29.6 pg  Mean Cell Hemoglobin Concentration : 31.7 gm/dL  Auto Neutrophil # : x  Auto Lymphocyte # : x  Auto Monocyte # : x  Auto Eosinophil # : x  Auto Basophil # : x  Auto Neutrophil % : x  Auto Lymphocyte % : x  Auto Monocyte % : x  Auto Eosinophil % : x  Auto Basophil % : x    17 Jan 2023 07:26    135    |  107    |  33     ----------------------------<  96     3.7     |  23     |  1.10     Ca    8.2        17 Jan 2023 07:26          CAPILLARY BLOOD GLUCOSE            Culture - Blood (collected 01-16-23 @ 06:28)  Source: .Blood Blood  Preliminary Report (01-17-23 @ 12:01):    No growth to date.    Culture - Blood (collected 01-16-23 @ 06:25)  Source: .Blood Blood  Preliminary Report (01-17-23 @ 12:01):    No growth to date.    Culture - Blood (collected 01-14-23 @ 21:30)  Source: .Blood Blood-Peripheral  Gram Stain (01-15-23 @ 11:49):    Growth in aerobic bottle: Gram Negative Rods    Growth in anaerobic bottle: Gram Negative Rods  Final Report (01-17-23 @ 07:59):    Growth in aerobic and anaerobic bottles: Escherichia coli    ***Blood Panel PCR results on this specimen are available    approximately 3 hours after the Gram stain result.***    Gram stain, PCR, and/or culture results may not always    correspond due to difference in methodologies.    ************************************************************    This PCR assay was performed by multiplex PCR. This    Assay tests for 66 bacterial and resistance gene targets.    Please refer to the [facility] test directory    at [email or web address] for details.  Organism: Blood Culture PCR  Escherichia coli (01-17-23 @ 07:59)  Organism: Escherichia coli (01-17-23 @ 07:59)      -  Amikacin: S <=16      -  Ampicillin: S <=8 These ampicillin results predict results for amoxicillin      -  Ampicillin/Sulbactam: S <=4/2 Enterobacter, Klebsiella aerogenes, Citrobacter, and Serratia may develop resistance during prolonged therapy (3-4 days)      -  Aztreonam: S <=4      -  Cefazolin: S <=2 Enterobacter, Klebsiella aerogenes, Citrobacter, and Serratia may develop resistance during prolonged therapy (3-4 days)      -  Cefepime: S <=2      -  Cefoxitin: S <=8      -  Ceftriaxone: S <=1 Enterobacter, Klebsiella aerogenes, Citrobacter, and Serratia may develop resistance during prolonged therapy      -  Ciprofloxacin: S <=0.25      -  Ertapenem: S <=0.5      -  Gentamicin: S <=2      -  Imipenem: S <=1      -  Levofloxacin: S <=0.5      -  Meropenem: S <=1      -  Piperacillin/Tazobactam: S <=8      -  Tobramycin: S <=2      -  Trimethoprim/Sulfamethoxazole: S <=0.5/9.5      Method Type: CHRISTIANO  Organism: Blood Culture PCR (01-17-23 @ 07:59)      -  Escherichia coli: Detec      Method Type: PCR    Culture - Blood (collected 01-14-23 @ 21:26)  Source: .Blood Blood-Peripheral  Gram Stain (01-15-23 @ 12:21):    Growth in anaerobic bottle: Gram Negative Rods    Growth in aerobic bottle: Gram Negative Rods  Final Report (01-17-23 @ 07:59):    Growth in aerobic and anaerobic bottles: Escherichia coli    See previous culture [id number]    Culture - Urine (collected 01-14-23 @ 20:55)  Source: Clean Catch Clean Catch (Midstream)  Preliminary Report (01-15-23 @ 22:03):    >100,000 CFU/ml Escherichia coli        RADIOLOGY & ADDITIONAL TESTS:    Personally reviewed.     Consultant(s) Notes Reviewed:  [x] YES  [ ] NO

## 2023-01-17 NOTE — CARE COORDINATION ASSESSMENT. - MET/SPOKE WITH
JOSELUIS met with pt at bedside and spoke with pt son Indra with pt permission T: [phone]/patient/son

## 2023-01-17 NOTE — PROGRESS NOTE ADULT - PROBLEM SELECTOR PLAN 6
Prerenal due sepsis   - Cr stable, 1.1   - Off IVF  - Monitor BMP Likely prerenal due to sepsis, LEIGH resolving  - Cr stable, 1.1   - Off IVF  - Monitor BMP multifactorial in setting of rhabdo and component of ATN in setting of sepsis, LEIGH resolving  - Cr stable, 1.1   - Off IVF  - Monitor BMP

## 2023-01-17 NOTE — DIETITIAN INITIAL EVALUATION ADULT - ORAL INTAKE PTA/DIET HISTORY
patient very [place]. per son at bedside patient eats two meals per day eats breakfast late oatmeal, waffle banana uses sugar , likes chicken on salad for second meal . family states will buy ensure at home now. no food allergies . low PO reported for some time . no weight loss noted  education deferred at this time

## 2023-01-17 NOTE — ADVANCED PRACTICE NURSE CONSULT - ASSESSMENT
HIRO Preciado assessed patient and Identified stage 2 pressure injury: Rn's are independent in the identification and management of stage 1 and stage 2 pressure injury

## 2023-01-17 NOTE — DIETITIAN INITIAL EVALUATION ADULT - PERTINENT LABORATORY DATA
01-17    135  |  107  |  33<H>  ----------------------------<  96  3.7   |  23  |  1.10    Ca    8.2<L>      17 Jan 2023 07:26  Mg     2.2     01-16    TPro  5.9<L>  /  Alb  2.1<L>  /  TBili  0.5  /  DBili  x   /  AST  31  /  ALT  25  /  AlkPhos  69  01-16

## 2023-01-17 NOTE — DIETITIAN INITIAL EVALUATION ADULT - OTHER INFO
Reason for Admission: UTI  Pt is an 86yo female with pmhx htn who presents 1 day after an unwitnessed   weight given at admit 125# stable weight per family  1/16 BM

## 2023-01-17 NOTE — DISCHARGE NOTE PROVIDER - HOSPITAL COURSE
HPI:  Pt is an 86yo female with pmhx htn who presents 1 day after an unwitnessed fall. At 730PM on day prior to admission son noticed on cameras that the pt was collapsed on the ground of the bathroom floor. He contacted the fire department who arrived to the scene where they all decided not to take the pt to the hospital. The pt was unable to walk or stand without assistance. The son stayed with her overnight. On the morning of admission the pt woke up and still was unable to walk or stand on her own. She was also complaining of right sided hip pain. She usually walks fine on her own and only occasionally used a cane outside her home. She was tremulous and was dizzy when she was able to stand up with assistance. At that point the family decided to take her to [facility]. They also reported episodes of confusion that would come and go recently. Her last fall was mechanical and was about 4-5 years ago which resulted in a hemorrhage. Her last UTI was several years ago. Of note pt's son believes that the pt had poor PO intake on day prior to admission.      ED Course:    Temp 98.5, , /70, RR 18, SPO2 97% RA  s/p Iv Zosyn x1, 1L NS, 0.5L NS  EKG: sinus tachycardia with premature atrial complexes, left axis deviation, inferior infarct  Labs significant for: 25.44 WBC, 22.76 neutrophil, 2.3 lactate, 130 Na, 3.2K, 42 BUN, 1.8 Cr, AST 56, 888 Creatinine Kinase, Moderate Leukocyte esterase, 26-50 WBC   Imaging:  CT HEAD: No acute intracranial hemorrhage, mass effect, or osseous   fracture.   CT CERVICAL SPINE: No acute cervical spine fracture or traumatic   malalignment. Multilevel cervical spondylosis.   (14 Jan 2023 22:43)      ---  HOSPITAL COURSE:     ---  CONSULTANTS:     ---  TIME SPENT:  I, the attending physician, was physically present for the key portions of the evaluation and management (E/M) service provided. The total amount of time spent reviewing the hospital notes, laboratory values, imaging findings, assessing/counseling the patient, discussing with consultant physicians, social work, nursing staff was -- minutes    ---  Primary care provider was made aware of plan for discharge:      [  ] NO     [  ] YES   HPI:  Pt is an 88yo female with pmhx htn who presents 1 day after an unwitnessed fall. At 730PM on day prior to admission son noticed on cameras that the pt was collapsed on the ground of the bathroom floor. He contacted the fire department who arrived to the scene where they all decided not to take the pt to the hospital. The pt was unable to walk or stand without assistance. The son stayed with her overnight. On the morning of admission the pt woke up and still was unable to walk or stand on her own. She was also complaining of right sided hip pain. She usually walks fine on her own and only occasionally used a cane outside her home. She was tremulous and was dizzy when she was able to stand up with assistance. At that point the family decided to take her to [facility]. They also reported episodes of confusion that would come and go recently. Her last fall was mechanical and was about 4-5 years ago which resulted in a hemorrhage. Her last UTI was several years ago. Of note pt's son believes that the pt had poor PO intake on day prior to admission.      ED Course:    Temp 98.5, , /70, RR 18, SPO2 97% RA  s/p Iv Zosyn x1, 1L NS, 0.5L NS  EKG: sinus tachycardia with premature atrial complexes, left axis deviation, inferior infarct  Labs significant for: 25.44 WBC, 22.76 neutrophil, 2.3 lactate, 130 Na, 3.2K, 42 BUN, 1.8 Cr, AST 56, 888 Creatinine Kinase, Moderate Leukocyte esterase, 26-50 WBC   Imaging:  CT HEAD: No acute intracranial hemorrhage, mass effect, or osseous   fracture.   CT CERVICAL SPINE: No acute cervical spine fracture or traumatic   malalignment. Multilevel cervical spondylosis.   (14 Jan 2023 22:43)      ---  HOSPITAL COURSE:   UA revealed infection, and blood and urine culture positive for ecoli. Patient admitted for severe sepsis 2/2 UTI, bacteremia. ID consulted, patient started on Zosyn, then transitioned to Ceftriaxone on 1/17. Repeat blood cultures revealed NGTD. CT abd/pelvis revealed no obstructive uropathy or urolithiasis. Asymmetrically enlarged right kidney with asymmetric right perinephric stranding. Pt also with elevated lactate (2.3), rhabdomyolysis () and LEIGH, all which resolved following IV fluids. Cardio consulted for tachycardia, PAC's and SVT. Recommended TTE however patient declined.     **updated 1/17  ---  CONSULTANTS:   ID: Gaurav  Cardio: [facility]  ---  TIME SPENT:  I, the attending physician, was physically present for the key portions of the evaluation and management (E/M) service provided. The total amount of time spent reviewing the hospital notes, laboratory values, imaging findings, assessing/counseling the patient, discussing with consultant physicians, social work, nursing staff was -- minutes    ---  Primary care provider was made aware of plan for discharge:      [  ] NO     [  ] YES   HPI:  Pt is an 88yo female with pmhx htn who presents 1 day after an unwitnessed fall. At 730PM on day prior to admission son noticed on cameras that the pt was collapsed on the ground of the bathroom floor. He contacted the fire department who arrived to the scene where they all decided not to take the pt to the hospital. The pt was unable to walk or stand without assistance. The son stayed with her overnight. On the morning of admission the pt woke up and still was unable to walk or stand on her own. She was also complaining of right sided hip pain. She usually walks fine on her own and only occasionally used a cane outside her home. She was tremulous and was dizzy when she was able to stand up with assistance. At that point the family decided to take her to [facility]. They also reported episodes of confusion that would come and go recently. Her last fall was mechanical and was about 4-5 years ago which resulted in a hemorrhage. Her last UTI was several years ago. Of note pt's son believes that the pt had poor PO intake on day prior to admission.      ED Course:    Temp 98.5, , /70, RR 18, SPO2 97% RA  s/p Iv Zosyn x1, 1L NS, 0.5L NS  EKG: sinus tachycardia with premature atrial complexes, left axis deviation, inferior infarct  Labs significant for: 25.44 WBC, 22.76 neutrophil, 2.3 lactate, 130 Na, 3.2K, 42 BUN, 1.8 Cr, AST 56, 888 Creatinine Kinase, Moderate Leukocyte esterase, 26-50 WBC   Imaging:  CT HEAD: No acute intracranial hemorrhage, mass effect, or osseous   fracture.   CT CERVICAL SPINE: No acute cervical spine fracture or traumatic   malalignment. Multilevel cervical spondylosis.   (14 Jan 2023 22:43)      ---  HOSPITAL COURSE:   UA revealed infection, and blood and urine culture positive for ecoli. Patient admitted for severe sepsis 2/2 UTI, bacteremia. ID consulted, patient started on Zosyn, then transitioned to Ceftriaxone on 1/17. Repeat blood cultures revealed NGTD. CT abd/pelvis revealed no obstructive uropathy or urolithiasis. Asymmetrically enlarged right kidney with asymmetric right perinephric stranding. Pt also with elevated lactate (2.3), rhabdomyolysis () and LEIGH, all which resolved following IV fluids. Cardio consulted for tachycardia, PAC's and SVT. Recommended TTE however patient declined.   Patient's son reports that the patient's mental function has been declining. Dr. Chun Bhatia was consulted to evalute patient.   ---  CONSULTANTS:   ID: Gaurav  Cardio: [facility]  ---  TIME SPENT:  I, the attending physician, was physically present for the key portions of the evaluation and management (E/M) service provided. The total amount of time spent reviewing the hospital notes, laboratory values, imaging findings, assessing/counseling the patient, discussing with consultant physicians, social work, nursing staff was -- minutes    ---  Primary care provider was made aware of plan for discharge:      [  ] NO     [  ] YES   HPI:  Pt is an 88yo female with pmhx htn who presents 1 day after an unwitnessed fall. At 730PM on day prior to admission son noticed on cameras that the pt was collapsed on the ground of the bathroom floor. He contacted the fire department who arrived to the scene where they all decided not to take the pt to the hospital. The pt was unable to walk or stand without assistance. The son stayed with her overnight. On the morning of admission the pt woke up and still was unable to walk or stand on her own. She was also complaining of right sided hip pain. She usually walks fine on her own and only occasionally used a cane outside her home. She was tremulous and was dizzy when she was able to stand up with assistance. At that point the family decided to take her to [facility]. They also reported episodes of confusion that would come and go recently. Her last fall was mechanical and was about 4-5 years ago which resulted in a hemorrhage. Her last UTI was several years ago. Of note pt's son believes that the pt had poor PO intake on day prior to admission.      ED Course:    Temp 98.5, , /70, RR 18, SPO2 97% RA  s/p Iv Zosyn x1, 1L NS, 0.5L NS  EKG: sinus tachycardia with premature atrial complexes, left axis deviation, inferior infarct  Labs significant for: 25.44 WBC, 22.76 neutrophil, 2.3 lactate, 130 Na, 3.2K, 42 BUN, 1.8 Cr, AST 56, 888 Creatinine Kinase, Moderate Leukocyte esterase, 26-50 WBC   Imaging:  CT HEAD: No acute intracranial hemorrhage, mass effect, or osseous   fracture.   CT CERVICAL SPINE: No acute cervical spine fracture or traumatic   malalignment. Multilevel cervical spondylosis.   (14 Jan 2023 22:43)      ---  HOSPITAL COURSE:   UA revealed infection, and blood and urine culture positive for ecoli. Patient admitted for severe sepsis 2/2 UTI, bacteremia. ID consulted, patient started on Zosyn, then transitioned to Ceftriaxone on 1/17. Repeat blood cultures revealed NGTD. CT abd/pelvis revealed no obstructive uropathy or urolithiasis. Asymmetrically enlarged right kidney with asymmetric right perinephric stranding. Pt also with elevated lactate (2.3), rhabdomyolysis () and LEIGH, all which resolved following IV fluids. Cardio consulted for tachycardia, PAC's and SVT. Recommended TTE however patient declined.   Patient's son reports that the patient's mental function has been declining. Dr. Mccollum Neuro was consulted to evalute patient- believed   the mental status is likely due to metabolic encephalopathy. An MRI of the head and lumbar spine was ordered.   Patients clinical course approved and she is stable for discharge.     CONSULTANTS:   Neuro: Dr. Mccollum  ID: Gaurav  Cardio: [facility]  ---  TIME SPENT:  I, the attending physician, was physically present for the key portions of the evaluation and management (E/M) service provided. The total amount of time spent reviewing the hospital notes, laboratory values, imaging findings, assessing/counseling the patient, discussing with consultant physicians, social work, nursing staff was -- minutes    ---  Primary care provider was made aware of plan for discharge:      [  ] NO     [  ] YES   HPI:  Pt is an 86yo female with pmhx htn who presents 1 day after an unwitnessed fall. At 730PM on day prior to admission son noticed on cameras that the pt was collapsed on the ground of the bathroom floor. He contacted the fire department who arrived to the scene where they all decided not to take the pt to the hospital. The pt was unable to walk or stand without assistance. The son stayed with her overnight. On the morning of admission the pt woke up and still was unable to walk or stand on her own. She was also complaining of right sided hip pain. She usually walks fine on her own and only occasionally used a cane outside her home. She was tremulous and was dizzy when she was able to stand up with assistance. At that point the family decided to take her to [facility]. They also reported episodes of confusion that would come and go recently. Her last fall was mechanical and was about 4-5 years ago which resulted in a hemorrhage. Her last UTI was several years ago. Of note pt's son believes that the pt had poor PO intake on day prior to admission.      ---  HOSPITAL COURSE:   UA revealed infection, and blood and urine culture positive for ecoli. Patient admitted for severe sepsis 2/2 UTI, bacteremia. ID consulted, patient started on Zosyn, then transitioned to Ceftriaxone on 1/17. Repeat blood cultures revealed NGTD. CT abd/pelvis revealed no obstructive uropathy or urolithiasis. Asymmetrically enlarged right kidney with asymmetric right perinephric stranding. Pt also with elevated lactate (2.3), rhabdomyolysis () and LEIGH, all which resolved following IV fluids. Cardio consulted for tachycardia, PAC's and SVT. Recommended TTE however patient declined.   Patient's son reports that the patient's mental function has been declining. Dr. Mccollum Neuro was consulted to evalute patient- believed the mental status is likely due to metabolic encephalopathy. An MRI of the head and lumbar spine was ordered.     CONSULTANTS:   Neuro: Dr. Mccollum  ID: Gaurav  Cardio: [facility] HPI:  Pt is an 86yo female with pmhx htn who presents 1 day after an unwitnessed fall. At 730PM on day prior to admission son noticed on cameras that the pt was collapsed on the ground of the bathroom floor. He contacted the fire department who arrived to the scene where they all decided not to take the pt to the hospital. The pt was unable to walk or stand without assistance. The son stayed with her overnight. On the morning of admission the pt woke up and still was unable to walk or stand on her own. She was also complaining of right sided hip pain. She usually walks fine on her own and only occasionally used a cane outside her home. She was tremulous and was dizzy when she was able to stand up with assistance. At that point the family decided to take her to [facility]. They also reported episodes of confusion that would come and go recently. Her last fall was mechanical and was about 4-5 years ago which resulted in a hemorrhage. Her last UTI was several years ago. Of note pt's son believes that the pt had poor PO intake on day prior to admission.      ---  HOSPITAL COURSE:   UA revealed infection, and blood and urine culture positive for ecoli. Patient admitted for severe sepsis 2/2 UTI, bacteremia. ID consulted, patient started on Zosyn, then transitioned to Ceftriaxone on 1/17. Repeat blood cultures revealed NGTD. CT abd/pelvis revealed no obstructive uropathy or urolithiasis. Asymmetrically enlarged right kidney with asymmetric right perinephric stranding. Pt also with elevated lactate (2.3), rhabdomyolysis () and LEIGH, all which resolved following IV fluids. Cardio consulted for tachycardia, PAC's and SVT. Recommended TTE however patient declined.   Patient's son reports that the patient's mental function has been declining. Dr. Mccollum Neuro was consulted to evalute patient- believed the mental status is likely due to metabolic encephalopathy. 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 Spoke w/ son on day of dc.     CONSULTANTS:   Neuro: Dr. Mccollum  ID: Gaurav  Cardio: [facility] HPI:  Pt is an 86yo female with pmhx htn who presents 1 day after an unwitnessed fall. At 730PM on day prior to admission son noticed on cameras that the pt was collapsed on the ground of the bathroom floor. He contacted the fire department who arrived to the scene where they all decided not to take the pt to the hospital. The pt was unable to walk or stand without assistance. The son stayed with her overnight. On the morning of admission the pt woke up and still was unable to walk or stand on her own. She was also complaining of right sided hip pain. She usually walks fine on her own and only occasionally used a cane outside her home. She was tremulous and was dizzy when she was able to stand up with assistance. At that point the family decided to take her to [facility]. They also reported episodes of confusion that would come and go recently. Her last fall was mechanical and was about 4-5 years ago which resulted in a hemorrhage. Her last UTI was several years ago. Of note pt's son believes that the pt had poor PO intake on day prior to admission.      ---  HOSPITAL COURSE:   UA revealed infection, and blood and urine culture positive for ecoli. Patient admitted for severe sepsis 2/2 UTI, bacteremia. ID consulted, patient started on Zosyn, then transitioned to Ceftriaxone on 1/17. Repeat blood cultures revealed NGTD. CT abd/pelvis revealed no obstructive uropathy or urolithiasis. Asymmetrically enlarged right kidney with asymmetric right perinephric stranding. Pt also with elevated lactate (2.3), rhabdomyolysis () and LEIGH, all which resolved following IV fluids. Cardio consulted for tachycardia, PAC's and SVT. Recommended TTE however patient declined.   Patient's son reports that the patient's mental function has been declining. Dr. Mccollum Neuro was consulted to evalute patient- believed the mental status is likely due to metabolic encephalopathy. 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 Spoke w/ son on day of dc.     CONSULTANTS:   Neuro: Dr. Mccollum  ID: Gaurav  Cardio: [facility]     ---  T(C): 36.6 (01-20-23 @ 12:51), Max: 37.1 (01-20-23 @ 05:30)  HR: 88 (01-20-23 @ 12:51) (86 - 93)  BP: 147/87 (01-20-23 @ 12:51) (143/81 - 167/82)  RR: 18 (01-20-23 @ 12:51) (17 - 18)  SpO2: 94% (01-20-23 @ 12:51) (94% - 97%)    PHYSICAL EXAM:  Constitutional: NAD, awake and alert, well-developed  HEENT: Moist Mucous Membranes, Anicteric  Pulmonary: Non-labored, breath sounds are clear bilaterally, No wheezing, crackles or rhonchi  Cardiovascular: Regular, S1 and S2 nl, murmur   Gastrointestinal: Bowel Sounds present, soft, nontender.   Lymph: No lymphadenopathy. No peripheral edema.  Skin: No visible rashes or ulcers.  Psych: + short term memory loss, +confabulation

## 2023-01-17 NOTE — DISCHARGE NOTE PROVIDER - CARE PROVIDER_API CALL
Bre Cadena (DO)  Family Medicine  [address] [place] [address]  Phone: [phone]  Fax: [phone]  Follow Up Time: 1 week    Vinicio Florentino  CARDIOLOGY  [address] [place] [address]  Phone: [phone]  Fax: [phone]  Follow Up Time: 1 week   Bre Cadena (DO)  Family Medicine  [address] [place] [address]  Phone: [phone]  Fax: [phone]  Follow Up Time: 1 week    Vinicio Florentino  CARDIOLOGY  [address] [place] [address]  Phone: [phone]  Fax: [phone]  Follow Up Time: 1 week    Kiana Mccollum  NEUROLOGY  [address] [place] [address]  Phone: [phone]  Fax: [phone]  Follow Up Time: 1 month

## 2023-01-17 NOTE — PROGRESS NOTE ADULT - ASSESSMENT
Sierra Sales DNP, NP-C  Cardiology   Spectra #[id number]      88yo female with pmhx htn who presents 1 day after an unwitnessed fall, pt remembers fall and denies LOC.   Cardiology called for tachycardia.  Pt currently c/o back pain, otherwise feels well.  She was found to have UTI    s/p Fall/Tachycardia  - Tele revealing Sr - ST.  Overnight, HR was up to 120 and briefly at 140  - Tachycardia multifactorial in setting of dehydration pain and underlying infection UTI rhabdo CK trending down   - Continue BB.  Hold home anti-HTN meds to allow for BB administration  - Pain control per Primary  - Follow up TTE    - Monitor and replete electrolytes. Keep K>4.0 and Mg>2.0.  - Will continue to follow    Sierra Sales DNP, NP-C  Cardiology   Spectra #[phone]

## 2023-01-17 NOTE — DIETITIAN INITIAL EVALUATION ADULT - CALCULATED FROM (G/KG)
Baldemar Saeed)  Pediatrics  [address] [place] [address]  Phone: [phone]  Fax: [phone]  Follow Up Time: 67.92

## 2023-01-17 NOTE — CARE COORDINATION ASSESSMENT. - NSDCPLANSERVICES_GEN_ALL_CORE
SW spoke with pt at bedside and called pt son Indra with pt permission T: [phone] to conduct initial assessment and explain SW roles with good understanding. Pt reported that she lives in private home alone and has no TAVON. Pt stated that she ambulates with a cane and does not have any home 02. PT stating [facility]. Pt stated that she has not been to [facility] in the past. Pt son Indra in agreement with [facility] as DC plan. JANETTE requested. Lists given, choices to be obtained. SW to f/u regarding DC plan and remain available for any needs./Subacute Rehabilitation

## 2023-01-17 NOTE — CARE COORDINATION ASSESSMENT. - NSCAREPROVIDERS_GEN_ALL_CORE_FT
CARE PROVIDERS:  Accepting Physician: Babar Hayward  Administration: Jb Tucker  Administration: Yasir Dutton  Admitting: Babar Hayward  Attending: Babar Hayward  Cardiology Technician: Meche Teran  Case Management: Iaris Schuler  Consultant: Blayne Goodman  Consultant: Tana Cage  Consultant: Shorty Nolasco  Consultant: Aline Middleton  Consultant: Lela Soto  Consultant: Sierra Sales  Consultant: Arlyn Lodr  Consultant: Esther Philip  Consultant: Vinicio Doshi  Consultant: Gonzales Carlos  Consultant: Shorty Royal  Covering Team: Gabbie Haji  ED ACP: Dennis Turk  ED Attending: Davey Gupta  ED Nurse: Doroteo Davalos  HIM/Billing & Coding: Lima Rodrigues  HIM/Billing & Coding: Samaria Grant  Nurse: Amelia, Maya  Nurse: Ilana George  Ordered: ADM, User  Ordered: Physician, Ordering  PCA/Nursing Assistant: Lila Collins  PCA/Nursing Assistant: Karishma Ware  Primary Team: Chaim Diallo  Primary Team: Neal Corcoran  Primary Team: Ana Rosa Aragon  Primary Team: Jeanie Murry  Primary Team: Shanon Watson  Primary Team: Andrews Ryan  Primary Team: Davey Luz  Primary Team: Latoya Williamson  Registered Dietitian: Vania Feliciano  Registered Dietitian: Tamra Mejía  Respiratory Therapy: Ramona Jack  : Tiffanie Byrne

## 2023-01-18 LAB
-  AMIKACIN: SIGNIFICANT CHANGE UP
-  AMOXICILLIN/CLAVULANIC ACID: SIGNIFICANT CHANGE UP
-  AMPICILLIN/SULBACTAM: SIGNIFICANT CHANGE UP
-  AMPICILLIN: SIGNIFICANT CHANGE UP
-  AZTREONAM: SIGNIFICANT CHANGE UP
-  CEFAZOLIN: SIGNIFICANT CHANGE UP
-  CEFEPIME: SIGNIFICANT CHANGE UP
-  CEFOXITIN: SIGNIFICANT CHANGE UP
-  CEFTRIAXONE: SIGNIFICANT CHANGE UP
-  CEFUROXIME: SIGNIFICANT CHANGE UP
-  CIPROFLOXACIN: SIGNIFICANT CHANGE UP
-  ERTAPENEM: SIGNIFICANT CHANGE UP
-  GENTAMICIN: SIGNIFICANT CHANGE UP
-  IMIPENEM: SIGNIFICANT CHANGE UP
-  LEVOFLOXACIN: SIGNIFICANT CHANGE UP
-  MEROPENEM: SIGNIFICANT CHANGE UP
-  NITROFURANTOIN: SIGNIFICANT CHANGE UP
-  PIPERACILLIN/TAZOBACTAM: SIGNIFICANT CHANGE UP
-  TOBRAMYCIN: SIGNIFICANT CHANGE UP
-  TRIMETHOPRIM/SULFAMETHOXAZOLE: SIGNIFICANT CHANGE UP
ALBUMIN SERPL ELPH-MCNC: 2.2 G/DL — LOW (ref 3.3–5)
ALP SERPL-CCNC: 61 U/L — SIGNIFICANT CHANGE UP (ref 40–120)
ALT FLD-CCNC: 23 U/L — SIGNIFICANT CHANGE UP (ref 12–78)
ANION GAP SERPL CALC-SCNC: 7 MMOL/L — SIGNIFICANT CHANGE UP (ref 5–17)
AST SERPL-CCNC: 23 U/L — SIGNIFICANT CHANGE UP (ref 15–37)
BASOPHILS # BLD AUTO: 0 K/UL — SIGNIFICANT CHANGE UP (ref 0–0.2)
BASOPHILS NFR BLD AUTO: 0 % — SIGNIFICANT CHANGE UP (ref 0–2)
BILIRUB SERPL-MCNC: 0.4 MG/DL — SIGNIFICANT CHANGE UP (ref 0.2–1.2)
BUN SERPL-MCNC: 24 MG/DL — HIGH (ref 7–23)
CALCIUM SERPL-MCNC: 8.3 MG/DL — LOW (ref 8.5–10.1)
CHLORIDE SERPL-SCNC: 107 MMOL/L — SIGNIFICANT CHANGE UP (ref 96–108)
CO2 SERPL-SCNC: 25 MMOL/L — SIGNIFICANT CHANGE UP (ref 22–31)
CREAT SERPL-MCNC: 0.92 MG/DL — SIGNIFICANT CHANGE UP (ref 0.5–1.3)
CULTURE RESULTS: SIGNIFICANT CHANGE UP
EGFR: 61 ML/MIN/1.73M2 — SIGNIFICANT CHANGE UP
EOSINOPHIL # BLD AUTO: 0.1 K/UL — SIGNIFICANT CHANGE UP (ref 0–0.5)
EOSINOPHIL NFR BLD AUTO: 1 % — SIGNIFICANT CHANGE UP (ref 0–6)
GLUCOSE SERPL-MCNC: 87 MG/DL — SIGNIFICANT CHANGE UP (ref 70–99)
HCT VFR BLD CALC: 32.3 % — LOW (ref 34.5–45)
HGB BLD-MCNC: 10.2 G/DL — LOW (ref 11.5–15.5)
LYMPHOCYTES # BLD AUTO: 1.13 K/UL — SIGNIFICANT CHANGE UP (ref 1–3.3)
LYMPHOCYTES # BLD AUTO: 11 % — LOW (ref 13–44)
MAGNESIUM SERPL-MCNC: 2.3 MG/DL — SIGNIFICANT CHANGE UP (ref 1.6–2.6)
MCHC RBC-ENTMCNC: 29.5 PG — SIGNIFICANT CHANGE UP (ref 27–34)
MCHC RBC-ENTMCNC: 31.6 GM/DL — LOW (ref 32–36)
MCV RBC AUTO: 93.4 FL — SIGNIFICANT CHANGE UP (ref 80–100)
METHOD TYPE: SIGNIFICANT CHANGE UP
MONOCYTES # BLD AUTO: 0.92 K/UL — HIGH (ref 0–0.9)
MONOCYTES NFR BLD AUTO: 9 % — SIGNIFICANT CHANGE UP (ref 2–14)
NEUTROPHILS # BLD AUTO: 7.79 K/UL — HIGH (ref 1.8–7.4)
NEUTROPHILS NFR BLD AUTO: 68 % — SIGNIFICANT CHANGE UP (ref 43–77)
NRBC # BLD: SIGNIFICANT CHANGE UP /100 WBCS (ref 0–0)
ORGANISM # SPEC MICROSCOPIC CNT: SIGNIFICANT CHANGE UP
ORGANISM # SPEC MICROSCOPIC CNT: SIGNIFICANT CHANGE UP
PHOSPHATE SERPL-MCNC: 2.2 MG/DL — LOW (ref 2.5–4.5)
PLATELET # BLD AUTO: 285 K/UL — SIGNIFICANT CHANGE UP (ref 150–400)
POTASSIUM SERPL-MCNC: 3.5 MMOL/L — SIGNIFICANT CHANGE UP (ref 3.5–5.3)
POTASSIUM SERPL-SCNC: 3.5 MMOL/L — SIGNIFICANT CHANGE UP (ref 3.5–5.3)
PROT SERPL-MCNC: 6.2 G/DL — SIGNIFICANT CHANGE UP (ref 6–8.3)
RBC # BLD: 3.46 M/UL — LOW (ref 3.8–5.2)
RBC # FLD: 14.8 % — HIGH (ref 10.3–14.5)
SARS-COV-2 RNA SPEC QL NAA+PROBE: SIGNIFICANT CHANGE UP
SODIUM SERPL-SCNC: 139 MMOL/L — SIGNIFICANT CHANGE UP (ref 135–145)
SPECIMEN SOURCE: SIGNIFICANT CHANGE UP
WBC # BLD: 10.25 K/UL — SIGNIFICANT CHANGE UP (ref 3.8–10.5)
WBC # FLD AUTO: 10.25 K/UL — SIGNIFICANT CHANGE UP (ref 3.8–10.5)

## 2023-01-18 PROCEDURE — 99233 SBSQ HOSP IP/OBS HIGH 50: CPT | Mod: GC

## 2023-01-18 PROCEDURE — 99232 SBSQ HOSP IP/OBS MODERATE 35: CPT

## 2023-01-18 RX ORDER — SODIUM,POTASSIUM PHOSPHATES 278-250MG
1 POWDER IN PACKET (EA) ORAL
Refills: 0 | Status: DISCONTINUED | OUTPATIENT
Start: 2023-01-18 | End: 2023-01-20

## 2023-01-18 RX ORDER — AMOXICILLIN 250 MG/5ML
1000 SUSPENSION, RECONSTITUTED, ORAL (ML) ORAL
Refills: 0 | Status: DISCONTINUED | OUTPATIENT
Start: 2023-01-19 | End: 2023-01-20

## 2023-01-18 RX ADMIN — Medication 1 PACKET(S): at 19:20

## 2023-01-18 RX ADMIN — HEPARIN SODIUM 5000 UNIT(S): 5000 INJECTION INTRAVENOUS; SUBCUTANEOUS at 06:10

## 2023-01-18 RX ADMIN — CEFTRIAXONE 100 MILLIGRAM(S): 500 INJECTION, POWDER, FOR SOLUTION INTRAMUSCULAR; INTRAVENOUS at 12:43

## 2023-01-18 RX ADMIN — HEPARIN SODIUM 5000 UNIT(S): 5000 INJECTION INTRAVENOUS; SUBCUTANEOUS at 17:43

## 2023-01-18 RX ADMIN — Medication 650 MILLIGRAM(S): at 14:29

## 2023-01-18 RX ADMIN — LIDOCAINE 1 PATCH: 4 CREAM TOPICAL at 13:00

## 2023-01-18 RX ADMIN — Medication 12.5 MILLIGRAM(S): at 17:43

## 2023-01-18 RX ADMIN — Medication 12.5 MILLIGRAM(S): at 06:10

## 2023-01-18 RX ADMIN — Medication 650 MILLIGRAM(S): at 13:56

## 2023-01-18 NOTE — PROGRESS NOTE ADULT - SUBJECTIVE AND OBJECTIVE BOX
Patient is a 87y old  Female who presents with a chief complaint of UTI (18 Jan 2023 10:03)      INTERVAL HPI/OVERNIGHT EVENTS: Patient seen and examined at bedside. No overnight events occurred. Patient complains of right heel discomfort and wants lotion rubbed on her heel. She otherwise has no complaints. No hip pain, dysuria, HA, fever, chills, cp, palpitations, abdominal pain n/v/d/c.   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 Son reports that her mental status is worse than normal. Dr. Mccollum consulted today.     MEDICATIONS  (STANDING):  cefTRIAXone   IVPB 1000 milliGRAM(s) IV Intermittent every 24 hours  heparin   Injectable 5000 Unit(s) SubCutaneous every 12 hours  lidocaine   4% Patch 1 Patch Transdermal daily  metoprolol tartrate 12.5 milliGRAM(s) Oral two times a day    MEDICATIONS  (PRN):  acetaminophen     Tablet .. 650 milliGRAM(s) Oral every 6 hours PRN Temp greater or equal to 38C (100.4F), Mild Pain (1 - 3)  aluminum hydroxide/magnesium hydroxide/simethicone Suspension 30 milliLiter(s) Oral every 4 hours PRN Dyspepsia  melatonin 3 milliGRAM(s) Oral at bedtime PRN Insomnia  methyl salicylate 15%/menthol 10% Topical Cream 1 Application(s) Topical four times a day PRN as per MD  methyl salicylate 15%/menthol 10% Topical Cream 1 Application(s) Topical four times a day PRN lower back pain  ondansetron Injectable 4 milliGRAM(s) IV Push every 8 hours PRN Nausea and/or Vomiting      Allergies    Allergy Status Unknown  No Known Allergies    Intolerances        REVIEW OF SYSTEMS:  CONSTITUTIONAL: No fever or chills  HEENT:  No headache, no sore throat  RESPIRATORY: No cough, wheezing, or shortness of breath  CARDIOVASCULAR: No chest pain, palpitations  GASTROINTESTINAL: No abd pain, nausea, vomiting, or diarrhea  GENITOURINARY: No dysuria, frequency, or hematuria  NEUROLOGICAL: no focal weakness or dizziness  MUSCULOSKELETAL: + right heel pain     Vital Signs Last 24 Hrs  T(C): 36.3 (18 Jan 2023 11:51), Max: 36.8 (18 Jan 2023 06:17)  T(F): 97.3 (18 Jan 2023 11:51), Max: 98.2 (18 Jan 2023 06:17)  HR: 80 (18 Jan 2023 11:51) (77 - 82)  BP: 153/84 (18 Jan 2023 11:51) (129/79 - 159/93)  BP(mean): --  RR: 16 (18 Jan 2023 11:51) (16 - 16)  SpO2: 98% (18 Jan 2023 11:51) (94% - 100%)    Parameters below as of 18 Jan 2023 11:51  Patient On (Oxygen Delivery Method): room air        PHYSICAL EXAM:  Constitutional: NAD, awake and alert, well-developed  HEENT: Moist Mucous Membranes, Anicteric  Pulmonary: Non-labored, breath sounds are clear bilaterally, No wheezing, crackles or rhonchi  Cardiovascular: Regular, S1 and S2 nl, murmur   Gastrointestinal: Bowel Sounds present, soft, nontender.   Lymph: No lymphadenopathy. No peripheral edema.  Skin: No visible rashes or ulcers.  Psych: + short term memory loss, +confabulation     LABS:                        10.2   10.25 )-----------( 285      ( 18 Jan 2023 06:35 )             32.3     CBC Full  -  ( 18 Jan 2023 06:35 )  WBC Count : 10.25 K/uL  Hemoglobin : 10.2 g/dL  Hematocrit : 32.3 %  Platelet Count - Automated : 285 K/uL  Mean Cell Volume : 93.4 fl  Mean Cell Hemoglobin : 29.5 pg  Mean Cell Hemoglobin Concentration : 31.6 gm/dL  Auto Neutrophil # : 7.79 K/uL  Auto Lymphocyte # : 1.13 K/uL  Auto Monocyte # : 0.92 K/uL  Auto Eosinophil # : 0.10 K/uL  Auto Basophil # : 0.00 K/uL  Auto Neutrophil % : 68.0 %  Auto Lymphocyte % : 11.0 %  Auto Monocyte % : 9.0 %  Auto Eosinophil % : 1.0 %  Auto Basophil % : 0.0 %    18 Jan 2023 06:35    139    |  107    |  24     ----------------------------<  87     3.5     |  25     |  0.92     Ca    8.3        18 Jan 2023 06:35  Phos  2.2       18 Jan 2023 06:35  Mg     2.3       18 Jan 2023 06:35    TPro  6.2    /  Alb  2.2    /  TBili  0.4    /  DBili  x      /  AST  23     /  ALT  23     /  AlkPhos  61     18 Jan 2023 06:35        CAPILLARY BLOOD GLUCOSE      POCT Blood Glucose.: 83 mg/dL (18 Jan 2023 07:54)        Culture - Blood (collected 01-16-23 @ 06:28)  Source: .Blood Blood  Preliminary Report (01-17-23 @ 12:01):    No growth to date.    Culture - Blood (collected 01-16-23 @ 06:25)  Source: .Blood Blood  Preliminary Report (01-17-23 @ 12:01):    No growth to date.    Culture - Blood (collected 01-14-23 @ 21:30)  Source: .Blood Blood-Peripheral  Gram Stain (01-15-23 @ 11:49):    Growth in aerobic bottle: Gram Negative Rods    Growth in anaerobic bottle: Gram Negative Rods  Final Report (01-17-23 @ 07:59):    Growth in aerobic and anaerobic bottles: Escherichia coli    ***Blood Panel PCR results on this specimen are available    approximately 3 hours after the Gram stain result.***    Gram stain, PCR, and/or culture results may not always    correspond due to difference in methodologies.    ************************************************************    This PCR assay was performed by multiplex PCR. This    Assay tests for 66 bacterial and resistance gene targets.    Please refer to the [facility] test directory    at [email or web address] for details.  Organism: Blood Culture PCR  Escherichia coli (01-17-23 @ 07:59)  Organism: Escherichia coli (01-17-23 @ 07:59)      -  Amikacin: S <=16      -  Ampicillin: S <=8 These ampicillin results predict results for amoxicillin      -  Ampicillin/Sulbactam: S <=4/2 Enterobacter, Klebsiella aerogenes, Citrobacter, and Serratia may develop resistance during prolonged therapy (3-4 days)      -  Aztreonam: S <=4      -  Cefazolin: S <=2 Enterobacter, Klebsiella aerogenes, Citrobacter, and Serratia may develop resistance during prolonged therapy (3-4 days)      -  Cefepime: S <=2      -  Cefoxitin: S <=8      -  Ceftriaxone: S <=1 Enterobacter, Klebsiella aerogenes, Citrobacter, and Serratia may develop resistance during prolonged therapy      -  Ciprofloxacin: S <=0.25      -  Ertapenem: S <=0.5      -  Gentamicin: S <=2      -  Imipenem: S <=1      -  Levofloxacin: S <=0.5      -  Meropenem: S <=1      -  Piperacillin/Tazobactam: S <=8      -  Tobramycin: S <=2      -  Trimethoprim/Sulfamethoxazole: S <=0.5/9.5      Method Type: CHRISTIANO  Organism: Blood Culture PCR (01-17-23 @ 07:59)      -  Escherichia coli: Detec      Method Type: PCR    Culture - Blood (collected 01-14-23 @ 21:26)  Source: .Blood Blood-Peripheral  Gram Stain (01-15-23 @ 12:21):    Growth in anaerobic bottle: Gram Negative Rods    Growth in aerobic bottle: Gram Negative Rods  Final Report (01-17-23 @ 07:59):    Growth in aerobic and anaerobic bottles: Escherichia coli    See previous culture [id number]    Culture - Urine (collected 01-14-23 @ 20:55)  Source: Clean Catch Clean Catch (Midstream)  Final Report (01-18-23 @ 12:44):    >100,000 CFU/ml Escherichia coli  Organism: Escherichia coli (01-18-23 @ 12:44)  Organism: Escherichia coli (01-18-23 @ 12:44)      -  Amikacin: S <=16      -  Amoxicillin/Clavulanic Acid: S <=8/4      -  Ampicillin: S <=8 These ampicillin results predict results for amoxicillin      -  Ampicillin/Sulbactam: S <=4/2 Enterobacter, Klebsiella aerogenes, Citrobacter, and Serratia may develop resistance during prolonged therapy (3-4 days)      -  Aztreonam: S <=4      -  Cefazolin: S <=2 For uncomplicated UTI with K. pneumoniae, E. coli, or P. mirablis: CHRISTIANO <=16 is sensitive and CHRISTIANO >=32 is resistant. This also predicts results for oral agents cefaclor, cefdinir, cefpodoxime, cefprozil, cefuroxime axetil, cephalexin and locarbef for uncomplicated UTI. Note that some isolates may be susceptible to these agents while testing resistant to cefazolin.      -  Cefepime: S <=2      -  Cefoxitin: S <=8      -  Ceftriaxone: S <=1 Enterobacter, Klebsiella aerogenes, Citrobacter, and Serratia may develop resistance during prolonged therapy      -  Cefuroxime: S <=4      -  Ciprofloxacin: S <=0.25      -  Ertapenem: S <=0.5      -  Gentamicin: S <=2      -  Imipenem: S <=1      -  Levofloxacin: S <=0.5      -  Meropenem: S <=1      -  Nitrofurantoin: S <=32 Should not be used to treat pyelonephritis      -  Piperacillin/Tazobactam: S <=8      -  Tobramycin: S <=2      -  Trimethoprim/Sulfamethoxazole: S <=0.5/9.5      Method Type: CHRISTIANO        RADIOLOGY & ADDITIONAL TESTS:    Personally reviewed.     Consultant(s) Notes Reviewed:  [x] YES  [ ] NO

## 2023-01-18 NOTE — SOCIAL WORK PROGRESS NOTE - NSSWPROGRESSNOTE_GEN_ALL_CORE
SW met with pt and pt son at pt bedside. MD stating d/c plan to [facility] tomorrow. SW discussed [facility] choices with pt son and he is in agreement with referral to [facility] SW sent referral. SW is pending other choices from pt son for [facility]. SW to follow up for d/c plan. SW will remain available for any needs.

## 2023-01-18 NOTE — PROGRESS NOTE ADULT - SUBJECTIVE AND OBJECTIVE BOX
[facility] DIV[facility] of INFECTIOUS DISEASE  Shorty Nolasco MD PhD, Jenna Hayward MD, Abbie Ahmadi MD, Shayy Philip MD, Ralph Lares MD  and providing coverage with Daisy Carrington MD  Providing Infectious Disease Consultations at [facility], [facility], [facility]'s    Office# [phone] to schedule follow up appointments  Answering Service for urgent calls or New Consults [phone]  Cell# to text for urgent issues Shorty Nolasco [phone]     infectious diseases progress note:    CHAVA JOSEPH is a 87y y. o. Female patient    Overnight and events of the last 24hrs reviewed    Allergies    Allergy Status Unknown  No Known Allergies    Intolerances        ANTIBIOTICS/RELEVANT:  antimicrobials  cefTRIAXone   IVPB 1000 milliGRAM(s) IV Intermittent every 24 hours    immunologic:    OTHER:  acetaminophen     Tablet .. 650 milliGRAM(s) Oral every 6 hours PRN  aluminum hydroxide/magnesium hydroxide/simethicone Suspension 30 milliLiter(s) Oral every 4 hours PRN  heparin   Injectable 5000 Unit(s) SubCutaneous every 12 hours  lidocaine   4% Patch 1 Patch Transdermal daily  melatonin 3 milliGRAM(s) Oral at bedtime PRN  methyl salicylate 15%/menthol 10% Topical Cream 1 Application(s) Topical four times a day PRN  methyl salicylate 15%/menthol 10% Topical Cream 1 Application(s) Topical four times a day PRN  metoprolol tartrate 12.5 milliGRAM(s) Oral two times a day  ondansetron Injectable 4 milliGRAM(s) IV Push every 8 hours PRN      Objective:  Vital Signs Last 24 Hrs  T(C): 36.3 (18 Jan 2023 11:51), Max: 36.8 (18 Jan 2023 06:17)  T(F): 97.3 (18 Jan 2023 11:51), Max: 98.2 (18 Jan 2023 06:17)  HR: 80 (18 Jan 2023 11:51) (77 - 82)  BP: 153/84 (18 Jan 2023 11:51) (129/79 - 159/93)  BP(mean): --  RR: 16 (18 Jan 2023 11:51) (16 - 16)  SpO2: 98% (18 Jan 2023 11:51) (94% - 100%)    Parameters below as of 18 Jan 2023 11:51  Patient On (Oxygen Delivery Method): room air        T(C): 36.3 (01-18-23 @ 11:51), Max: 36.8 (01-16-23 @ 21:35)  T(C): 36.3 (01-18-23 @ 11:51), Max: 37.1 (01-15-23 @ 21:07)  T(C): 36.3 (01-18-23 @ 11:51), Max: 37.1 (01-15-23 @ 03:06)    PHYSICAL EXAM:  HEENT: NC atraumatic  Neck: supple  Respiratory: no accessory muscle use, breathing comfortably  Cardiovascular: distant  Gastrointestinal: normal appearing, nondistended  Extremities: no clubbing, no cyanosis,        LABS:                          10.2   10.25 )-----------( 285      ( 18 Jan 2023 06:35 )             32.3       WBC  10.25 01-18 @ 06:35  13.39 01-17 @ 07:26  20.47 01-16 @ 06:25  26.99 01-15 @ 08:34  25.44 01-14 @ 19:00      01-18    139  |  107  |  24<H>  ----------------------------<  87  3.5   |  25  |  0.92    Ca    8.3<L>      18 Jan 2023 06:35  Phos  2.2     01-18  Mg     2.3     01-18    TPro  6.2  /  Alb  2.2<L>  /  TBili  0.4  /  DBili  x   /  AST  23  /  ALT  23  /  AlkPhos  61  01-18      Creatinine, Serum: 0.92 mg/dL (01-18-23 @ 06:35)  Creatinine, Serum: 1.10 mg/dL (01-17-23 @ 07:26)  Creatinine, Serum: 1.10 mg/dL (01-16-23 @ 06:25)  Creatinine, Serum: 1.50 mg/dL (01-15-23 @ 08:34)  Creatinine, Serum: 1.80 mg/dL (01-14-23 @ 19:00)    INFLAMMATORY MARKERS      MICROBIOLOGY:    Culture - Blood (01.14.23 @ 21:30)    -  Escherichia coli: Detec    Gram Stain:   Growth in aerobic bottle: Gram Negative Rods  Growth in anaerobic bottle: Gram Negative Rods    -  Amikacin: S <=16    -  Ampicillin: S <=8 These ampicillin results predict results for amoxicillin    -  Ampicillin/Sulbactam: S <=4/2 Enterobacter, Klebsiella aerogenes, Citrobacter, and Serratia may develop resistance during prolonged therapy (3-4 days)    -  Aztreonam: S <=4    -  Cefazolin: S <=2 Enterobacter, Klebsiella aerogenes, Citrobacter, and Serratia may develop resistance during prolonged therapy (3-4 days)    -  Cefepime: S <=2    -  Cefoxitin: S <=8    -  Ceftriaxone: S <=1 Enterobacter, Klebsiella aerogenes, Citrobacter, and Serratia may develop resistance during prolonged therapy    -  Ciprofloxacin: S <=0.25    -  Ertapenem: S <=0.5    -  Gentamicin: S <=2    -  Imipenem: S <=1    -  Levofloxacin: S <=0.5    -  Meropenem: S <=1    -  Piperacillin/Tazobactam: S <=8    -  Tobramycin: S <=2    -  Trimethoprim/Sulfamethoxazole: S <=0.5/9.5    Specimen Source: .Blood Blood-Peripheral    Organism: Escherichia coli    Organism: Blood Culture PCR    Culture Results:   Growth in aerobic and anaerobic bottles: Escherichia coli  ***Blood Panel PCR results on this specimen are available  approximately 3 hours after the Gram stain result.***  Gram stain, PCR, and/or culture results may not always  correspond due to difference in methodologies.  ************************************************************  This PCR assay was performed by multiplex PCR. This  Assay tests for 66 bacterial and resistance gene targets.  Please refer to the [facility] test directory  at [email or web address] for details.    Organism Identification: Blood Culture PCR  Escherichia coli    Method Type: PCR    Method Type: CHRISTIANO        RADIOLOGY & ADDITIONAL STUDIES:

## 2023-01-18 NOTE — PROGRESS NOTE ADULT - SUBJECTIVE AND OBJECTIVE BOX
[facility] -- Jeremi Perkins Pannella, Patel, [facility]  Office # [phone]      Follow Up:    tachycardia   Subjective/Observations:   No events overnight resting comfortably in bed.  No complaints of chest pain, dyspnea, or palpitations reported. No signs of orthopnea or PND.  remains on nasal cannula     REVIEW OF SYSTEMS: All other review of systems is negative unless indicated above    PAST MEDICAL & SURGICAL HISTORY:  HTN (hypertension)      Hypertension          MEDICATIONS  (STANDING):  cefTRIAXone   IVPB 1000 milliGRAM(s) IV Intermittent every 24 hours  heparin   Injectable 5000 Unit(s) SubCutaneous every 12 hours  lidocaine   4% Patch 1 Patch Transdermal daily  metoprolol tartrate 12.5 milliGRAM(s) Oral two times a day    MEDICATIONS  (PRN):  acetaminophen     Tablet .. 650 milliGRAM(s) Oral every 6 hours PRN Temp greater or equal to 38C (100.4F), Mild Pain (1 - 3)  aluminum hydroxide/magnesium hydroxide/simethicone Suspension 30 milliLiter(s) Oral every 4 hours PRN Dyspepsia  melatonin 3 milliGRAM(s) Oral at bedtime PRN Insomnia  methyl salicylate 15%/menthol 10% Topical Cream 1 Application(s) Topical four times a day PRN as per MD  methyl salicylate 15%/menthol 10% Topical Cream 1 Application(s) Topical four times a day PRN lower back pain  ondansetron Injectable 4 milliGRAM(s) IV Push every 8 hours PRN Nausea and/or Vomiting      Allergies    Allergy Status Unknown  No Known Allergies    Intolerances        Vital Signs Last 24 Hrs  T(C): 36.8 (2023 06:17), Max: 36.8 (2023 06:17)  T(F): 98.2 (2023 06:17), Max: 98.2 (2023 06:17)  HR: 82 (2023 06:17) (77 - 82)  BP: 129/79 (2023 06:17) (129/79 - 159/93)  BP(mean): --  RR: 16 (2023 06:17) (16 - 16)  SpO2: 94% (2023 08:41) (94% - 100%)    Parameters below as of 2023 08:41  Patient On (Oxygen Delivery Method): room air        I&O's Summary    2023 07:01  -  2023 07:00  --------------------------------------------------------  IN: 0 mL / OUT: 1050 mL / NET: -1050 mL          PHYSICAL EXAM:  TELE: SR   Constitutional: NAD, awake and alert, well-developed  HEENT: Moist Mucous Membranes, Anicteric  Pulmonary: Non-labored, breath sounds are clear bilaterally, No wheezing, crackles or rhonchi  Cardiovascular: Regular, S1 and S2 nl, murmur   Gastrointestinal: Bowel Sounds present, soft, nontender.   Lymph: No lymphadenopathy. No peripheral edema.  Skin: No visible rashes or ulcers.  Psych:  Mood & affect appropriate    LABS: All Labs Reviewed:                        10.2   10.25 )-----------( 285      ( 2023 06:35 )             32.3                         10.1   13.39 )-----------( 285      ( 2023 07:26 )             31.9                         10.7   20.47 )-----------( 248      ( 2023 06:25 )             33.8     2023 06:35    139    |  107    |  24     ----------------------------<  87     3.5     |  25     |  0.92   2023 07:26    135    |  107    |  33     ----------------------------<  96     3.7     |  23     |  1.10   2023 06:25    137    |  109    |  32     ----------------------------<  87     4.3     |  20     |  1.10     Ca    8.3        2023 06:35  Ca    8.2        2023 07:26  Ca    8.0        2023 06:25  Phos  2.2       2023 06:35  Mg     2.3       2023 06:35  Mg     2.2       2023 06:25    TPro  6.2    /  Alb  2.2    /  TBili  0.4    /  DBili  x      /  AST  23     /  ALT  23     /  AlkPhos  61     2023 06:35  TPro  5.9    /  Alb  2.1    /  TBili  0.5    /  DBili  x      /  AST  31     /  ALT  25     /  AlkPhos  69     2023 06:25             EC Lead ECG:   Ventricular Rate 82 BPM    Atrial Rate 82 BPM    P-R Interval 128 ms    QRS Duration 92 ms    Q-T Interval 386 ms    QTC Calculation(Bazett) 450 ms    P Axis 40 degrees    R Axis -26 degrees    T Axis 25 degrees    Diagnosis Line Sinus rhythm with premature supraventricular complexes  Otherwise normal ECG  When compared with ECG of 15-VIANEY-2023 03:26,  No significant change was found  Confirmed by Lela Soto ([id number]) on 2023 9:09:57 AM (01-15-23 @ 09:34)

## 2023-01-18 NOTE — PROGRESS NOTE ADULT - ASSESSMENT
88yo female PMH Dementia htn admitted with Ecoli sepis with bacteremia sec UTI    Bacteremia with GNR- Ecoli by pcr.  Marked leukocytosis, LEIGH  CT w/ enlarged R kidney w/ perinephric stranding    RECOMMENDATIONS  sp Zosyn (started 1/14)  changed to Ceftriaxone 1/17 1/18 will change to Amoxicillin 1 gram PO BID with last day 1/23  -- BCx + E.coli  -- repeat BCx collected 1/16-NGTD  -- UCx E.coli    Thank you for consulting us and involving us in the management of this most interesting and challenging case.  We will follow along in the care of this patient. Please call us at [phone] or text me directly on my cell# at [phone] with any concerns.

## 2023-01-18 NOTE — PROGRESS NOTE ADULT - PROBLEM SELECTOR PLAN 4
Resolved   Tachycardia multifactorial in setting of dehydration pain and underlying infection, UTI, rhabdomyolysis  - Tele revealing Sr - ST, patient asymptomatic  - on metoprolol 12.5mg bid  - TTE 1/15 performed, f/u results   - Cardio following, recs appreciated

## 2023-01-18 NOTE — PROGRESS NOTE ADULT - ASSESSMENT
86yo female with pmhx htn who presents 1 day after an unwitnessed fall, pt remembers fall and denies LOC.   Cardiology called for tachycardia.  Pt currently c/o back pain, otherwise feels well.  She was found to have UTI    s/p Fall/Tachycardia  -Admitted with rhabdo , sepsis 2/2 UTI with metabolic encephalopathy now improving   - Tele SR no events overnight can dc tele   -continue lopressor BID, bp 129/79 , arb dcd on admission for jc now resolved, pressures have been soft continue to hodl for now   - Follow up TTE results   - Monitor and replete electrolytes. Keep K>4.0 and Mg>2.0.  Tana Cage FNP-C  Cardiology NP  [id number] [phone]

## 2023-01-18 NOTE — PROGRESS NOTE ADULT - PROBLEM SELECTOR PLAN 1
Severe sepsis with E. coli bacteremia and metabolic encephalopathy 2/2 E. coli UTI  - CT abd/pelvis: No obstructive uropathy or urolithiasis. Asymmetrically enlarged right kidney with asymmetric right perinephric stranding. Small b/l pleural effusions.  - Per ID, d/c Zosyn  - Start Rocephin, pending ID recs on switching to PO Rocephin   - Bcx 1/14 with E. coli  - Repeat Bcx 1/16 prelim NGTD, final read pending  - WBC downtrending  - Trend temps, CBC  - ID Dr. Nolasco following, recs appreciated

## 2023-01-18 NOTE — PROGRESS NOTE ADULT - PROBLEM SELECTOR PLAN 6
Resolved  multifactorial in setting of rhabdo and component of ATN in setting of sepsis, LEIGH resolving  - Cr stable, 1.1   - Off IVF  - Monitor BMP

## 2023-01-18 NOTE — PROGRESS NOTE ADULT - PROBLEM SELECTOR PLAN 7
Patient: Miguel Angel Thompson Date: 2018   : 1936    81 year old male      INPATIENT WOUND CARE CONSULT NOTE    Supervising Wound Care / Hyperbaric Medicine Physician: Not Applicable  Consulting Provider:  Eren Hernandez DO  Date of Consultation/Last Comprehensive Exam:  18  Referring  Provider:      SUBJECTIVE:    Chief Complaint:  Consult for wound vac placement after ICD abandoned lead pocket infection, right pectoral, s/p surgical intervention 18:   1. Transvenous extraction ICD lead X 3, pump standby,  2. Placement LCFV 9 Fr Introducer for central venous access   3. Placement RCFV 6 Fr peel-away introducer as access for 12 Fr Introducer and SVC [employer] Bridge balloon   4. ICD pocket capsulectomy and debridement of  subcutaneous tissue right shoulder (first 20 sq cm or less)    Wound/Ulcer Present:    Non-healing surgical wound    Additional Wound Category:  None     Maximum Baseline Ambulatory Status:  Unable to assess    History of Present Illness:  This is a 81 year old male with HTN, HLD, DM 2, depression, hypothyroid, chronic afib with RVR, NICM s/p AICD placement who was transferred to [facility] from [facility] for removal of infected pacemaker leads.  Patient is s/p surgical intervention 18 with Dr. Mata; underwent:   1. Transvenous extraction ICD lead X 3, pump standby,  2. Placement LCFV 9 Fr Introducer for central venous access   3. Placement RCFV 6 Fr peel-away introducer as access for 12 Fr Introducer and SVC [employer] Bridge balloon   4. ICD pocket capsulectomy and debridement of  subcutaneous tissue right shoulder (first 20 sq cm or less)    Patient also seen on this admission by ID who is managing Abx, completed vanc and cefepime, started 18 on doxycycline for a 10 day course. Initially, wound care was consulted postoperatively for a NPWT/Wound vac placement, but patient has had post operative bleeding that limited vac placement until today, 18,  POD3.  Patient reports feeling well today without complaints.  Denies pain with wound, denies drainage/bleeding at this time.  No significant pain to wound site.    Current Treatment Regimen: initiated 1/29/18  Dressing:  Negative-pressure wound therapy   Frequency:  Every other day   Changed by:  Hyperbaric/Wound Care provider    Review of Systems:  Pertinent items are noted in HPI (history of present illness).    Past Medical History:   Diagnosis Date   • Atrial fibrillation (CMS/HCC)     WITH INAPPROPRIATE SHOCKS   • CHF (congestive heart failure) (CMS/HCC)    • Diabetes mellitus (CMS/HCC)    • Morbid obesity (CMS/HCC)    • Near syncope     FREQUENT EPISODES   • VT (ventricular tachycardia) (CMS/HCC)     NONSUSTAINED     Past Surgical History:   Procedure Laterality Date   • EP ICD IMPLANT  12/18/2003    MEDT SINGLE CHAMBER;INITIAL IMPLANT 8/22/1997   • EP STUDY  8/20/1997    EASILY INDUCED SUSTAINED MMVT ON ISUPREL   • ICD GENERATOR CHANGE  7/23/2012    Medtronic single chamber   • TILT  8/1997    POSITIVE FOR NEOROGENIC DYSFUNCTION     Social History     Social History   • Marital status:      Spouse name: N/A   • Number of children: N/A   • Years of education: N/A     Occupational History   • Not on file.     Social History Main Topics   • Smoking status: Former Smoker     Quit date: 1/25/1993   • Smokeless tobacco: Never Used   • Alcohol use No   • Drug use: No   • Sexual activity: Not on file     Other Topics Concern   • Not on file     Social History Narrative   • No narrative on file     History reviewed. No pertinent family history.    Current Facility-Administered Medications   Medication   • acetaminophen (TYLENOL) tablet 650 mg   • WARFARIN - PHYSICIAN MONITORED 1 each   • EPINEPHrine (ADRENALIN) 10 mg in sodium chloride for irrigation 0.9 % 1,000 mL   • sodium chloride (NORMAL SALINE) 0.9 % bolus 500 mL   • nitroGLYcerin (NITROSTAT) sublingual tablet 0.4 mg   • ondansetron (ZOFRAN) injection 4  mg   • doxycycline hyclate (VIBRAMYCIN) capsule 100 mg   • lisinopril (ZESTRIL) tablet 5 mg   • levothyroxine (SYNTHROID, LEVOTHROID) tablet 200 mcg   • spironolactone (ALDACTONE) tablet 12.5 mg   • atenolol (TENORMIN) tablet 50 mg   • atorvastatin (LIPITOR) tablet 80 mg   • sertraline (ZOLOFT) tablet 50 mg   • furosemide (LASIX) tablet 40 mg   • sodium chloride (PF) 0.9 % injection 2 mL   • sodium chloride (PF) 0.9 % injection 2 mL   • potassium chloride (K-DUR,KLOR-CON) CR tablet 20 mEq   • potassium chloride (KLOR-CON) packet 20 mEq   • potassium chloride 20 mEq/100mL IVPB premix   • potassium chloride (K-DUR,KLOR-CON) CR tablet 40 mEq   • potassium chloride (KLOR-CON) packet 40 mEq   • potassium chloride 20 mEq/100mL IVPB premix   • sodium chloride 0.9 % flush bag 500 mL   • dextrose 50 % injection 25 g   • dextrose 5 % infusion   • glucagon (GLUCAGEN) injection 1 mg   • dextrose (GLUTOSE) 40 % gel 15 g   • magnesium sulfate 1 g in dextrose 5% 100 mL IVPB premix   • magnesium sulfate 2 g in 50 mL premix IVPB   • magnesium sulfate 2 g in 50 mL premix IVPB   • insulin lispro (HumaLOG) sliding scale injection   • insulin glargine (LANTUS) injection 10 Units   • sodium chloride 0.9% infusion        ALLERGIES: no known allergies.    OBJECTIVE:  Vital Signs:    Visit Vitals  /69 (BP Location: [facility], Patient Position: Semi-Dietz's)   Pulse 66   Temp 98.2 °F (36.8 °C) (Oral)   Resp 18   Ht 5' 11\" (1.803 m)   Wt 106.7 kg   SpO2 92%   BMI 32.80 kg/m²       Physical Exam:  General appearance: Alert, in no distress and cooperative  Head:   normocephalic without obvious abnormality  Mouth:   patent  Pulmonary: non labored breathing    1/29/18  Right chest wound. Full thickness. circumferential undermining and tunneling deepest at 6 o-clock.    Base with mix of fibrous and granular tissue.  No active bleeding or significant drainage seen on exam today  Leti-wound without induration.  No significant erythema or localized  swelling/edema.    Wound Bed Quality:  as above      Leti-wound Quality:    as above    Additional Descriptors:  as above    Wound Measurements Per Flowsheet:    Wound Chest Right Upper Surgical incision (Active)   Wound Length (cm) 3 cm 1/29/2018 10:30 AM   Wound Width (cm) 5 cm 1/29/2018 10:30 AM   Wound Depth (cm) 2 cm 1/29/2018 10:30 AM   Wound Surface Area (cm2) 15 cm2 1/29/2018 10:30 AM   Wound Volume (cm3) 30 cm3 1/29/2018 10:30 AM   Number of days: 4       Wound Groin Left Puncture (Active)   Number of days: 3       Wound Groin Right Puncture (Active)   Number of days: 3     PROCEDURE:  None performed    Procedure was Performed by:  Not applicable    Laboratory assessments reviewed:  No results found for: PAB   Albumin (g/dL)   Date Value   01/29/2018 2.7 (L)   01/28/2018 2.9 (L)   01/27/2018 2.8 (L)        Recent Labs  Lab 01/28/18  1745 01/28/18  2127 01/29/18  0836   GLUCOSE BEDSIDE 158* 161* 160*       Lab Results   Component Value Date    WBC 5.9 01/29/2018    GLUCOSE 139 (H) 01/29/2018    HGBA1C 6.0 (H) 01/25/2018    CRP <0.3 01/26/2018    CREATININE 0.69 01/29/2018    GFRA >90 01/29/2018    GFRNA 90 01/29/2018        Culture results:  Specimen Description (no units)   Date Value   01/26/2018 Surgical Hardware D RV LEAD 2003 01/26/2018 Surgical Hardware C OLD RV LEAD 1997 01/26/2018 Surgical Hardware B SVC LEAD   01/26/2018 TISSUE A HARDWARE RIGHT ICD POCKET INFECTION    CULTURE (no units)   Date Value   01/26/2018 RARE STAPHYLOCOCCUS SCHLEIFERI (P)   01/26/2018 FEW STAPHYLOCOCCUS SCHLEIFERI (P)   01/26/2018 RARE STAPHYLOCOCCUS SCHLEIFERI (P)   01/26/2018 STAPHYLOCOCCUS SCHLEIFERI (P)        Diagnostic Assessments Reviewed:  No new update    Nutritional Assessment:  Prealbumin and/or Albumin reviewed    Wound treatment goals are palliative:  No    DIAGNOSES:  Non-healing surgical wound  Right chest    Medical Decision Making:   This is a 81 year old male with HTN, HLD, DM 2, depression,  hypothyroid, chronic afib with RVR, NICM s/p AICD placement who was transferred to [facility] from [facility] for removal of infected pacemaker leads.  Patient is s/p surgical intervention 1/26/18 with Dr. Mata; underwent:   1. Transvenous extraction ICD lead X 3, pump standby,  2. Placement LCFV 9 Fr Introducer for central venous access   3. Placement RCFV 6 Fr peel-away introducer as access for 12 Fr Introducer and SVC [employer] Bridge balloon   4. ICD pocket capsulectomy and debridement of  subcutaneous tissue right shoulder (first 20 sq cm or less)     Initially, wound care was consulted postoperatively for a NPWT/Wound vac placement, but patient has had post operative bleeding that limited vac placement until today, 1/29/18, POD3.    Local Wound Care/Wound Vac  NPWT started 1/29/18  Continue wound VAC to right chest at -125 mmHg. Black sponge.  Changed 3x/week and PRN   Patient with circumferential undermining and tunneling deepest at 6 o-clock.  Please only pack sponge half way down to allowing undermining to adhere to chest wall and hopefully eliminate tunneling/unbdermining over time.      If problems with the wound VAC, remove VAC and place a NS, VASHE or Dakins moist gauze packing followed by dry secondary dressing. Changed daily and PRN     Offloading    Discussed offloading and to avoid localized pressure to wound.  Patient's wound is fortunately not in a high risk location for offloading complciations    Diabetic Management    Follow up with PCP for DM management and chronic disease management.  Patient's last A1c was 6.0, continue tight glycemic control  Goal for glucose is less than 150 at all times and a HgbA1c of less than 7.4.    Nutrition    Consume protein daily in your diet.  Also try to drink a protein shake daily and take a multivitamin.  You must consume nutrition regularly three times a day to aid in wound healing.    Infectious Disease  Patient also seen on this admission by ID who  is managing Abx, completed vanc and cefepime, started 1/26/18 on doxycycline for a 10 day course.    Follow Up    Discussed with patient and his daughter to make outpatient follow up arrangements with his physicians back in [place] (he lives 4 hours from here and this is his closest medical facility where most of his physicians are located).  Advised daughter to discuss with social work to arrange home care and outpatient follow up.    RN wound team working on outpatient NPWT  VNA orders placed today    Inpatient team will monitor and plan NPWT changes MWF while patient is still admitted    Plan of Care:  Advanced Wound Care Recommendations:  As above  Percent Wound Closure from consult:  NA  Care plan to augment wound closure:  Not applicable.       Thank you for the opportunity to participate in the care of this patient.        MELINA Youngblood.O.  [facility]  Phone: [phone]       Denies any pain today   Pt had unwitnessed fall on day prior to admission  - CT showing age indeterminate acetabular fracture  - No acute orthopedic surgical intervention at this time  - XR pelvis, Judet views, inlet/outlet rec but patient refusing and says no pain  - To consider MRI L hip if patient pain persists and patient not able to ambulate   - Ortho consulted, recs appreciated  - PT eval, recommends [facility]

## 2023-01-18 NOTE — SOCIAL WORK PROGRESS NOTE - NSSWPROGRESSNOTE_GEN_ALL_CORE
JOSELUIS spoke with MD who is anticipating pt to be medically ready for DC tomorrow 1/19. JOSELUIS spoke with [facility] who stated that they can accept pt for tomorrow 1/19. JOSELUIS spoke with pt son Indra who is requesting PSYCH to see pt prior to DC. MD informed. Pt son in agreement with anticipated DC tomorrow to [facility] SW to f/u regarding d/c plan and remain available for any needs.  JOSELUIS spoke with MD who is anticipating pt to be medically ready for DC tomorrow 1/19. JOSELUIS spoke with [facility] who stated that they can accept pt for tomorrow 1/19. JOSELUIS spoke with pt son Indra who is requesting PSYCH to see pt prior to DC. JOSELUIS received VM from pt son Tomer requesting to speak with SW. JOESLUIS spoke with Indra who provided SW permission to speak with pt son Tomer. Tomer requesting MD to f/u with pt son Indra or himself regarding medical update and if pt went for MRI during hospitalization. MD informed. Pt sons aware of anticipated DC tomorrow to [facility]. SW to f/u regarding d/c plan and remain available for any needs.

## 2023-01-18 NOTE — PROGRESS NOTE ADULT - PROBLEM SELECTOR PLAN 10
SQ for DVT ppx    Dispo:   to [facility] pending location  to speak to son about his wishes   COVID test ordered today in preparation for discharge

## 2023-01-18 NOTE — PROGRESS NOTE ADULT - ATTENDING COMMENTS
The patient was seen and evaluated independently by the attending physician.  - I have personally reviewed the pt's labs, imaging, micro data and consultant recommendations.    86yo female with pmhx htn who presents 1 day after an unwitnessed fall admitted for severe sepsis with gram negative bacteremia metabolic encephalopathy admitted for a UTI, age indeterminate fracture of acetabulum, and tachycardia    #Severe sepsis with E. coli bacteremia and metabolic encephalopathy 2/2 E. coli UTI  #LEIGH 2/2 rhabdo and sepsis  #hip pain- ortho consulted  #anemia, stable  -leukocytosis and renal indices improving  -c/w ceftriaxone for now - change to po tomorrow - spoke w/ ID  -serial mental status assessments  -f/u repeat blood cultures - [facility] from 1/16/23

## 2023-01-19 ENCOUNTER — TRANSCRIPTION ENCOUNTER (OUTPATIENT)
Age: 88
End: 2023-01-19

## 2023-01-19 LAB
ALBUMIN SERPL ELPH-MCNC: 2.3 G/DL — LOW (ref 3.3–5)
ALP SERPL-CCNC: 57 U/L — SIGNIFICANT CHANGE UP (ref 40–120)
ALT FLD-CCNC: 24 U/L — SIGNIFICANT CHANGE UP (ref 12–78)
AMMONIA BLD-MCNC: 26 UMOL/L — SIGNIFICANT CHANGE UP (ref 11–32)
ANION GAP SERPL CALC-SCNC: 6 MMOL/L — SIGNIFICANT CHANGE UP (ref 5–17)
AST SERPL-CCNC: 23 U/L — SIGNIFICANT CHANGE UP (ref 15–37)
BILIRUB SERPL-MCNC: 0.3 MG/DL — SIGNIFICANT CHANGE UP (ref 0.2–1.2)
BUN SERPL-MCNC: 17 MG/DL — SIGNIFICANT CHANGE UP (ref 7–23)
CALCIUM SERPL-MCNC: 8.1 MG/DL — LOW (ref 8.5–10.1)
CHLORIDE SERPL-SCNC: 107 MMOL/L — SIGNIFICANT CHANGE UP (ref 96–108)
CO2 SERPL-SCNC: 25 MMOL/L — SIGNIFICANT CHANGE UP (ref 22–31)
CREAT SERPL-MCNC: 0.87 MG/DL — SIGNIFICANT CHANGE UP (ref 0.5–1.3)
EGFR: 65 ML/MIN/1.73M2 — SIGNIFICANT CHANGE UP
FOLATE SERPL-MCNC: 17.9 NG/ML — SIGNIFICANT CHANGE UP
GLUCOSE SERPL-MCNC: 87 MG/DL — SIGNIFICANT CHANGE UP (ref 70–99)
HCT VFR BLD CALC: 30.9 % — LOW (ref 34.5–45)
HGB BLD-MCNC: 10 G/DL — LOW (ref 11.5–15.5)
MCHC RBC-ENTMCNC: 29.6 PG — SIGNIFICANT CHANGE UP (ref 27–34)
MCHC RBC-ENTMCNC: 32.4 GM/DL — SIGNIFICANT CHANGE UP (ref 32–36)
MCV RBC AUTO: 91.4 FL — SIGNIFICANT CHANGE UP (ref 80–100)
NRBC # BLD: 0 /100 WBCS — SIGNIFICANT CHANGE UP (ref 0–0)
PLATELET # BLD AUTO: 312 K/UL — SIGNIFICANT CHANGE UP (ref 150–400)
POTASSIUM SERPL-MCNC: 3.4 MMOL/L — LOW (ref 3.5–5.3)
POTASSIUM SERPL-SCNC: 3.4 MMOL/L — LOW (ref 3.5–5.3)
PROT SERPL-MCNC: 6.2 G/DL — SIGNIFICANT CHANGE UP (ref 6–8.3)
RBC # BLD: 3.38 M/UL — LOW (ref 3.8–5.2)
RBC # FLD: 14.6 % — HIGH (ref 10.3–14.5)
SODIUM SERPL-SCNC: 138 MMOL/L — SIGNIFICANT CHANGE UP (ref 135–145)
TSH SERPL-MCNC: 2.21 UIU/ML — SIGNIFICANT CHANGE UP (ref 0.36–3.74)
VIT B12 SERPL-MCNC: 1944 PG/ML — HIGH (ref 232–1245)
WBC # BLD: 9.55 K/UL — SIGNIFICANT CHANGE UP (ref 3.8–10.5)
WBC # FLD AUTO: 9.55 K/UL — SIGNIFICANT CHANGE UP (ref 3.8–10.5)

## 2023-01-19 PROCEDURE — 99233 SBSQ HOSP IP/OBS HIGH 50: CPT | Mod: GC

## 2023-01-19 PROCEDURE — 99232 SBSQ HOSP IP/OBS MODERATE 35: CPT

## 2023-01-19 PROCEDURE — 72148 MRI LUMBAR SPINE W/O DYE: CPT | Mod: 26

## 2023-01-19 PROCEDURE — 70551 MRI BRAIN STEM W/O DYE: CPT | Mod: 26

## 2023-01-19 RX ORDER — AMOXICILLIN 250 MG/5ML
2 SUSPENSION, RECONSTITUTED, ORAL (ML) ORAL
Qty: 16 | Refills: 0
Start: 2023-01-19 | End: 2023-01-22

## 2023-01-19 RX ORDER — AMOXICILLIN 250 MG/5ML
2 SUSPENSION, RECONSTITUTED, ORAL (ML) ORAL
Qty: 10 | Refills: 0
Start: 2023-01-19 | End: 2023-01-22

## 2023-01-19 RX ADMIN — Medication 1000 MILLIGRAM(S): at 18:04

## 2023-01-19 RX ADMIN — Medication 12.5 MILLIGRAM(S): at 05:47

## 2023-01-19 RX ADMIN — HEPARIN SODIUM 5000 UNIT(S): 5000 INJECTION INTRAVENOUS; SUBCUTANEOUS at 05:41

## 2023-01-19 RX ADMIN — Medication 1 PACKET(S): at 18:04

## 2023-01-19 RX ADMIN — Medication 1 PACKET(S): at 05:41

## 2023-01-19 RX ADMIN — LIDOCAINE 1 PATCH: 4 CREAM TOPICAL at 16:33

## 2023-01-19 RX ADMIN — LIDOCAINE 1 PATCH: 4 CREAM TOPICAL at 05:15

## 2023-01-19 RX ADMIN — HEPARIN SODIUM 5000 UNIT(S): 5000 INJECTION INTRAVENOUS; SUBCUTANEOUS at 18:03

## 2023-01-19 RX ADMIN — LIDOCAINE 1 PATCH: 4 CREAM TOPICAL at 19:13

## 2023-01-19 RX ADMIN — Medication 1000 MILLIGRAM(S): at 05:41

## 2023-01-19 RX ADMIN — Medication 12.5 MILLIGRAM(S): at 18:04

## 2023-01-19 NOTE — SOCIAL WORK PROGRESS NOTE - NSSWPROGRESSNOTE_GEN_ALL_CORE
JOSELUIS spoke with MD who stated that pt is planned for MRI today. MD stated that he will call pt son Indra regarding medical update. Pt anticipated to be DCed to [facility] pending MRI results either later today or tomorrow. SW to f/u regarding DC plan and remain available for any needs.

## 2023-01-19 NOTE — PROGRESS NOTE ADULT - ASSESSMENT
88 yo female with pmhx htn who presents 1 day after an unwitnessed fall, consulted for tachycardia.      s/p Fall/Tachycardia  - a/f  rhabdo,  sepsis 2/2 UTI with metabolic encephalopathy now improving  - HR improved 70-80's per flow sheet, now off tele   - BP stable   - Continue BB.  Hold home anti-HTN meds to allow for BB administration  - Pain control per Primary  - TTE (1/13/2023) Normal LVEF of 65%. Trace physiologic MR and TR. Trace pericardial effusion.  - euvolemic on exam. No O2 requirement   - Monitor and replete Lytes. Keep K > 4 and Mg > 2    - DC planning per primary   - Will continue to follow.    Arlyn Lord [facility]  Nurse Practitioner - Cardiology   Spectra #[id number]/ [phone]

## 2023-01-19 NOTE — PROGRESS NOTE ADULT - ASSESSMENT
88yo female PMH Dementia htn admitted with Ecoli sepis with bacteremia sec UTI    Bacteremia with GNR- Ecoli by pcr.  Marked leukocytosis, LEIGH  CT w/ enlarged R kidney w/ perinephric stranding    RECOMMENDATIONS  sp Zosyn (started 1/14)  changed to Ceftriaxone 1/17 1/18 will change to Amoxicillin 1 gram PO BID with last day 1/23  -- BCx + E.coli  -- repeat BCx collected 1/16-NGTD  -- UCx E.coli    From an ID standpoint no further requirement for inpatient status for the management of ID issues. Fine with discharge from ID standpoint when other medical issues no longer require inpatient care and social issues allow for a safe discharge plan. To schedule an outpatient ID follow up appointment please call our office at [phone]    Thank you for consulting us and involving us in the management of this most interesting and challenging case.  Please call us at [phone] or text me directly on my cell#[phone] with any concerns or further questions.

## 2023-01-19 NOTE — PROGRESS NOTE ADULT - SUBJECTIVE AND OBJECTIVE BOX
[facility] -- Maddie Arevalo Wachsman, Pannella, Patel, Savella, Goodger  Office # [phone]    Follow Up:  Tachycardia    Subjective/Observations: seen and examined, awake, alert, sitting comfortably in the chair, denies chest pain, dyspnea, palpitations or dizziness. Tolerating room air.     REVIEW OF SYSTEMS: All other review of systems is negative unless indicated above  PAST MEDICAL & SURGICAL HISTORY:  HTN (hypertension)      Hypertension        MEDICATIONS  (STANDING):  amoxicillin 1000 milliGRAM(s) Oral two times a day  heparin   Injectable 5000 Unit(s) SubCutaneous every 12 hours  lidocaine   4% Patch 1 Patch Transdermal daily  metoprolol tartrate 12.5 milliGRAM(s) Oral two times a day  potassium phosphate / sodium phosphate Powder (PHOS-NaK) 1 Packet(s) Oral two times a day    MEDICATIONS  (PRN):  acetaminophen     Tablet .. 650 milliGRAM(s) Oral every 6 hours PRN Temp greater or equal to 38C (100.4F), Mild Pain (1 - 3)  aluminum hydroxide/magnesium hydroxide/simethicone Suspension 30 milliLiter(s) Oral every 4 hours PRN Dyspepsia  melatonin 3 milliGRAM(s) Oral at bedtime PRN Insomnia  methyl salicylate 15%/menthol 10% Topical Cream 1 Application(s) Topical four times a day PRN as per MD  methyl salicylate 15%/menthol 10% Topical Cream 1 Application(s) Topical four times a day PRN lower back pain  ondansetron Injectable 4 milliGRAM(s) IV Push every 8 hours PRN Nausea and/or Vomiting    Allergies    Allergy Status Unknown  No Known Allergies    Intolerances      Vital Signs Last 24 Hrs  T(C): 36.8 (19 Jan 2023 05:31), Max: 36.8 (19 Jan 2023 05:31)  T(F): 98.2 (19 Jan 2023 05:31), Max: 98.2 (19 Jan 2023 05:31)  HR: 73 (19 Jan 2023 05:31) (73 - 81)  BP: 150/60 (19 Jan 2023 05:31) (138/82 - 153/84)  BP(mean): --  RR: 16 (19 Jan 2023 05:31) (16 - 16)  SpO2: 96% (19 Jan 2023 05:31) (94% - 98%)    Parameters below as of 19 Jan 2023 05:31  Patient On (Oxygen Delivery Method): room air      I&O's Summary    18 Jan 2023 07:01  -  19 Jan 2023 07:00  --------------------------------------------------------  IN: 0 mL / OUT: 850 mL / NET: -850 mL        TELE: Not on telemetry   PHYSICAL EXAM:  Constitutional: NAD, awake and alert, well-developed  HEENT: Moist Mucous Membranes, Anicteric  Pulmonary: Non-labored, breath sounds are clear bilaterally, No wheezing, rales or rhonchi  Cardiovascular: Regular, S1 and S2, + murmurs, rubs, gallops or clicks  Gastrointestinal: Bowel Sounds present, soft, nontender.   Lymph: No peripheral edema. No lymphadenopathy.  Skin: No visible rashes or ulcers.  Psych:  Mood & affect appropriate  LABS: All Labs Reviewed:                        10.0   9.55  )-----------( 312      ( 19 Jan 2023 06:50 )             30.9                         10.2   10.25 )-----------( 285      ( 18 Jan 2023 06:35 )             32.3                         10.1   13.39 )-----------( 285      ( 17 Jan 2023 07:26 )             31.9     19 Jan 2023 06:50    138    |  107    |  17     ----------------------------<  87     3.4     |  25     |  0.87   18 Jan 2023 06:35    139    |  107    |  24     ----------------------------<  87     3.5     |  25     |  0.92   17 Jan 2023 07:26    135    |  107    |  33     ----------------------------<  96     3.7     |  23     |  1.10     Ca    8.1        19 Jan 2023 06:50  Ca    8.3        18 Jan 2023 06:35  Ca    8.2        17 Jan 2023 07:26  Phos  2.2       18 Jan 2023 06:35  Mg     2.3       18 Jan 2023 06:35    TPro  6.2    /  Alb  2.3    /  TBili  0.3    /  DBili  x      /  AST  23     /  ALT  24     /  AlkPhos  57     19 Jan 2023 06:50  TPro  6.2    /  Alb  2.2    /  TBili  0.4    /  DBili  x      /  AST  23     /  ALT  23     /  AlkPhos  61     18 Jan 2023 06:35          12 Lead ECG:   Ventricular Rate 82 BPM    Atrial Rate 82 BPM    P-R Interval 128 ms    QRS Duration 92 ms    Q-T Interval 386 ms    QTC Calculation(Bazett) 450 ms    P Axis 40 degrees    R Axis -26 degrees    T Axis 25 degrees    Diagnosis Line Sinus rhythm with premature supraventricular complexes  Otherwise normal ECG  When compared with ECG of 15-VIANEY-2023 03:26,  No significant change was found  Confirmed by Lela Soto ([id number]) on 1/16/2023 9:09:57 AM (01-15-23 @ 09:34)      ACC: [id number] EXAM:  ECHO TTE WO CON COMP W DOPP                          PROCEDURE DATE:  01/17/2023          INTERPRETATION:  INDICATION: Abnormal EKG  Sonographer KL    Blood Pressure 136/81    Height 158 cm     Weight 57 kg       BSA 1.6 sq m    Dimensions:  LA 2.3       Normal Values: 2.0 - 4.0 cm  Ao 3.3        Normal Values: 2.0 - 3.8 cm  SEPTUM 1.2       Normal Values: 0.6 - 1.2 cm  PWT 1.1       Normal Values: 0.6 - 1.1 cm  LVIDd 4.0         Normal Values: 3.0 - 5.6 cm  LVIDs 2.4  Normal Values: 1.8 - 4.0 cm      OBSERVATIONS:  Mitral Valve: normal, trace physiologic MR.  Aortic Valve/Aorta: Sclerotic trileaflet aortic valve. Trace to mild AI  Tricuspid Valve: normal with trace TR.  Pulmonic Valve: Not well-visualized  Left Atrium: normal  Right Atrium: Not well-visualized  Left Ventricle: normal LV size and systolic function, estimated LVEF of   65%.  Right Ventricle: Grossly normal size and systolic function.  Pericardium: Trace pericardial effusion.      IMPRESSION:  Normal left ventricular internal dimensions and systolic function,   estimated LVEF of 65%.  Grossly normal RV size and systolic function.  Sclerotic trileaflet aortic valve, trace to mild AI.  Trace physiologic MR and TR.  Trace pericardial effusion.    --- End of Report ---            LELA SOTO MD; Attending Cardiologist  This document has been electronically signed. Jan 18 2023 12:47PM

## 2023-01-19 NOTE — DISCHARGE NOTE NURSING/CASE MANAGEMENT/SOCIAL WORK - PATIENT PORTAL LINK FT
You can access the FollowMyHealth Patient Portal offered by [facility] by registering at the following website: [email or web address]. By joining [facility]’s FollowMyHealth portal, you will also be able to view your health information using other applications (apps) compatible with our system.

## 2023-01-19 NOTE — PROGRESS NOTE ADULT - PROBLEM SELECTOR PLAN 2
Per son, patient has short term memory loss and confabulates at baseline  - mental status improving to baseline, patient responsive and interactive  - Neuro Dr. Mccollum believes patient's mental status is d/t ME but MRI head and lumbar spine ordered, f/u results

## 2023-01-19 NOTE — PROGRESS NOTE ADULT - SUBJECTIVE AND OBJECTIVE BOX
Neurology Follow up note    CHAVA [id number]    HPI:  Pt is an 88yo female with pmhx htn who presents 1 day after an unwitnessed fall. At 730PM on day prior to admission son noticed on cameras that the pt was collapsed on the ground of the bathroom floor. He contacted the fire department who arrived to the scene where they all decided not to take the pt to the hospital. The pt was unable to walk or stand without assistance. The son stayed with her overnight. On the morning of admission the pt woke up and still was unable to walk or stand on her own. She was also complaining of right sided hip pain. She usually walks fine on her own and only occasionally used a cane outside her home. She was tremulous and was dizzy when she was able to stand up with assistance. At that point the family decided to take her to [facility]. They also reported episodes of confusion that would come and go recently. Her last fall was mechanical and was about 4-5 years ago which resulted in a hemorrhage. Her last UTI was several years ago. Of note pt's son believes that the pt had poor PO intake on day prior to admission.      ED Course:    Temp 98.5, , /70, RR 18, SPO2 97% RA  s/p Iv Zosyn x1, 1L NS, 0.5L NS  EKG: sinus tachycardia with premature atrial complexes, left axis deviation, inferior infarct  Labs significant for: 25.44 WBC, 22.76 neutrophil, 2.3 lactate, 130 Na, 3.2K, 42 BUN, 1.8 Cr, AST 56, 888 Creatinine Kinase, Moderate Leukocyte esterase, 26-50 WBC   Imaging:  CT HEAD: No acute intracranial hemorrhage, mass effect, or osseous   fracture.   CT CERVICAL SPINE: No acute cervical spine fracture or traumatic   malalignment. Multilevel cervical spondylosis.   (14 Jan 2023 22:43)      Interval History -no new events    Patient is seen, chart was reviewed and case was discussed with the treatment team.  Pt is not in any distress.   Lying on bed comfortably.   .    Vital Signs Last 24 Hrs  T(C): 36.6 (19 Jan 2023 12:52), Max: 36.8 (19 Jan 2023 05:31)  T(F): 97.9 (19 Jan 2023 12:52), Max: 98.2 (19 Jan 2023 05:31)  HR: 97 (19 Jan 2023 12:52) (73 - 97)  BP: 158/89 (19 Jan 2023 12:52) (138/82 - 158/89)  BP(mean): --  RR: 18 (19 Jan 2023 12:52) (16 - 18)  SpO2: 97% (19 Jan 2023 12:52) (94% - 97%)    Parameters below as of 19 Jan 2023 12:52  Patient On (Oxygen Delivery Method): room air            REVIEW OF SYSTEMS:    Constitutional: No fever, weight loss or fatigue  Eyes: No eye pain, visual disturbances, or discharge  ENT:  No difficulty hearing, tinnitus, vertigo; No sinus or throat pain  Neck: No pain or stiffness  Respiratory: No cough, wheezing, chills or hemoptysis  Cardiovascular: No chest pain, palpitations, shortness of breath,   Gastrointestinal: No abdominal or epigastric pain. No nausea, vomiting or hematemesis;  Genitourinary: No dysuria, frequency, hematuria or incontinence  Neurological: No headaches, loss of strength, numbness or tremors  Psychiatric: No depression, anxiety, mood swings or difficulty sleeping  Musculoskeletal: No joint pain or swelling; No muscle, back or extremity pain  Skin: No itching, burning, rashes or lesions   Lymph Nodes: No enlarged glands  Endocrine: No heat or cold intolerance; No hair loss,  Allergy and Immunologic: No hives or eczema    On Neurological Examination:    Mental Status - Pt is alert, awake, oriented X3 Follows commands well and able to answer questions appropriately.Mood and affect  normal    Speech -  Normal.     Cranial Nerves - Pupils 3 mm equal and reactive to light, extraocular eye movements intact. Pt has no visual field deficit.  Pt has no facial asymmetry. Facial sensation is intact.Tongue - is in midline.    Muscle tone - is normal     Motor Exam - 5/5 of UE  RLE 4/5; LLE 3/5   No drift. No shaking or tremors.    Sensory Exam . Pt withdraws all extremities equally on stimulation. No asymmetry seen. No complaints of tingling, numbness.  coordination:    Finger to nose: mireya    Deep tendon Reflexes - 2 plus all over.         Neck Supple -  Yes.     MEDICATIONS    acetaminophen     Tablet .. 650 milliGRAM(s) Oral every 6 hours PRN  aluminum hydroxide/magnesium hydroxide/simethicone Suspension 30 milliLiter(s) Oral every 4 hours PRN  amoxicillin 1000 milliGRAM(s) Oral two times a day  heparin   Injectable 5000 Unit(s) SubCutaneous every 12 hours  lidocaine   4% Patch 1 Patch Transdermal daily  melatonin 3 milliGRAM(s) Oral at bedtime PRN  methyl salicylate 15%/menthol 10% Topical Cream 1 Application(s) Topical four times a day PRN  methyl salicylate 15%/menthol 10% Topical Cream 1 Application(s) Topical four times a day PRN  metoprolol tartrate 12.5 milliGRAM(s) Oral two times a day  ondansetron Injectable 4 milliGRAM(s) IV Push every 8 hours PRN  potassium phosphate / sodium phosphate Powder (PHOS-NaK) 1 Packet(s) Oral two times a day      Allergies    Allergy Status Unknown  No Known Allergies    Intolerances        LABS:  CBC Full  -  ( 19 Jan 2023 06:50 )  WBC Count : 9.55 K/uL  RBC Count : 3.38 M/uL  Hemoglobin : 10.0 g/dL  Hematocrit : 30.9 %  Platelet Count - Automated : 312 K/uL  Mean Cell Volume : 91.4 fl  Mean Cell Hemoglobin : 29.6 pg  % : x      01-19    138  |  107  |  17  ----------------------------<  87  3.4<L>   |  25  |  0.87    Ca    8.1<L>      19 Jan 2023 06:50  Phos  2.2     01-18  Mg     2.3     01-18    TPro  6.2  /  Alb  2.3<L>  /  TBili  0.3  /  DBili  x   /  AST  23  /  ALT  24  /  AlkPhos  57  01-19    Hemoglobin A1C:     Vitamin B12 Vitamin B12, Serum: 1944 pg/mL (01-19 @ 06:50)      RADIOLOGY  < from: MR Head No Cont (01.19.23 @ 15:42) >    ACC: [id number] EXAM:  MR BRAIN   ORDERED BY: YOBANI CHEN     PROCEDURE DATE:  01/19/2023          INTERPRETATION:  MR of the brain without gadolinium contrast    CLINICAL INFORMATION:  Metabolic encephalopathy post fall    TECHNIQUE:   Sagittal and axial T1-weighted images, axial FLAIR images,   axial susceptibility weighted images, axial T2-weighted images and axial   diffusion weighted images of the brain were obtained.    COMPARISON:   CT head 1/14/2023 also 2019 available for review.    FINDINGS:    BRAIN and CSF SPACES: In the right anterior parietal lobe subcortical   white matter deep to the precentral gyrus there is a small focus of acute   infarction. This lesion is hyperintense on the diffusion weighted images   and hypointense on the ADC images, inconspicuous on the T1-weighted   images and obscured by confluent white matter lesions on the long TR   images. No cerebral cortical lesion is recognized    The brain also demonstrates moderately extensive patchy and confluent  lesions within the deep cerebral hemispheric white matter that are   typical for ischemic white matter disease. This is most extensive in the   centrum semiovale, periatrial and posterior periventricular white matter.   Additional smaller subcortical and deeper scattered lesions are evident.   No cerebral cortical lesion is recognized. No acute cerebral cortical   infarct is found.   No mass effect is found in the brain.    In the left posterior frontal parasagittal vertex small subcortical foci   of low signal intensity on the susceptibility weighted images and   diffusion weighted images are consistent with remote petechial   hemorrhage. A second similar parenchymal lesion is noted in the right   thalamus.. There are additional small indistinct foci of similar low   signal intensity projecting over the left frontal parasagittal vertex   within the extra-axial space. On FLAIR imaging tiny crescentic subdural   collections are low intermediate signal intensity, largely imperceptible   on the T2-weighted and T1-weighted images, also largely imperceptible by   CT, without associated mass effect. At the left frontal vertex is extends   along the left anterior falx.    Within numerous sulci of the cerebral hemispheres, most evident in the   right frontal lobe, there is linear low signal intensity on the   susceptibility weighted images, inconspicuous on the remaining pulse   sequences.    The ventricles, sulci and basal cisterns  appear mild to moderately   dilated reflecting diffuse brain volume loss.    VESSELS:   The vertebral and internal carotid arteries demonstrate   expected flow voids indicating their patency.  The left vertebral artery   is dominant caliber.    HEAD AND NECK STRUCTURES:   The orbits demonstrate evidenceof lens   replacement.  Paranasal sinuses are clear.  The nasal cavity appears   intact.  The central skull base appears intact.  The nasopharynx is   symmetric.  The temporal bones appear clear of disease.  The calvarium   appears unremarkable.      IMPRESSION:    1. Right parietal subcortical white matter small acute infarction    2. 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 Jan 19 2023  4:11PM  < from: MR Lumbar Spine No Cont (01.19.23 @ 15:43) >    ACC: [id number] EXAM:  MR SPINE LUMBAR   ORDERED BY: YOBANI CHEN     PROCEDURE DATE:  01/19/2023          INTERPRETATION:  MR LUMBAR SPINE    Clinical information: Back pain difficulty walking    ADDITIONAL CLINICAL INFORMATION: Not Applicable    A noncontrast MRI of the lumbar spine was performed.    TECHNIQUE:  In the sagittal plane T1, T2, and STIR imaging was performed.  In the   axial plane T1 and T2 weighted imaging was utilized. In the coronal plane   T2 weighted images were obtained.    COMPARISON:  Abdomen and pelvis CT of 1/16/2023    FINDINGS:  SPINAL COLUMN:  Straightening of the normal lumbar lordosis. No subluxation.  Narrowing of the disc spaces, endplate degenerative changes, and endplate   osteophytes throughout. Partial fusion of the L4-5 disc space on a   degenerative basis.  No prevertebral or paraspinal edema. No paraspinal collection.    DISC LEVELS:  T11-12: Diffuse disc osteophyte complex with facet and ligamentous   degenerative changes. Mild canal and moderate bilateral neural foramina   narrowing.  T12-L1. Small to moderate right paracentral disc extrusion with   associated inferior migration. Mild to moderate facet and ligamentous   degenerative changes. Mild canal and mild right greater than left neural   foramina narrowing.  L1-2: Mild to moderate diffuse disc bulge. Moderate facet and ligamentous   degenerative changes. Mild canal, moderate left and mild right neural   foramina narrowing.  L2-3: Small central/right paracentral disc protrusion superimposed on a   diffuse disc bulge. Moderate facet and ligamentous degenerative changes.   Moderate canal, moderate right and mild left neural foramina narrowing  L3-4: Moderate diffuse disc bulge. Severe facet and ligamentous   degenerative changes. Severe canal, severe right and mild left neural   foramina narrowing  L4-5: L4-5 disc space is partly fused on a degenerative basis. Moderate   disc osteophyte complex greatest in the central/left paracentral region.   Moderate facet and ligamentous degenerative changes. Moderate canal and   severe bilateral neural foramina narrowing  L5-S1: Mild diffuse disc bulge. Moderate facet and ligamentous   degenerative changes. Mild to moderate canal, severe left and moderate   right neural foramina narrowing.    CANAL CONTENTS:  The tip of the conus is at the L1-2 level. No compression of the distal   cord.  No epidural collection    OTHER:  The sacro-illiac joints appear intact.    IMPRESSION:  Moderate to severe multilevel degenerative changes lumbar spine greatest   at L3-4. Please see report for detail            JANE LORENZO MD; Attending Radiologist  This document has been electronically signed. Jan 19 2023  4:30PM    <   ASSESSMENT AND PLAN:     SEEN FOR AMS   LIKELY METABOLIC ENCEPHALOPATHY   HEAD TRAMA  DOUBT CVA  LEG WEAKNESS RELATED TO LR    EVERT  AVOID NARCOTIC  Physical therapy evaluation.  OOB to chair/ambulation with assistance only.  Pain is accessed and addressed.  Would continue to follow.

## 2023-01-19 NOTE — PROGRESS NOTE ADULT - NUTRITIONAL ASSESSMENT
This patient has been assessed with a concern for Malnutrition and has been determined to have a diagnosis/diagnoses of Mild protein-calorie malnutrition.    This patient is being managed with:   Diet DASH/TLC-  Sodium & Cholesterol Restricted  Entered: Jan 14 2023 10:37PM    The following pending diet order is being considered for treatment of Mild protein-calorie malnutrition:  Diet Regular-  Low Sodium  Supplement Feeding Modality:  Oral  Ensure Plus High Protein Cans or Servings Per Day:  1       Frequency:  Two Times a day  Entered: Jan 17 2023 10:52AM  

## 2023-01-19 NOTE — PROGRESS NOTE ADULT - PROBLEM/PLAN-4
Await auth for [facility]; + rotovirus in [facility] . Per GI note, no further intervention planned. Await clearance for discharge by medical team. [address]; [facility], transport forms on chart. Will follow. Mona Conway.
DISPLAY PLAN FREE TEXT

## 2023-01-19 NOTE — PROGRESS NOTE ADULT - SUBJECTIVE AND OBJECTIVE BOX
Patient is a 87y old  Female who presents with a chief complaint of UTI (19 Jan 2023 13:47)      INTERVAL HPI/OVERNIGHT EVENTS: Patient seen and examined at bedside. No overnight events occurred. Complains of right foot pain.   Mental status is unchanged. Denies fevers, chills, headache, lightheadedness, chest pain, dyspnea, abdominal pain, n/v/d/c.  Plan MRI head and lumbar spine today per neuro's request to further evaluate mental status today.   Plan for discharge to [facility] after MRI results.     MEDICATIONS  (STANDING):  amoxicillin 1000 milliGRAM(s) Oral two times a day  heparin   Injectable 5000 Unit(s) SubCutaneous every 12 hours  lidocaine   4% Patch 1 Patch Transdermal daily  metoprolol tartrate 12.5 milliGRAM(s) Oral two times a day  potassium phosphate / sodium phosphate Powder (PHOS-NaK) 1 Packet(s) Oral two times a day    MEDICATIONS  (PRN):  acetaminophen     Tablet .. 650 milliGRAM(s) Oral every 6 hours PRN Temp greater or equal to 38C (100.4F), Mild Pain (1 - 3)  aluminum hydroxide/magnesium hydroxide/simethicone Suspension 30 milliLiter(s) Oral every 4 hours PRN Dyspepsia  melatonin 3 milliGRAM(s) Oral at bedtime PRN Insomnia  methyl salicylate 15%/menthol 10% Topical Cream 1 Application(s) Topical four times a day PRN as per MD  methyl salicylate 15%/menthol 10% Topical Cream 1 Application(s) Topical four times a day PRN lower back pain  ondansetron Injectable 4 milliGRAM(s) IV Push every 8 hours PRN Nausea and/or Vomiting      Allergies    Allergy Status Unknown  No Known Allergies    Intolerances        REVIEW OF SYSTEMS:  CONSTITUTIONAL: No fever or chills  HEENT:  No headache, no sore throat  RESPIRATORY: No cough, wheezing, or shortness of breath  CARDIOVASCULAR: No chest pain, palpitations  GASTROINTESTINAL: No abd pain, nausea, vomiting, or diarrhea  GENITOURINARY: No dysuria, frequency, or hematuria  NEUROLOGICAL: no focal weakness or dizziness  MUSCULOSKELETAL: right heel pain     Vital Signs Last 24 Hrs  T(C): 36.6 (19 Jan 2023 12:52), Max: 36.8 (19 Jan 2023 05:31)  T(F): 97.9 (19 Jan 2023 12:52), Max: 98.2 (19 Jan 2023 05:31)  HR: 97 (19 Jan 2023 12:52) (73 - 97)  BP: 158/89 (19 Jan 2023 12:52) (138/82 - 158/89)  BP(mean): --  RR: 18 (19 Jan 2023 12:52) (16 - 18)  SpO2: 97% (19 Jan 2023 12:52) (94% - 97%)    Parameters below as of 19 Jan 2023 12:52  Patient On (Oxygen Delivery Method): room air          PHYSICAL EXAM:  Constitutional: NAD, awake and alert, well-developed  HEENT: Moist Mucous Membranes, Anicteric  Pulmonary: Non-labored, breath sounds are clear bilaterally, No wheezing, crackles or rhonchi  Cardiovascular: Regular, S1 and S2 nl, murmur   Gastrointestinal: Bowel Sounds present, soft, nontender.   Lymph: No lymphadenopathy. No peripheral edema.  Skin: No visible rashes or ulcers.  Psych: + short term memory loss, +confabulation   LABS:                        10.0   9.55  )-----------( 312      ( 19 Jan 2023 06:50 )             30.9     CBC Full  -  ( 19 Jan 2023 06:50 )  WBC Count : 9.55 K/uL  Hemoglobin : 10.0 g/dL  Hematocrit : 30.9 %  Platelet Count - Automated : 312 K/uL  Mean Cell Volume : 91.4 fl  Mean Cell Hemoglobin : 29.6 pg  Mean Cell Hemoglobin Concentration : 32.4 gm/dL  Auto Neutrophil # : x  Auto Lymphocyte # : x  Auto Monocyte # : x  Auto Eosinophil # : x  Auto Basophil # : x  Auto Neutrophil % : x  Auto Lymphocyte % : x  Auto Monocyte % : x  Auto Eosinophil % : x  Auto Basophil % : x    19 Jan 2023 06:50    138    |  107    |  17     ----------------------------<  87     3.4     |  25     |  0.87     Ca    8.1        19 Jan 2023 06:50    TPro  6.2    /  Alb  2.3    /  TBili  0.3    /  DBili  x      /  AST  23     /  ALT  24     /  AlkPhos  57     19 Jan 2023 06:50        CAPILLARY BLOOD GLUCOSE            Culture - Blood (collected 01-16-23 @ 06:28)  Source: .Blood Blood  Preliminary Report (01-17-23 @ 12:01):    No growth to date.    Culture - Blood (collected 01-16-23 @ 06:25)  Source: .Blood Blood  Preliminary Report (01-17-23 @ 12:01):    No growth to date.    Culture - Blood (collected 01-14-23 @ 21:30)  Source: .Blood Blood-Peripheral  Gram Stain (01-15-23 @ 11:49):    Growth in aerobic bottle: Gram Negative Rods    Growth in anaerobic bottle: Gram Negative Rods  Final Report (01-17-23 @ 07:59):    Growth in aerobic and anaerobic bottles: Escherichia coli    ***Blood Panel PCR results on this specimen are available    approximately 3 hours after the Gram stain result.***    Gram stain, PCR, and/or culture results may not always    correspond due to difference in methodologies.    ************************************************************    This PCR assay was performed by multiplex PCR. This    Assay tests for 66 bacterial and resistance gene targets.    Please refer to the [facility] test directory    at [email or web address] for details.  Organism: Blood Culture PCR  Escherichia coli (01-17-23 @ 07:59)  Organism: Escherichia coli (01-17-23 @ 07:59)      -  Amikacin: S <=16      -  Ampicillin: S <=8 These ampicillin results predict results for amoxicillin      -  Ampicillin/Sulbactam: S <=4/2 Enterobacter, Klebsiella aerogenes, Citrobacter, and Serratia may develop resistance during prolonged therapy (3-4 days)      -  Aztreonam: S <=4      -  Cefazolin: S <=2 Enterobacter, Klebsiella aerogenes, Citrobacter, and Serratia may develop resistance during prolonged therapy (3-4 days)      -  Cefepime: S <=2      -  Cefoxitin: S <=8      -  Ceftriaxone: S <=1 Enterobacter, Klebsiella aerogenes, Citrobacter, and Serratia may develop resistance during prolonged therapy      -  Ciprofloxacin: S <=0.25      -  Ertapenem: S <=0.5      -  Gentamicin: S <=2      -  Imipenem: S <=1      -  Levofloxacin: S <=0.5      -  Meropenem: S <=1      -  Piperacillin/Tazobactam: S <=8      -  Tobramycin: S <=2      -  Trimethoprim/Sulfamethoxazole: S <=0.5/9.5      Method Type: CHRISTIANO  Organism: Blood Culture PCR (01-17-23 @ 07:59)      -  Escherichia coli: Detec      Method Type: PCR    Culture - Blood (collected 01-14-23 @ 21:26)  Source: .Blood Blood-Peripheral  Gram Stain (01-15-23 @ 12:21):    Growth in anaerobic bottle: Gram Negative Rods    Growth in aerobic bottle: Gram Negative Rods  Final Report (01-17-23 @ 07:59):    Growth in aerobic and anaerobic bottles: Escherichia coli    See previous culture [id number]    Culture - Urine (collected 01-14-23 @ 20:55)  Source: Clean Catch Clean Catch (Midstream)  Final Report (01-18-23 @ 12:44):    >100,000 CFU/ml Escherichia coli  Organism: Escherichia coli (01-18-23 @ 12:44)  Organism: Escherichia coli (01-18-23 @ 12:44)      -  Amikacin: S <=16      -  Amoxicillin/Clavulanic Acid: S <=8/4      -  Ampicillin: S <=8 These ampicillin results predict results for amoxicillin      -  Ampicillin/Sulbactam: S <=4/2 Enterobacter, Klebsiella aerogenes, Citrobacter, and Serratia may develop resistance during prolonged therapy (3-4 days)      -  Aztreonam: S <=4      -  Cefazolin: S <=2 For uncomplicated UTI with K. pneumoniae, E. coli, or P. mirablis: CHRISTIANO <=16 is sensitive and CHRISTIANO >=32 is resistant. This also predicts results for oral agents cefaclor, cefdinir, cefpodoxime, cefprozil, cefuroxime axetil, cephalexin and locarbef for uncomplicated UTI. Note that some isolates may be susceptible to these agents while testing resistant to cefazolin.      -  Cefepime: S <=2      -  Cefoxitin: S <=8      -  Ceftriaxone: S <=1 Enterobacter, Klebsiella aerogenes, Citrobacter, and Serratia may develop resistance during prolonged therapy      -  Cefuroxime: S <=4      -  Ciprofloxacin: S <=0.25      -  Ertapenem: S <=0.5      -  Gentamicin: S <=2      -  Imipenem: S <=1      -  Levofloxacin: S <=0.5      -  Meropenem: S <=1      -  Nitrofurantoin: S <=32 Should not be used to treat pyelonephritis      -  Piperacillin/Tazobactam: S <=8      -  Tobramycin: S <=2      -  Trimethoprim/Sulfamethoxazole: S <=0.5/9.5      Method Type: CHRISTIANO        RADIOLOGY & ADDITIONAL TESTS:    Personally reviewed.     Consultant(s) Notes Reviewed:  [x] YES  [ ] NO

## 2023-01-19 NOTE — PROGRESS NOTE ADULT - SUBJECTIVE AND OBJECTIVE BOX
[facility] DIVISION of INFECTIOUS DISEASE  Shorty Nolasco MD PhD, Jenna Hayward MD, Abbie Ahmadi MD, Shayy Philip MD, Ralph Lares MD  and providing coverage with Daisy Carrington MD  Providing Infectious Disease Consultations at [facility], [facility], [facility]'s    Office# [phone] to schedule follow up appointments  Answering Service for urgent calls or New Consults [phone]  Cell# to text for urgent issues Shorty Nolasco [phone]     infectious diseases progress note:    CHAVA JOSEPH is a 87y y. o. Female patient    Overnight and events of the last 24hrs reviewed    Allergies    Allergy Status Unknown  No Known Allergies    Intolerances        ANTIBIOTICS/RELEVANT:  antimicrobials  amoxicillin 1000 milliGRAM(s) Oral two times a day    immunologic:    OTHER:  acetaminophen     Tablet .. 650 milliGRAM(s) Oral every 6 hours PRN  aluminum hydroxide/magnesium hydroxide/simethicone Suspension 30 milliLiter(s) Oral every 4 hours PRN  heparin   Injectable 5000 Unit(s) SubCutaneous every 12 hours  lidocaine   4% Patch 1 Patch Transdermal daily  melatonin 3 milliGRAM(s) Oral at bedtime PRN  methyl salicylate 15%/menthol 10% Topical Cream 1 Application(s) Topical four times a day PRN  methyl salicylate 15%/menthol 10% Topical Cream 1 Application(s) Topical four times a day PRN  metoprolol tartrate 12.5 milliGRAM(s) Oral two times a day  ondansetron Injectable 4 milliGRAM(s) IV Push every 8 hours PRN  potassium phosphate / sodium phosphate Powder (PHOS-NaK) 1 Packet(s) Oral two times a day      Objective:  Vital Signs Last 24 Hrs  T(C): 36.6 (19 Jan 2023 12:52), Max: 36.8 (19 Jan 2023 05:31)  T(F): 97.9 (19 Jan 2023 12:52), Max: 98.2 (19 Jan 2023 05:31)  HR: 97 (19 Jan 2023 12:52) (73 - 97)  BP: 158/89 (19 Jan 2023 12:52) (138/82 - 158/89)  BP(mean): --  RR: 18 (19 Jan 2023 12:52) (16 - 18)  SpO2: 97% (19 Jan 2023 12:52) (94% - 97%)    Parameters below as of 19 Jan 2023 12:52  Patient On (Oxygen Delivery Method): room air        T(C): 36.6 (01-19-23 @ 12:52), Max: 36.8 (01-18-23 @ 06:17)  T(C): 36.6 (01-19-23 @ 12:52), Max: 36.8 (01-16-23 @ 21:35)  T(C): 36.6 (01-19-23 @ 12:52), Max: 37.1 (01-15-23 @ 21:07)    PHYSICAL EXAM:  HEENT: NC atraumatic  Neck: supple  Respiratory: no accessory muscle use, breathing comfortably  Cardiovascular: distant  Gastrointestinal: normal appearing, nondistended  Extremities: no clubbing, no cyanosis,        LABS:                          10.0   9.55  )-----------( 312      ( 19 Jan 2023 06:50 )             30.9       WBC  9.55 01-19 @ 06:50  10.25 01-18 @ 06:35  13.39 01-17 @ 07:26  20.47 01-16 @ 06:25  26.99 01-15 @ 08:34  25.44 01-14 @ 19:00      01-19    138  |  107  |  17  ----------------------------<  87  3.4<L>   |  25  |  0.87    Ca    8.1<L>      19 Jan 2023 06:50  Phos  2.2     01-18  Mg     2.3     01-18    TPro  6.2  /  Alb  2.3<L>  /  TBili  0.3  /  DBili  x   /  AST  23  /  ALT  24  /  AlkPhos  57  01-19      Creatinine, Serum: 0.87 mg/dL (01-19-23 @ 06:50)  Creatinine, Serum: 0.92 mg/dL (01-18-23 @ 06:35)  Creatinine, Serum: 1.10 mg/dL (01-17-23 @ 07:26)  Creatinine, Serum: 1.10 mg/dL (01-16-23 @ 06:25)  Creatinine, Serum: 1.50 mg/dL (01-15-23 @ 08:34)  Creatinine, Serum: 1.80 mg/dL (01-14-23 @ 19:00)                INFLAMMATORY MARKERS      MICROBIOLOGY:              RADIOLOGY & ADDITIONAL STUDIES:

## 2023-01-19 NOTE — PROGRESS NOTE ADULT - PROBLEM SELECTOR PROBLEM 2
Metabolic encephalopathy
LEIGH (acute kidney injury)
Metabolic encephalopathy

## 2023-01-19 NOTE — SOCIAL WORK PROGRESS NOTE - NSSWPROGRESSNOTE_GEN_ALL_CORE
JOSELUIS spoke with MD who is anticipating pt to be medically ready for DC tomorrow 1/20 pending MRI results. JOSELUIS spoke with [facility] who can accept pt for tomorrow 1/20. JOSELUIS spoke with pt son Indra who is in agreement with DC plan. JOSELUIS arranged for [facility] to p/u pt at 2PM tomorrow through Ambul. JOSELUIS to confirm DC with MD and pt son tomorrow. JOSELUIS to remain available for any needs.

## 2023-01-19 NOTE — PROGRESS NOTE ADULT - PROBLEM SELECTOR PLAN 7
Denies any pain today   Pt had unwitnessed fall on day prior to admission  - CT showing age indeterminate acetabular fracture  - No acute orthopedic surgical intervention at this time  - XR pelvis, Judet views, inlet/outlet rec but patient refusing and says no pain  - To consider MRI L hip if patient pain persists and patient not able to ambulate   - Ortho consulted, recs appreciated  - PT eval, recommends [facility]

## 2023-01-19 NOTE — PROGRESS NOTE ADULT - ATTENDING COMMENTS
The patient was seen and evaluated independently by the attending physician.  - I have personally reviewed the pt's labs, imaging, micro data and consultant recommendations.     88yo female with pmhx htn who presents 1 day after an unwitnessed fall admitted for severe sepsis with gram negative bacteremia metabolic encephalopathy admitted for a UTI, age indeterminate fracture of acetabulum and tachycardia    #severe sepsis with E. coli bacteremia and metabolic encephalopathy 2/2 E. coli UTI  #LEIGH 2/2 rhabdo and sepsis  #hip pain- ortho consulted  #anemia, stable    -MRI of brain and lumbar spine planned for today  -leukocytosis and renal indices improving  -change to po - spoke w/ ID  -serial mental status assessments  -f/u repeat blood cultures - ngtd from 1/16/23  -spoke w/ family

## 2023-01-19 NOTE — DISCHARGE NOTE NURSING/CASE MANAGEMENT/SOCIAL WORK - NSDCVIVACCINE_GEN_ALL_CORE_FT
Tdap; 24-Mar-2019 16:36; Yara Rowe (HIRO); popchips; [id number] (Exp. Date: 26-Feb-2021); IntraMuscular; Deltoid Left.; 0.5 milliLiter(s); VIS (VIS Published: 09-May-2013, VIS Presented: 24-Mar-2019);

## 2023-01-20 VITALS
DIASTOLIC BLOOD PRESSURE: 87 MMHG | TEMPERATURE: 98 F | OXYGEN SATURATION: 94 % | SYSTOLIC BLOOD PRESSURE: 147 MMHG | HEART RATE: 88 BPM | RESPIRATION RATE: 18 BRPM

## 2023-01-20 LAB
ALBUMIN SERPL ELPH-MCNC: 2.3 G/DL — LOW (ref 3.3–5)
ALP SERPL-CCNC: 63 U/L — SIGNIFICANT CHANGE UP (ref 40–120)
ALT FLD-CCNC: 29 U/L — SIGNIFICANT CHANGE UP (ref 12–78)
ANION GAP SERPL CALC-SCNC: 6 MMOL/L — SIGNIFICANT CHANGE UP (ref 5–17)
AST SERPL-CCNC: 41 U/L — HIGH (ref 15–37)
BILIRUB SERPL-MCNC: 0.4 MG/DL — SIGNIFICANT CHANGE UP (ref 0.2–1.2)
BUN SERPL-MCNC: 16 MG/DL — SIGNIFICANT CHANGE UP (ref 7–23)
CALCIUM SERPL-MCNC: 8.3 MG/DL — LOW (ref 8.5–10.1)
CHLORIDE SERPL-SCNC: 109 MMOL/L — HIGH (ref 96–108)
CO2 SERPL-SCNC: 24 MMOL/L — SIGNIFICANT CHANGE UP (ref 22–31)
CREAT SERPL-MCNC: 0.82 MG/DL — SIGNIFICANT CHANGE UP (ref 0.5–1.3)
EGFR: 70 ML/MIN/1.73M2 — SIGNIFICANT CHANGE UP
GLUCOSE SERPL-MCNC: 84 MG/DL — SIGNIFICANT CHANGE UP (ref 70–99)
HCT VFR BLD CALC: 35 % — SIGNIFICANT CHANGE UP (ref 34.5–45)
HGB BLD-MCNC: 11.2 G/DL — LOW (ref 11.5–15.5)
MCHC RBC-ENTMCNC: 30.1 PG — SIGNIFICANT CHANGE UP (ref 27–34)
MCHC RBC-ENTMCNC: 32 GM/DL — SIGNIFICANT CHANGE UP (ref 32–36)
MCV RBC AUTO: 94.1 FL — SIGNIFICANT CHANGE UP (ref 80–100)
NRBC # BLD: 0 /100 WBCS — SIGNIFICANT CHANGE UP (ref 0–0)
PLATELET # BLD AUTO: 345 K/UL — SIGNIFICANT CHANGE UP (ref 150–400)
POTASSIUM SERPL-MCNC: 3.5 MMOL/L — SIGNIFICANT CHANGE UP (ref 3.5–5.3)
POTASSIUM SERPL-SCNC: 3.5 MMOL/L — SIGNIFICANT CHANGE UP (ref 3.5–5.3)
PROT SERPL-MCNC: 6.5 G/DL — SIGNIFICANT CHANGE UP (ref 6–8.3)
RBC # BLD: 3.72 M/UL — LOW (ref 3.8–5.2)
RBC # FLD: 14.5 % — SIGNIFICANT CHANGE UP (ref 10.3–14.5)
SODIUM SERPL-SCNC: 139 MMOL/L — SIGNIFICANT CHANGE UP (ref 135–145)
WBC # BLD: 11.64 K/UL — HIGH (ref 3.8–10.5)
WBC # FLD AUTO: 11.64 K/UL — HIGH (ref 3.8–10.5)

## 2023-01-20 PROCEDURE — 82962 GLUCOSE BLOOD TEST: CPT

## 2023-01-20 PROCEDURE — 83735 ASSAY OF MAGNESIUM: CPT

## 2023-01-20 PROCEDURE — 84443 ASSAY THYROID STIM HORMONE: CPT

## 2023-01-20 PROCEDURE — 83605 ASSAY OF LACTIC ACID: CPT

## 2023-01-20 PROCEDURE — 97530 THERAPEUTIC ACTIVITIES: CPT

## 2023-01-20 PROCEDURE — 87086 URINE CULTURE/COLONY COUNT: CPT

## 2023-01-20 PROCEDURE — 82550 ASSAY OF CK (CPK): CPT

## 2023-01-20 PROCEDURE — 87040 BLOOD CULTURE FOR BACTERIA: CPT

## 2023-01-20 PROCEDURE — 81001 URINALYSIS AUTO W/SCOPE: CPT

## 2023-01-20 PROCEDURE — 82140 ASSAY OF AMMONIA: CPT

## 2023-01-20 PROCEDURE — 82746 ASSAY OF FOLIC ACID SERUM: CPT

## 2023-01-20 PROCEDURE — 93306 TTE W/DOPPLER COMPLETE: CPT

## 2023-01-20 PROCEDURE — U0003: CPT

## 2023-01-20 PROCEDURE — 85730 THROMBOPLASTIN TIME PARTIAL: CPT

## 2023-01-20 PROCEDURE — 72148 MRI LUMBAR SPINE W/O DYE: CPT

## 2023-01-20 PROCEDURE — 85610 PROTHROMBIN TIME: CPT

## 2023-01-20 PROCEDURE — 80053 COMPREHEN METABOLIC PANEL: CPT

## 2023-01-20 PROCEDURE — 73502 X-RAY EXAM HIP UNI 2-3 VIEWS: CPT

## 2023-01-20 PROCEDURE — 99285 EMERGENCY DEPT VISIT HI MDM: CPT | Mod: 25

## 2023-01-20 PROCEDURE — 85025 COMPLETE CBC W/AUTO DIFF WBC: CPT

## 2023-01-20 PROCEDURE — 72128 CT CHEST SPINE W/O DYE: CPT | Mod: MA

## 2023-01-20 PROCEDURE — 96361 HYDRATE IV INFUSION ADD-ON: CPT

## 2023-01-20 PROCEDURE — 74176 CT ABD & PELVIS W/O CONTRAST: CPT

## 2023-01-20 PROCEDURE — 82607 VITAMIN B-12: CPT

## 2023-01-20 PROCEDURE — 87186 SC STD MICRODIL/AGAR DIL: CPT

## 2023-01-20 PROCEDURE — 72100 X-RAY EXAM L-S SPINE 2/3 VWS: CPT

## 2023-01-20 PROCEDURE — 87637 SARSCOV2&INF A&B&RSV AMP PRB: CPT

## 2023-01-20 PROCEDURE — 84100 ASSAY OF PHOSPHORUS: CPT

## 2023-01-20 PROCEDURE — 76376 3D RENDER W/INTRP POSTPROCES: CPT

## 2023-01-20 PROCEDURE — 99232 SBSQ HOSP IP/OBS MODERATE 35: CPT

## 2023-01-20 PROCEDURE — 72125 CT NECK SPINE W/O DYE: CPT | Mod: MA

## 2023-01-20 PROCEDURE — 96365 THER/PROPH/DIAG IV INF INIT: CPT

## 2023-01-20 PROCEDURE — 93005 ELECTROCARDIOGRAM TRACING: CPT

## 2023-01-20 PROCEDURE — U0005: CPT

## 2023-01-20 PROCEDURE — 85027 COMPLETE CBC AUTOMATED: CPT

## 2023-01-20 PROCEDURE — 72192 CT PELVIS W/O DYE: CPT | Mod: MA

## 2023-01-20 PROCEDURE — 97116 GAIT TRAINING THERAPY: CPT

## 2023-01-20 PROCEDURE — 99239 HOSP IP/OBS DSCHRG MGMT >30: CPT

## 2023-01-20 PROCEDURE — 36415 COLL VENOUS BLD VENIPUNCTURE: CPT

## 2023-01-20 PROCEDURE — 71045 X-RAY EXAM CHEST 1 VIEW: CPT

## 2023-01-20 PROCEDURE — 87150 DNA/RNA AMPLIFIED PROBE: CPT

## 2023-01-20 PROCEDURE — 97162 PT EVAL MOD COMPLEX 30 MIN: CPT

## 2023-01-20 PROCEDURE — 70450 CT HEAD/BRAIN W/O DYE: CPT | Mod: MA

## 2023-01-20 PROCEDURE — 73562 X-RAY EXAM OF KNEE 3: CPT

## 2023-01-20 PROCEDURE — 80048 BASIC METABOLIC PNL TOTAL CA: CPT

## 2023-01-20 PROCEDURE — 70551 MRI BRAIN STEM W/O DYE: CPT

## 2023-01-20 RX ORDER — METOPROLOL TARTRATE 50 MG
12.5 TABLET ORAL
Qty: 0 | Refills: 0 | DISCHARGE
Start: 2023-01-20

## 2023-01-20 RX ORDER — LIDOCAINE 4 G/100G
1 CREAM TOPICAL
Qty: 0 | Refills: 0 | DISCHARGE
Start: 2023-01-20

## 2023-01-20 RX ORDER — MENTHOL AND METHYL SALICYLATE 10; 30 G/100G; G/100G
1 CREAM TOPICAL
Qty: 0 | Refills: 0 | DISCHARGE
Start: 2023-01-20

## 2023-01-20 RX ADMIN — Medication 1000 MILLIGRAM(S): at 06:17

## 2023-01-20 RX ADMIN — HEPARIN SODIUM 5000 UNIT(S): 5000 INJECTION INTRAVENOUS; SUBCUTANEOUS at 06:17

## 2023-01-20 RX ADMIN — Medication 650 MILLIGRAM(S): at 09:28

## 2023-01-20 RX ADMIN — Medication 1 PACKET(S): at 06:17

## 2023-01-20 RX ADMIN — Medication 12.5 MILLIGRAM(S): at 06:17

## 2023-01-20 RX ADMIN — LIDOCAINE 1 PATCH: 4 CREAM TOPICAL at 11:36

## 2023-01-20 RX ADMIN — LIDOCAINE 1 PATCH: 4 CREAM TOPICAL at 04:33

## 2023-01-20 NOTE — SOCIAL WORK PROGRESS NOTE - NSSWPROGRESSNOTE_GEN_ALL_CORE
pt for dc today to [facility]. Ambulance set up for 5:30 pm. sw spoke to son whom is in agreement and to meet pt at [facility] later today. No further sw services indicated at this time. Plan:[facility] at [facility].

## 2023-01-20 NOTE — CHART NOTE - NSCHARTNOTEFT_GEN_A_CORE
Assessment: Pt seen for nutrition follow-up. Chart reviewed, hospital course noted.    Brief hx: Pt is a 88 yo female with pmhx htn who presents 1 day after an unwitnessed fall admitted for severe sepsis with gram negative bacteremia metabolic encephalopathy admitted for a UTI, age indeterminate fracture of acetabulum, and tachycardia.     Visited pt this morning. Pt OOB to chair during visit of visit. Pt reports good appetite/intake. Observed breakfast tray, pt consumed ~75% of meal. PO intakes 26-50% per nursing documentation. Pt feeds self, minimal assistance with tray set-up. Denies chewing/swallowing difficulties. No food allergies. Denies N/V/D/C. +BM 1/19.     Factors impacting intake: [X] none [ ] nausea  [ ] vomiting [ ] diarrhea [ ] constipation  [ ]chewing problems [ ] swallowing issues  [ ] other:     Diet Prescription: Diet, DASH/TLC:   Sodium & Cholesterol Restricted (01-14-23 @ 22:38)    Intake: fair    Current Weight: Weight (kg): 56.7 (01-14 @ 17:39), 125# 1/18 (no edema noted)  % Weight Change    Pertinent Medications: MEDICATIONS  (STANDING):  amoxicillin 1000 milliGRAM(s) Oral two times a day  heparin   Injectable 5000 Unit(s) SubCutaneous every 12 hours  lidocaine   4% Patch 1 Patch Transdermal daily  metoprolol tartrate 12.5 milliGRAM(s) Oral two times a day  potassium phosphate / sodium phosphate Powder (PHOS-NaK) 1 Packet(s) Oral two times a day    MEDICATIONS  (PRN):  acetaminophen     Tablet .. 650 milliGRAM(s) Oral every 6 hours PRN Temp greater or equal to 38C (100.4F), Mild Pain (1 - 3)  aluminum hydroxide/magnesium hydroxide/simethicone Suspension 30 milliLiter(s) Oral every 4 hours PRN Dyspepsia  melatonin 3 milliGRAM(s) Oral at bedtime PRN Insomnia  methyl salicylate 15%/menthol 10% Topical Cream 1 Application(s) Topical four times a day PRN as per MD  methyl salicylate 15%/menthol 10% Topical Cream 1 Application(s) Topical four times a day PRN lower back pain  ondansetron Injectable 4 milliGRAM(s) IV Push every 8 hours PRN Nausea and/or Vomiting    Pertinent Labs: 01-20 Na139 mmol/L Glu 84 mg/dL K+ 3.5 mmol/L Cr  0.82 mg/dL BUN 16 mg/dL 01-18 Phos 2.2 mg/dL<L> 01-20 Alb 2.3 g/dL<L>    Skin: stage II to sacrum    Estimated Needs:   [X] no change since previous assessment: based on 125#/56.6kg  25-30kcal/kg (1415-1698kcal)  1.2-1.4g pro/kg (68-79gm protein)  [ ] recalculated:     Previous Nutrition Diagnosis:   [ ] Inadequate Energy Intake [ ]Inadequate Oral Intake [ ] Excessive Energy Intake   [ ] Underweight [ ] Increased Nutrient Needs [ ] Overweight/Obesity   [ ] Altered GI Function [ ] Unintended Weight Loss [ ] Food & Nutrition Related Knowledge Deficit [X] Malnutrition (mild/chronic)    Nutrition Diagnosis is [X] ongoing  [ ] resolved [ ] not applicable     New Nutrition Diagnosis: [X] not applicable     Interventions:   Recommend  [ ] Change Diet To:  [X] Nutrition Supplement: ensure plus HP bid (350kcal, 20gm protein per 8 fl oz serving)  [ ] Nutrition Support  [X] Other: Recommend regular, low sodium diet; honor food preferences as able to optimize po intake and tolerance  Recommend assistance/encouragement at meal times as needed  Recommend MVI daily and vitamin C 500mg BID    Monitoring and Evaluation:   [X] PO intake [ x ] Tolerance to diet prescription [ x ] weights [ x ] labs[ x ] follow up per protocol  [X] other: s/s GI distress, bowel function, skin integrity/ edema
Called by RN for patient with unsustained tachycardia up to 150s.  Patient seen and examined at bedside.  Patient denies any acute complaints. Says she feels perfectly fine.  Denies sob, chest pain, fevers, chills, nausea, vomiting, diarrhea.      T(C): 37.1 (01-15-23 @ 03:06), Max: 37.1 (01-15-23 @ 03:06)  HR: 109 (01-15-23 @ 03:06) (95 - 109)  BP: 102/65 (01-15-23 @ 03:06) (99/62 - 102/70)  RR: 17 (01-15-23 @ 03:06) (16 - 18)  SpO2: 96% (01-15-23 @ 03:06) (94% - 97%)  Wt(kg): --    Physical Exam:  Gen: well appearing, NAD  HEENT: NCAT, EOMI b/l, no conjunctival erythema  Cardio: tachycardia, +s1s2, no murmurs, rubs, or gallops  Pulm: CTA b/l, no wheezes, rales or rhonchi  Abdomen: soft, nontender, nondistended, +BS x4 quadrants, no guarding  Extremities: no clubbing, cyanosis or edema  Neuro: AAOx3, moving all 4 extremities, sensation intact  Skin: warm and dry    Assessment/Plan  88yo Female with PMH of htn who presents 1 day after an unwitnessed fall. She has had episodes of confusion and is being admitted for a UTI.   Called to evaluate for unsustained tachycardia.    Tachycardia- Patient w/ HR ranging 90-150s likely secondary to infection  - Repeat EKG shows SR Tachycardia w/ pac's   - VSS , repeat STAT vitals  - Continue NS bolus  - Will continue to monitor  - RN to call with any changes
Patient refusing echo and CT abd/Pelvis.   CT tech will attempt to convince patient to have imaging done.
The patient was seen and examined on the day of discharge by the attending physician. Pt stable for discharge. Please see discharge note for additional information regarding the hospital course and the day of discharge.

## 2023-01-20 NOTE — PROGRESS NOTE ADULT - ASSESSMENT
88 yo female with pmhx htn who presents 1 day after an unwitnessed fall, consulted for tachycardia.      in progress    s/p Fall/Tachycardia  - a/w rhabdo , sepsis 2/2 UTI with metabolic encephalopathy now improving  - HR improved 70-80's per flow sheet, now off tele   - BP stable 143/81  - Continue BB.  Hold home anti-HTN meds to allow for BB administration  - Pain control per Primary  - TTE (1/13/2023) Normal LVEF of 65%. Trace physiologic MR and TR. Trace pericardial effusion.  - euvolemic on exam. No O2 requirement   - Monitor and replete Lytes. Keep K > 4 and Mg > 2  Tana Cage FNP-C  Cardiology NP  [facility] [phone]     87 year old  female with pmhx htn who presents 1 day after an unwitnessed fall, consulted for tachycardia.      s/p Fall/Tachycardia  - a/w rhabdo , sepsis 2/2 UTI with metabolic encephalopathy now improving  - HR improved 70-80's per flow sheet, now off tele   - BP stable 143/81  - Continue BB.  - TTE (1/13/2023) Normal LVEF of 65%. Trace physiologic MR and TR. Trace pericardial effusion.  - euvolemic on exam. No O2 requirement   stable from cv standpoint for dc   - Monitor and replete Lytes. Keep K > 4 and Mg > 2  Tana Cage FNP-C  Cardiology NP  [facility] [phone]
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
## 2023-01-20 NOTE — PROGRESS NOTE ADULT - SUBJECTIVE AND OBJECTIVE BOX
[facility] -- Jeremi Perkins Pannella, Patel, Savella [facility]  Office # [phone]      Follow Up:  tachycardia     Subjective/Observations:   No events overnight resting comfortably in bed.  No complaints of chest pain, dyspnea, or palpitations reported. No signs of orthopnea or PND.      REVIEW OF SYSTEMS: All other review of systems is negative unless indicated above    PAST MEDICAL & SURGICAL HISTORY:  HTN (hypertension)      Hypertension          MEDICATIONS  (STANDING):  amoxicillin 1000 milliGRAM(s) Oral two times a day  heparin   Injectable 5000 Unit(s) SubCutaneous every 12 hours  lidocaine   4% Patch 1 Patch Transdermal daily  metoprolol tartrate 12.5 milliGRAM(s) Oral two times a day  potassium phosphate / sodium phosphate Powder (PHOS-NaK) 1 Packet(s) Oral two times a day    MEDICATIONS  (PRN):  acetaminophen     Tablet .. 650 milliGRAM(s) Oral every 6 hours PRN Temp greater or equal to 38C (100.4F), Mild Pain (1 - 3)  aluminum hydroxide/magnesium hydroxide/simethicone Suspension 30 milliLiter(s) Oral every 4 hours PRN Dyspepsia  melatonin 3 milliGRAM(s) Oral at bedtime PRN Insomnia  methyl salicylate 15%/menthol 10% Topical Cream 1 Application(s) Topical four times a day PRN as per MD  methyl salicylate 15%/menthol 10% Topical Cream 1 Application(s) Topical four times a day PRN lower back pain  ondansetron Injectable 4 milliGRAM(s) IV Push every 8 hours PRN Nausea and/or Vomiting      Allergies    Allergy Status Unknown  No Known Allergies    Intolerances        Vital Signs Last 24 Hrs  T(C): 37.1 (2023 05:30), Max: 37.1 (2023 05:30)  T(F): 98.7 (2023 05:30), Max: 98.7 (2023 05:30)  HR: 86 (2023 05:30) (86 - 97)  BP: 143/81 (2023 05:30) (143/81 - 167/82)  BP(mean): --  RR: 17 (2023 05:30) (17 - 18)  SpO2: 97% (2023 05:30) (95% - 97%)    Parameters below as of 2023 05:30  Patient On (Oxygen Delivery Method): room air        I&O's Summary    2023 07:01  -  2023 07:00  --------------------------------------------------------  IN: 240 mL / OUT: 2 mL / NET: 238 mL          PHYSICAL EXAM:  TELE: not on tele   Constitutional: NAD, awake and alert, well-developed  HEENT: Moist Mucous Membranes, Anicteric  Pulmonary: Non-labored, breath sounds are clear bilaterally, No wheezing, crackles or rhonchi  Cardiovascular: Regular, S1 and S2 nl, murmur   Gastrointestinal: Bowel Sounds present, soft, nontender.   Lymph: No lymphadenopathy. No peripheral edema.  Skin: No visible rashes or ulcers.  Psych:  Mood & affect appropriate    LABS: All Labs Reviewed:                        10.0   9.55  )-----------( 312      ( 2023 06:50 )             30.9                         10.2   10.25 )-----------( 285      ( 2023 06:35 )             32.3     2023 06:50    138    |  107    |  17     ----------------------------<  87     3.4     |  25     |  0.87   2023 06:35    139    |  107    |  24     ----------------------------<  87     3.5     |  25     |  0.92     Ca    8.1        2023 06:50  Ca    8.3        2023 06:35  Phos  2.2       2023 06:35  Mg     2.3       2023 06:35    TPro  6.2    /  Alb  2.3    /  TBili  0.3    /  DBili  x      /  AST  23     /  ALT  24     /  AlkPhos  57     2023 06:50  TPro  6.2    /  Alb  2.2    /  TBili  0.4    /  DBili  x      /  AST  23     /  ALT  23     /  AlkPhos  61     2023 06:35             EC Lead ECG:   Ventricular Rate 82 BPM    Atrial Rate 82 BPM    P-R Interval 128 ms    QRS Duration 92 ms    Q-T Interval 386 ms    QTC Calculation(Bazett) 450 ms    P Axis 40 degrees    R Axis -26 degrees    T Axis 25 degrees    Diagnosis Line Sinus rhythm with premature supraventricular complexes  Otherwise normal ECG  When compared with ECG of 15-VIANEY-2023 03:26,  No significant change was found  Confirmed by Lela Soto ([id number]) on 2023 9:09:57 AM (01-15-23 @ 09:34)      ACC: [id number] EXAM:  ECHO TTE WO CON COMP W DOPP                          PROCEDURE DATE:  2023          INTERPRETATION:  INDICATION: Abnormal EKG  Sonographer KL    Blood Pressure 136/81    Height 158 cm     Weight 57 kg       BSA 1.6 sq m    Dimensions:  LA 2.3       Normal Values: 2.0 - 4.0 cm  Ao 3.3        Normal Values: 2.0 - 3.8 cm  SEPTUM 1.2       Normal Values: 0.6 - 1.2 cm  PWT 1.1       Normal Values: 0.6 - 1.1 cm  LVIDd 4.0         Normal Values: 3.0 - 5.6 cm  LVIDs 2.4  Normal Values: 1.8 - 4.0 cm      OBSERVATIONS:  Mitral Valve: normal, trace physiologic MR.  Aortic Valve/Aorta: Sclerotic trileaflet aortic valve. Trace to mild AI  Tricuspid Valve: normal with trace TR.  Pulmonic Valve: Not well-visualized  Left Atrium: normal  Right Atrium: Not well-visualized  Left Ventricle: normal LV size and systolic function, estimated LVEF of   65%.  Right Ventricle: Grossly normal size and systolic function.  Pericardium: Trace pericardial effusion.      IMPRESSION:  Normal left ventricular internal dimensions and systolic function,   estimated LVEF of 65%.  Grossly normal RV size and systolic function.  Sclerotic trileaflet aortic valve, trace to mild AI.  Trace physiologic MR and TR.  Trace pericardial effusion.           [facility] -- Jeremi Perkins Pannella, Patel, Savella [facility]  Office # [phone]      Follow Up:  tachycardia     Subjective/Observations:   No events overnight resting comfortably in bed.  No complaints of chest pain, dyspnea, or palpitations reported. No signs of orthopnea or PND.  sitting in chair on room air eating breakfast offering no complaints     REVIEW OF SYSTEMS: All other review of systems is negative unless indicated above    PAST MEDICAL & SURGICAL HISTORY:  HTN (hypertension)      Hypertension          MEDICATIONS  (STANDING):  amoxicillin 1000 milliGRAM(s) Oral two times a day  heparin   Injectable 5000 Unit(s) SubCutaneous every 12 hours  lidocaine   4% Patch 1 Patch Transdermal daily  metoprolol tartrate 12.5 milliGRAM(s) Oral two times a day  potassium phosphate / sodium phosphate Powder (PHOS-NaK) 1 Packet(s) Oral two times a day    MEDICATIONS  (PRN):  acetaminophen     Tablet .. 650 milliGRAM(s) Oral every 6 hours PRN Temp greater or equal to 38C (100.4F), Mild Pain (1 - 3)  aluminum hydroxide/magnesium hydroxide/simethicone Suspension 30 milliLiter(s) Oral every 4 hours PRN Dyspepsia  melatonin 3 milliGRAM(s) Oral at bedtime PRN Insomnia  methyl salicylate 15%/menthol 10% Topical Cream 1 Application(s) Topical four times a day PRN as per MD  methyl salicylate 15%/menthol 10% Topical Cream 1 Application(s) Topical four times a day PRN lower back pain  ondansetron Injectable 4 milliGRAM(s) IV Push every 8 hours PRN Nausea and/or Vomiting      Allergies    Allergy Status Unknown  No Known Allergies    Intolerances        Vital Signs Last 24 Hrs  T(C): 37.1 (2023 05:30), Max: 37.1 (2023 05:30)  T(F): 98.7 (2023 05:30), Max: 98.7 (2023 05:30)  HR: 86 (2023 05:30) (86 - 97)  BP: 143/81 (2023 05:30) (143/81 - 167/82)  BP(mean): --  RR: 17 (2023 05:30) (17 - 18)  SpO2: 97% (2023 05:30) (95% - 97%)    Parameters below as of 2023 05:30  Patient On (Oxygen Delivery Method): room air        I&O's Summary    2023 07:01  -  2023 07:00  --------------------------------------------------------  IN: 240 mL / OUT: 2 mL / NET: 238 mL          PHYSICAL EXAM:  TELE: not on tele   Constitutional: NAD, awake and alert, well-developed  HEENT: Moist Mucous Membranes, Anicteric  Pulmonary: Non-labored, breath sounds are clear bilaterally, No wheezing, crackles or rhonchi  Cardiovascular: Regular, S1 and S2 nl, murmur   Gastrointestinal: Bowel Sounds present, soft, nontender.   Lymph: No lymphadenopathy. No peripheral edema.  Skin: No visible rashes or ulcers.  Psych:  Mood & affect appropriate    LABS: All Labs Reviewed:                        10.0   9.55  )-----------( 312      ( 2023 06:50 )             30.9                         10.2   10.25 )-----------( 285      ( 2023 06:35 )             32.3     2023 06:50    138    |  107    |  17     ----------------------------<  87     3.4     |  25     |  0.87   2023 06:35    139    |  107    |  24     ----------------------------<  87     3.5     |  25     |  0.92     Ca    8.1        2023 06:50  Ca    8.3        2023 06:35  Phos  2.2       2023 06:35  Mg     2.3       2023 06:35    TPro  6.2    /  Alb  2.3    /  TBili  0.3    /  DBili  x      /  AST  23     /  ALT  24     /  AlkPhos  57     2023 06:50  TPro  6.2    /  Alb  2.2    /  TBili  0.4    /  DBili  x      /  AST  23     /  ALT  23     /  AlkPhos  61     2023 06:35             EC Lead ECG:   Ventricular Rate 82 BPM    Atrial Rate 82 BPM    P-R Interval 128 ms    QRS Duration 92 ms    Q-T Interval 386 ms    QTC Calculation(Bazett) 450 ms    P Axis 40 degrees    R Axis -26 degrees    T Axis 25 degrees    Diagnosis Line Sinus rhythm with premature supraventricular complexes  Otherwise normal ECG  When compared with ECG of 15-VIANEY-2023 03:26,  No significant change was found  Confirmed by Lela Soto ([id number]) on 2023 9:09:57 AM (01-15-23 @ 09:34)      ACC: [id number] EXAM:  ECHO TTE WO CON COMP W DOPP                          PROCEDURE DATE:  2023          INTERPRETATION:  INDICATION: Abnormal EKG  Sonographer KL    Blood Pressure 136/81    Height 158 cm     Weight 57 kg       BSA 1.6 sq m    Dimensions:  LA 2.3       Normal Values: 2.0 - 4.0 cm  Ao 3.3        Normal Values: 2.0 - 3.8 cm  SEPTUM 1.2       Normal Values: 0.6 - 1.2 cm  PWT 1.1       Normal Values: 0.6 - 1.1 cm  LVIDd 4.0         Normal Values: 3.0 - 5.6 cm  LVIDs 2.4  Normal Values: 1.8 - 4.0 cm      OBSERVATIONS:  Mitral Valve: normal, trace physiologic MR.  Aortic Valve/Aorta: Sclerotic trileaflet aortic valve. Trace to mild AI  Tricuspid Valve: normal with trace TR.  Pulmonic Valve: Not well-visualized  Left Atrium: normal  Right Atrium: Not well-visualized  Left Ventricle: normal LV size and systolic function, estimated LVEF of   65%.  Right Ventricle: Grossly normal size and systolic function.  Pericardium: Trace pericardial effusion.      IMPRESSION:  Normal left ventricular internal dimensions and systolic function,   estimated LVEF of 65%.  Grossly normal RV size and systolic function.  Sclerotic trileaflet aortic valve, trace to mild AI.  Trace physiologic MR and TR.  Trace pericardial effusion.

## 2023-01-20 NOTE — PROGRESS NOTE ADULT - PROVIDER SPECIALTY LIST ADULT
Cardiology
Orthopedics
Infectious Disease
Infectious Disease
Neurology
Neurology
Cardiology
Infectious Disease
Infectious Disease
Neurology
Cardiology
Hospitalist
Hospitalist
Internal Medicine
Hospitalist
Hospitalist

## 2023-01-20 NOTE — PROGRESS NOTE ADULT - NS ATTEND AMEND GEN_ALL_CORE FT
Stable CV pov.  Continue current management.  To follow.
Stable CV pov.  Continue current management.  To follow.
No active cardiac conditions.  Stable for d/c from cardiac point of view.  To follow.
I have personally seen and examined the patient in detail.  I have spoken to the provider regarding the assessment and plan of care.  I have made changes to the note accordingly.
HR improved. No signs of significant ischemia or volume overload. cont current care. Further cardiac workup will depend on clinical course.

## 2023-01-21 LAB
CULTURE RESULTS: SIGNIFICANT CHANGE UP
CULTURE RESULTS: SIGNIFICANT CHANGE UP
SPECIMEN SOURCE: SIGNIFICANT CHANGE UP
SPECIMEN SOURCE: SIGNIFICANT CHANGE UP

## 2023-02-10 ENCOUNTER — INPATIENT (INPATIENT)
Facility: HOSPITAL | Age: 88
LOS: 5 days | Discharge: NURSING, REHAB OR LONG-TERM CARE | DRG: 309 | End: 2023-02-16
Attending: INTERNAL MEDICINE | Admitting: INTERNAL MEDICINE
Payer: MEDICARE

## 2023-02-10 VITALS
TEMPERATURE: 96 F | WEIGHT: 112.44 LBS | HEIGHT: 62 IN | DIASTOLIC BLOOD PRESSURE: 58 MMHG | OXYGEN SATURATION: 97 % | RESPIRATION RATE: 18 BRPM | SYSTOLIC BLOOD PRESSURE: 83 MMHG | HEART RATE: 140 BPM

## 2023-02-10 DIAGNOSIS — I48.91 UNSPECIFIED ATRIAL FIBRILLATION: ICD-10-CM

## 2023-02-10 DIAGNOSIS — F03.90 UNSPECIFIED DEMENTIA WITHOUT BEHAVIORAL DISTURBANCE: ICD-10-CM

## 2023-02-10 DIAGNOSIS — D64.9 ANEMIA, UNSPECIFIED: ICD-10-CM

## 2023-02-10 DIAGNOSIS — I10 ESSENTIAL (PRIMARY) HYPERTENSION: ICD-10-CM

## 2023-02-10 DIAGNOSIS — J90 PLEURAL EFFUSION, NOT ELSEWHERE CLASSIFIED: ICD-10-CM

## 2023-02-10 DIAGNOSIS — Z29.9 ENCOUNTER FOR PROPHYLACTIC MEASURES, UNSPECIFIED: ICD-10-CM

## 2023-02-10 DIAGNOSIS — I48.92 UNSPECIFIED ATRIAL FLUTTER: ICD-10-CM

## 2023-02-10 LAB
ALBUMIN SERPL ELPH-MCNC: 3 G/DL — LOW (ref 3.3–5)
ALP SERPL-CCNC: 80 U/L — SIGNIFICANT CHANGE UP (ref 40–120)
ALT FLD-CCNC: 11 U/L — LOW (ref 12–78)
ANION GAP SERPL CALC-SCNC: 7 MMOL/L — SIGNIFICANT CHANGE UP (ref 5–17)
APTT BLD: 26.7 SEC — LOW (ref 27.5–35.5)
AST SERPL-CCNC: 12 U/L — LOW (ref 15–37)
BASOPHILS # BLD AUTO: 0.06 K/UL — SIGNIFICANT CHANGE UP (ref 0–0.2)
BASOPHILS NFR BLD AUTO: 0.7 % — SIGNIFICANT CHANGE UP (ref 0–2)
BILIRUB SERPL-MCNC: 0.5 MG/DL — SIGNIFICANT CHANGE UP (ref 0.2–1.2)
BUN SERPL-MCNC: 19 MG/DL — SIGNIFICANT CHANGE UP (ref 7–23)
CALCIUM SERPL-MCNC: 9.1 MG/DL — SIGNIFICANT CHANGE UP (ref 8.5–10.1)
CHLORIDE SERPL-SCNC: 105 MMOL/L — SIGNIFICANT CHANGE UP (ref 96–108)
CK MB BLD-MCNC: 9.6 % — HIGH (ref 0–3.5)
CK MB CFR SERPL CALC: 4.4 NG/ML — HIGH (ref 0–3.6)
CK SERPL-CCNC: 46 U/L — SIGNIFICANT CHANGE UP (ref 26–192)
CO2 SERPL-SCNC: 26 MMOL/L — SIGNIFICANT CHANGE UP (ref 22–31)
CREAT SERPL-MCNC: 0.9 MG/DL — SIGNIFICANT CHANGE UP (ref 0.5–1.3)
EGFR: 62 ML/MIN/1.73M2 — SIGNIFICANT CHANGE UP
EOSINOPHIL # BLD AUTO: 0.27 K/UL — SIGNIFICANT CHANGE UP (ref 0–0.5)
EOSINOPHIL NFR BLD AUTO: 3 % — SIGNIFICANT CHANGE UP (ref 0–6)
FLUAV AG NPH QL: SIGNIFICANT CHANGE UP
FLUBV AG NPH QL: SIGNIFICANT CHANGE UP
GLUCOSE SERPL-MCNC: 106 MG/DL — HIGH (ref 70–99)
HCT VFR BLD CALC: 31.6 % — LOW (ref 34.5–45)
HGB BLD-MCNC: 9.9 G/DL — LOW (ref 11.5–15.5)
IMM GRANULOCYTES NFR BLD AUTO: 0.9 % — SIGNIFICANT CHANGE UP (ref 0–0.9)
INR BLD: 1.1 RATIO — SIGNIFICANT CHANGE UP (ref 0.88–1.16)
LACTATE SERPL-SCNC: 1.9 MMOL/L — SIGNIFICANT CHANGE UP (ref 0.7–2)
LYMPHOCYTES # BLD AUTO: 1.38 K/UL — SIGNIFICANT CHANGE UP (ref 1–3.3)
LYMPHOCYTES # BLD AUTO: 15.3 % — SIGNIFICANT CHANGE UP (ref 13–44)
MAGNESIUM SERPL-MCNC: 1.8 MG/DL — SIGNIFICANT CHANGE UP (ref 1.6–2.6)
MCHC RBC-ENTMCNC: 29.8 PG — SIGNIFICANT CHANGE UP (ref 27–34)
MCHC RBC-ENTMCNC: 31.3 GM/DL — LOW (ref 32–36)
MCV RBC AUTO: 95.2 FL — SIGNIFICANT CHANGE UP (ref 80–100)
MONOCYTES # BLD AUTO: 0.8 K/UL — SIGNIFICANT CHANGE UP (ref 0–0.9)
MONOCYTES NFR BLD AUTO: 8.9 % — SIGNIFICANT CHANGE UP (ref 2–14)
NEUTROPHILS # BLD AUTO: 6.43 K/UL — SIGNIFICANT CHANGE UP (ref 1.8–7.4)
NEUTROPHILS NFR BLD AUTO: 71.2 % — SIGNIFICANT CHANGE UP (ref 43–77)
NRBC # BLD: 0 /100 WBCS — SIGNIFICANT CHANGE UP (ref 0–0)
PHOSPHATE SERPL-MCNC: 3 MG/DL — SIGNIFICANT CHANGE UP (ref 2.5–4.5)
PLATELET # BLD AUTO: 283 K/UL — SIGNIFICANT CHANGE UP (ref 150–400)
POTASSIUM SERPL-MCNC: 3.8 MMOL/L — SIGNIFICANT CHANGE UP (ref 3.5–5.3)
POTASSIUM SERPL-SCNC: 3.8 MMOL/L — SIGNIFICANT CHANGE UP (ref 3.5–5.3)
PROT SERPL-MCNC: 7.1 G/DL — SIGNIFICANT CHANGE UP (ref 6–8.3)
PROTHROM AB SERPL-ACNC: 12.9 SEC — SIGNIFICANT CHANGE UP (ref 10.5–13.4)
RBC # BLD: 3.32 M/UL — LOW (ref 3.8–5.2)
RBC # FLD: 15.3 % — HIGH (ref 10.3–14.5)
RSV RNA NPH QL NAA+NON-PROBE: SIGNIFICANT CHANGE UP
SARS-COV-2 RNA SPEC QL NAA+PROBE: SIGNIFICANT CHANGE UP
SODIUM SERPL-SCNC: 138 MMOL/L — SIGNIFICANT CHANGE UP (ref 135–145)
TROPONIN I, HIGH SENSITIVITY RESULT: 1179.9 NG/L — HIGH
TROPONIN I, HIGH SENSITIVITY RESULT: 1931.3 NG/L — HIGH
TROPONIN I, HIGH SENSITIVITY RESULT: 61.5 NG/L — HIGH
WBC # BLD: 9.02 K/UL — SIGNIFICANT CHANGE UP (ref 3.8–10.5)
WBC # FLD AUTO: 9.02 K/UL — SIGNIFICANT CHANGE UP (ref 3.8–10.5)

## 2023-02-10 PROCEDURE — 93010 ELECTROCARDIOGRAM REPORT: CPT

## 2023-02-10 PROCEDURE — 99291 CRITICAL CARE FIRST HOUR: CPT

## 2023-02-10 PROCEDURE — 99223 1ST HOSP IP/OBS HIGH 75: CPT | Mod: GC,AI

## 2023-02-10 PROCEDURE — 99285 EMERGENCY DEPT VISIT HI MDM: CPT | Mod: FS

## 2023-02-10 PROCEDURE — 71045 X-RAY EXAM CHEST 1 VIEW: CPT | Mod: 26

## 2023-02-10 RX ORDER — ONDANSETRON 8 MG/1
4 TABLET, FILM COATED ORAL EVERY 8 HOURS
Refills: 0 | Status: DISCONTINUED | OUTPATIENT
Start: 2023-02-10 | End: 2023-02-16

## 2023-02-10 RX ORDER — DIGOXIN 250 MCG
125 TABLET ORAL EVERY 6 HOURS
Refills: 0 | Status: COMPLETED | OUTPATIENT
Start: 2023-02-10 | End: 2023-02-11

## 2023-02-10 RX ORDER — HEPARIN SODIUM 5000 [USP'U]/ML
INJECTION INTRAVENOUS; SUBCUTANEOUS
Qty: 25000 | Refills: 0 | Status: DISCONTINUED | OUTPATIENT
Start: 2023-02-10 | End: 2023-02-13

## 2023-02-10 RX ORDER — ENOXAPARIN SODIUM 100 MG/ML
50 INJECTION SUBCUTANEOUS ONCE
Refills: 0 | Status: COMPLETED | OUTPATIENT
Start: 2023-02-10 | End: 2023-02-10

## 2023-02-10 RX ORDER — DIGOXIN 250 MCG
500 TABLET ORAL ONCE
Refills: 0 | Status: COMPLETED | OUTPATIENT
Start: 2023-02-10 | End: 2023-02-10

## 2023-02-10 RX ORDER — HEPARIN SODIUM 5000 [USP'U]/ML
3200 INJECTION INTRAVENOUS; SUBCUTANEOUS EVERY 6 HOURS
Refills: 0 | Status: DISCONTINUED | OUTPATIENT
Start: 2023-02-10 | End: 2023-02-13

## 2023-02-10 RX ORDER — ACETAMINOPHEN 500 MG
650 TABLET ORAL EVERY 6 HOURS
Refills: 0 | Status: DISCONTINUED | OUTPATIENT
Start: 2023-02-10 | End: 2023-02-16

## 2023-02-10 RX ORDER — SODIUM CHLORIDE 9 MG/ML
1000 INJECTION INTRAMUSCULAR; INTRAVENOUS; SUBCUTANEOUS ONCE
Refills: 0 | Status: COMPLETED | OUTPATIENT
Start: 2023-02-10 | End: 2023-02-10

## 2023-02-10 RX ORDER — METOPROLOL TARTRATE 50 MG
2.5 TABLET ORAL ONCE
Refills: 0 | Status: COMPLETED | OUTPATIENT
Start: 2023-02-10 | End: 2023-02-10

## 2023-02-10 RX ORDER — HEPARIN SODIUM 5000 [USP'U]/ML
3200 INJECTION INTRAVENOUS; SUBCUTANEOUS ONCE
Refills: 0 | Status: COMPLETED | OUTPATIENT
Start: 2023-02-10 | End: 2023-02-10

## 2023-02-10 RX ORDER — DIGOXIN 250 MCG
125 TABLET ORAL EVERY 8 HOURS
Refills: 0 | Status: DISCONTINUED | OUTPATIENT
Start: 2023-02-10 | End: 2023-02-10

## 2023-02-10 RX ORDER — DILTIAZEM HCL 120 MG
2.5 CAPSULE, EXT RELEASE 24 HR ORAL
Qty: 125 | Refills: 0 | Status: DISCONTINUED | OUTPATIENT
Start: 2023-02-10 | End: 2023-02-11

## 2023-02-10 RX ORDER — LANOLIN ALCOHOL/MO/W.PET/CERES
3 CREAM (GRAM) TOPICAL AT BEDTIME
Refills: 0 | Status: DISCONTINUED | OUTPATIENT
Start: 2023-02-10 | End: 2023-02-16

## 2023-02-10 RX ORDER — ASPIRIN/CALCIUM CARB/MAGNESIUM 324 MG
324 TABLET ORAL ONCE
Refills: 0 | Status: COMPLETED | OUTPATIENT
Start: 2023-02-10 | End: 2023-02-10

## 2023-02-10 RX ORDER — DIGOXIN 250 MCG
125 TABLET ORAL DAILY
Refills: 0 | Status: DISCONTINUED | OUTPATIENT
Start: 2023-02-11 | End: 2023-02-11

## 2023-02-10 RX ADMIN — Medication 500 MICROGRAM(S): at 15:11

## 2023-02-10 RX ADMIN — Medication 2.5 MILLIGRAM(S): at 12:29

## 2023-02-10 RX ADMIN — Medication 2.5 MG/HR: at 13:38

## 2023-02-10 RX ADMIN — SODIUM CHLORIDE 1000 MILLILITER(S): 9 INJECTION INTRAMUSCULAR; INTRAVENOUS; SUBCUTANEOUS at 13:41

## 2023-02-10 RX ADMIN — HEPARIN SODIUM 3200 UNIT(S): 5000 INJECTION INTRAVENOUS; SUBCUTANEOUS at 23:17

## 2023-02-10 RX ADMIN — ENOXAPARIN SODIUM 50 MILLIGRAM(S): 100 INJECTION SUBCUTANEOUS at 15:11

## 2023-02-10 RX ADMIN — Medication 324 MILLIGRAM(S): at 22:41

## 2023-02-10 RX ADMIN — SODIUM CHLORIDE 1000 MILLILITER(S): 9 INJECTION INTRAMUSCULAR; INTRAVENOUS; SUBCUTANEOUS at 12:28

## 2023-02-10 RX ADMIN — HEPARIN SODIUM 650 UNIT(S)/HR: 5000 INJECTION INTRAVENOUS; SUBCUTANEOUS at 23:15

## 2023-02-10 RX ADMIN — HEPARIN SODIUM 650 UNIT(S)/HR: 5000 INJECTION INTRAVENOUS; SUBCUTANEOUS at 23:18

## 2023-02-10 NOTE — ED PROVIDER NOTE - HIV OFFER
Increased quantity of imitrex for migraines  Start duloxetine (cymbalta) for depression, migraines, pain   Can take 2-3 weeks to see benefit  Recheck 3-4 weeks - labs likely then   Previously Declined (within the last year)

## 2023-02-10 NOTE — ED PROVIDER NOTE - OBJECTIVE STATEMENT
87-year-old female with hypertension brought in by ambulance from [facility] living for hypotension and atrial fibrillation.  Patient limited historian and states she is unsure why she is here.  As per EMS patient was hypotensive and tachycardic with an EKG that looked like A-fib.  No known history of A-fib.  As per previous records patient with history of dementia and limited historian.  Denies fever, chills, chest pain, shortness of breath, abdominal pain, nausea, vomiting, syncope, upper or lower extremity weakness or paresthesias.    pmd: Dr. Bre Cadena

## 2023-02-10 NOTE — H&P ADULT - PROBLEM SELECTOR PLAN 4
Chronic  - On home metoprolol as noted above  - Holding now due to soft bps  - Monitor routine hemodynamics

## 2023-02-10 NOTE — H&P ADULT - NSHPPHYSICALEXAM_GEN_ALL_CORE
VITALS:   T(C): 35.7 (02-10-23 @ 10:47), Max: 35.7 (02-10-23 @ 10:47)  HR: 120 (02-10-23 @ 12:00) (120 - 140)  BP: 102/74 (02-10-23 @ 12:00) (83/58 - 102/74)  RR: 18 (02-10-23 @ 10:47) (18 - 18)  SpO2: 97% (02-10-23 @ 10:47) (97% - 97%)    PHYSICAL EXAM:  General: Lying in bed comfortably. No acute distress. Pleasant.  Head: Atraumatic, normocephalic.  Eyes: EOMI, PERRLA. Conjunctiva and sclera clear.  ENT: Moist mucous membranes. No exudates.   Neck: Supple. No obvious JVD.  Heart: +S1, S2. Irregular rate and rhythm. Tachycardia.  Lungs: CTA bilaterally. Symmetric breath sounds. No crackles, wheezing, or rhonchi.  Abdomen: Soft, nontender, nondistended. Normoactive bowel sounds present.  Extremities: No calf tenderness. No clubbing, cyanosis. No edema.  Nervous System: A&Ox2 to self and place. Answers some questions appropriately. Follow commands. Able to move all 4 extremities. Unreliable historian, poor memory.  Skin: No obvious lesions. Warm skin, appears well perfused.

## 2023-02-10 NOTE — H&P ADULT - PROBLEM SELECTOR PLAN 1
Pt presents with new onset Afib RVR up to 140s and hypotension  - In the ED: 1L NS bolus, 1x Lopressor 2.5mg IVP, Cardizem gtt 5mL/hr, Lovenox 50mg, Dig 500mcg IVP  - EKG on admission: Afib with RVR 132bpm, PVCs  - Holding home Metoprolol 12.5 mg BID due to low blood pressures; pt missed dose at rehab due to hypotension  - Dig loading - will give 0.25mg IV q6H for 2 more doses then 0.125mg PO daily  - Lovenox 50mg q12h for full dose AC  - Trops 61.5, likely due to demand ischemia - will trend till peak  - F/u TSH, Mg, Phos  - TTE 1/2023 showed normal LV & RV size and function EF 65%, mild AI, trace MR and TR, trace pericardial effusion.   - [id number] score: 4  - Monitor lytes and replete as needed  - Telemonitoring  - Cardiology Dr. Philip consulted, recs appreciated Pt presents with new onset Afib RVR up to 140s and hypotension  - In the ED: 1L NS bolus, 1x Lopressor 2.5mg IVP, Cardizem gtt 5mL/hr, Lovenox 50mg, Dig 500mcg IVP  - EKG on admission: Afib with RVR 132bpm, PVCs  - Holding home Metoprolol 12.5 mg BID due to low blood pressures; pt missed dose at rehab due to hypotension  - Dig loading - will give 0.25mg IV q6H for 2 more doses then 0.125mg PO daily  - Holding off Lovenox 50mg q12h for full dose AC - patient son Tomer refused blood thinner  - Trops 61.5, likely due to demand ischemia - will trend till peak  - F/u TSH, Mg, Phos  - TTE 1/2023 showed normal LV & RV size and function EF 65%, mild AI, trace MR and TR, trace pericardial effusion.   - [id number] score: 4  - Monitor lytes and replete as needed  - Telemonitoring  - Cardiology Dr. Philip consulted, recs appreciated Pt presents with new onset Afib RVR up to 140s and hypotension  - In the ED: 1L NS bolus, 1x Lopressor 2.5mg IVP, Cardizem gtt 5mL/hr, Lovenox 50mg, Dig 500mcg IVP  - EKG on admission: Afib with RVR 132bpm, PVCs  - Holding home Metoprolol 12.5 mg BID due to low blood pressures; pt missed dose at rehab due to hypotension  - Dig loading - will give 0.25mg IV q6H for 2 more doses then 0.125mg PO daily  - Holding off Lovenox 50mg q12h for full dose AC - patient son Tomer and Joshua who are the medical decision makers refused blood thinner until further discussion with patients primary cardiologist. They understand that patient is at increased risk of stroke without anti-coag  - Trops 61.5, likely due to demand ischemia - will trend till peak  - F/u TSH, Mg, Phos  - TTE 1/2023 showed normal LV & RV size and function EF 65%, mild AI, trace MR and TR, trace pericardial effusion.   - [id number] score: 4  - Monitor lytes and replete as needed  - Telemonitoring  - Cardiology Dr. Philip consulted, recs appreciated

## 2023-02-10 NOTE — H&P ADULT - PROBLEM SELECTOR PLAN 6
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

## 2023-02-10 NOTE — H&P ADULT - HISTORY OF PRESENT ILLNESS
The patient is a 87y Female with PMHx of HTN, dementia presenting with hypotension and found to be in new-onset Afib with RVR. Patient is A&Ox2, however is an unreliable historian with poor memory. Son (Tomer) by bedside provided history. Patient came from [facility] assisted living. During routine vitals check, was found to be hypotensive and her metoprolol was held. Pt was sent to the ED, where she was found to be tachy on the ambulance ride. In the ED, pt was found to be in Afib with RVR. Pt herself had no complaints but did note having some lightheadedness earlier in the ED. Cardio was consulted and pt needed to be started on a Cardizem gtt. Of note, pt was recently hospitalized 1/14/23 - 1/20/23 for UTI and bacteremia - was discharged to [facility] rehab and then subsequently to [facility].    In the ED,  VS: T 96.3F, , /74, RR 18, SpO2 97% on RA  Labs: Hgb 9.9, Albumin 3.0, Trop 61.5  Imaging: CXR - Left costophrenic angle is blunted suggesting small pleural effusion.  EKG: Afib with RVR 132bpm, PVCs  Received: 1L NS bolus, 1x Lopressor 2.5mg IVP, Cardizem gtt 5mL/hr, Lovenox 50mg, Dig 500mcg IVP

## 2023-02-10 NOTE — H&P ADULT - PROBLEM SELECTOR PLAN 2
Pt found to have small pleural effusion on CXR  - CXR - Left costophrenic angle is blunted suggesting small pleural effusion.  - Asymptomatic, no shortness of breath  - Continue to monitor respiratory status

## 2023-02-10 NOTE — PATIENT PROFILE ADULT - FALL HARM RISK - RISK INTERVENTIONS

## 2023-02-10 NOTE — ED PROVIDER NOTE - CONSTITUTIONAL, MLM
normal... Well appearing elderly female, awake, alert, oriented to person, place and in no apparent distress.

## 2023-02-10 NOTE — ED PROVIDER NOTE - DIFFERENTIAL DIAGNOSIS
Differential including but not limited to arrhythmia A-fib, effusion pneumothorax pneumonia or other infection Differential Diagnosis

## 2023-02-10 NOTE — CHART NOTE - NSCHARTNOTEFT_GEN_A_CORE
Pt [facility] to     Plan: Pt trop inc from 61.5 ->    Plan:  - stat EKG  - FU stat Trop, Mag, phos  - start heparin gtt  - Called son Indra Andrade, explained risks and benefits of heparin gtt, and he agreed to start a heparin gtt at this time  - RN to notify with changes Pt trop inc from 61.5 -> 1179.9    87y Female with PMHx of HTN, dementia presenting with hypotension and found to be in new-onset Afib with RVR being admitted for rate control. Now with NSTEMI.    Plan:  - NSTEMI  - stat EKG  - FU stat Trop, Mag, phos  - Give  mg  - start heparin gtt  - Called son Indra Andrade, explained risks and benefits of heparin gtt, and he agreed to start a heparin gtt at this time  - Cardio on board, FU recs in AM  - RN to notify with changes Pt troponin inc from 61.5 -> 1179.9    87y Female with PMHx of HTN, dementia presenting with hypotension and found to be in new-onset Afib with RVR being admitted for rate control. Now with elevated troponin.    Plan:  - suspect high troponin due to demand ischemia from AFib with RVR  - stat EKG  - FU stat Trop, Mag, phos  - Give  mg  - start heparin gtt  - Called son Indra Andrade, explained risks and benefits of heparin gtt, and he agreed to start a heparin gtt at this time  - Cardio consulted, FU recs in AM  - RN to notify with changes    -----------  Addendum @ 0200:  Troponin inc to 1931.  EKG: Sinus rhythm with bradycardia, HR 75, with sinus arrhythmia. No ST depressions. New T wave inversion in V3.  - continue to trend troponin to peak  - continue cardizem gtt at this time, will hold if bradycardia HR < 60.  - RN to notify with any changes Pt troponin inc from 61.5 -> 1179.9    87y Female with PMHx of HTN, dementia presenting with hypotension and found to be in new-onset Afib with RVR being admitted for rate control. Now with elevated troponin.    Plan:  - suspect high troponin due to demand ischemia from AFib with RVR  - stat EKG  - FU stat Trop, Mag, phos  - Give  mg  - start heparin gtt  - Called son Indra Andrade, explained risks and benefits of heparin gtt, and he agreed to start a heparin gtt at this time  - Cardio consulted, FU recs in AM  - RN to notify with changes    -----------  Addendum @ 0200:  Troponin inc to 1931.  EKG: Sinus rhythm with bradycardia, HR 75, with sinus arrhythmia. No ST depressions. New T wave inversion in V3.  - continue to trend troponin to peak  - continue cardizem gtt at this time, will hold if bradycardia HR < 60.  - RN to notify with any changes    ----------  Addendum @ 2450:  Trop peaked, now downtrending  - RN to notify with any changes

## 2023-02-10 NOTE — ED PROVIDER NOTE - CLINICAL SUMMARY MEDICAL DECISION MAKING FREE TEXT BOX
Patient brought in by EMS for rapid A-fib with hypotension.  Patient poor historian unsure why in ER.  Per report no fevers vomiting patient denies any pain or shortness of breath.  Plan EKG chest x-ray labs IV fluids metoprolol cardiology consult  Differential including but not limited to arrhythmia A-fib, effusion pneumothorax pneumonia or other infection

## 2023-02-10 NOTE — H&P ADULT - NSHPSOCIALHISTORY_GEN_ALL_CORE
Living Situation: came from [facility]  ADLs/Mobility: ambulates with walker, needs full assistance; prior to hospitalization in January was independent  Tobacco Use: remote history during age 20s  Alcohol Use: denies  Drug Use: denies  Vaccination: covid vaccinated x4 moderna

## 2023-02-10 NOTE — H&P ADULT - NSHPREVIEWOFSYSTEMS_GEN_ALL_CORE
Limited ROS due to dementia.    CONSTITUTIONAL: Denies weakness, fevers, or chills. +Lightheadedness earlier  EYES/ENT: Denies vertigo. Denies visual changes. Denies hearing changes.  NECK: Denies pain or stiffness.  RESPIRATORY: Denies cough, wheezing. Denies shortness of breath.  CARDIOVASCULAR: Denies chest pain or palpitations.  GASTROINTESTINAL: Denies abdominal pain. Denies nausea, vomiting.  GENITOURINARY: Denies dysuria, hematuria.  EXTREMITIES: Denies swelling or edema. Denies any pain.  NEUROLOGICAL: Denies numbness. Denies any focal weakness. Denies any episode of syncope.  SKIN: Denies rashes, bruising. Limited ROS due to dementia.    CONSTITUTIONAL: Denies weakness, fevers, or chills. +Lightheadedness earlier  EYES/ENT: Denies vertigo. Denies visual changes. Denies hearing changes.  NECK: Denies pain or stiffness.  RESPIRATORY: Denies cough, wheezing. Denies shortness of breath.  CARDIOVASCULAR: Denies chest pain or palpitations.  GASTROINTESTINAL: Denies abdominal pain. Denies nausea, vomiting. no melena or hematochezia  GENITOURINARY: Denies dysuria, hematuria.  EXTREMITIES: Denies swelling or edema. Denies any pain.  NEUROLOGICAL: Denies numbness. Denies any focal weakness. Denies any episode of syncope.  SKIN: Denies rashes, bruising.

## 2023-02-10 NOTE — CONSULT NOTE ADULT - NS ATTEND AMEND GEN_ALL_CORE FT
pt with hypotension and rapid af. will start on low dose cardizem gtt. load with dig IV.   IVF.   high risk for cva. would start on fd ac.   No signs of significant ischemia or volume overload.   Further cardiac workup will depend on clinical course.     Upon my evaluation, this patient is at high risk for imminent or life threatening deterioration due to AF with RVR and hypotension   and other active medical issues which require my direct attention, intervention, and personal management.  I have personally spent >30 minutes  of critical care time exclusive of time spent on separate billing procedures. This includes review of laboratory data, radiology results, discussion with primary team\patient, and monitoring for potential decompensation. Interventions were performed as documented above.

## 2023-02-10 NOTE — H&P ADULT - ATTENDING COMMENTS
87y Female with PMHx of HTN, dementia presenting with hypotension and found to be in new-onset Afib with RVR being admitted for rate control.    HPI as above.   Hx obtained from son Tomer at bedside and Joshua via phone.     T(C): 35.7 (02-10-23 @ 10:47), Max: 35.7 (02-10-23 @ 10:47)  HR: 120 (02-10-23 @ 12:00) (120 - 140)  BP: 102/74 (02-10-23 @ 12:00) (83/58 - 102/74)  RR: 18 (02-10-23 @ 10:47) (18 - 18)  SpO2: 97% (02-10-23 @ 10:47) (97% - 97%)  Wt(kg): --    Physical Exam:   GENERAL: well-groomed, thin female,  NAD  HEENT: head NC/AT; EOM intact, PERRLA, conjunctiva & sclera clear; hearing grossly intact, moist mucous membranes  NECK: supple, no JVD  RESPIRATORY: CTA B/L, no wheezing, rales, rhonchi or rubs  CARDIOVASCULAR: S1&S2,irreg irreg  ABDOMEN: soft, non-tender, non-distended, + Bowel sounds x4 quadrants, no guarding, rebound or rigidity  MUSCULOSKELETAL:  no clubbing, cyanosis or edema of all 4 extremities  LYMPH: no cervical lymphadenopathy  VASCULAR: Radial pulses 2+ bilaterally, no varicose veins   SKIN: warm and dry, color normal  NEUROLOGIC: AA&O X2 person and place only, CN2-12 intact w/ no focal deficits, no sensory loss, motor Strength 4/5 in UE & LE B/L    Plan:  Continue Digoxin and cardizem  -monitor HR and BP  -patients two sons are her medical decision makers and wish to hold off on AC until they speak with patients primary cardioogist.   -they understand the risks and benefits of AC and also withholding AC and increased risk of stroke/VTE  -patient has dementia and does not have medical decision making capacity.   plan reviewed with patient and her son Tomer at bedside and her son Joshua via phone  -trend cardiac enzynes  -anemia: no signs or symptoms of active bleeding. Continue to monitor hgb.     dvt ppx: SCD's

## 2023-02-10 NOTE — CONSULT NOTE ADULT - ASSESSMENT
87 F PMH HTN presents  from [facility] assisted living with low BP and tachycardia, found to be in A fib RVR. Patient was admitted recently here  after an unwitnessed fall, found to have E coli bacteremia and UTI on Jan 14, 23 and discharged 1/20/23. Cardiology was consulted A fib RVR and hypotension     New Onset A fib RVR, HTN, now Hypotension,   - Tele with A fib -140s.   - Patient on Metoprolol at rehab which was missed 2/2 hypotension.    - Would give Metoprolol 2.5 mg IV x 1. Will consider small doses of BB given Hypotension  - Prefers Metoprolol 12.5 mg Pox  1 if BP > 100 systolic  - If unable to give BB, consider digoxin load with 0.5 mg IV x 1 followed by 0.25 mg IV Q6h x 2 doses and then 0.125 mg PO daily   - Monitor on telemetry  - Can start FD Lovenox, decision for long term AC can be made depending on clinical course     - EKG showed A fib RVR @ 132.  - No evidence of any active ischemia   - Troponin HsI 61.5. Continue to trend till trends down     - ECHO 01/2023 showed normal LV & RV size and function EF 65%, mild AI, trace MR and TR, trace pericardial effusion.   - No evidence of any meaningful volume overload   - S/p 1L IVF x 1     - Monitor and replete lytes, keep K>4, Mg>2.  - Will continue to follow.    Darya Samuels, MS FNP, [facility]  Nurse Practitioner- Cardiology   Spectra #[phone] /[phone]     87 F PMH HTN presents  from [facility] assisted living with low BP and tachycardia, found to be in A fib RVR. Patient was admitted recently here  after an unwitnessed fall, found to have E coli bacteremia and UTI on Jan 14, 23 and discharged 1/20/23. Cardiology was consulted A fib RVR and hypotension     New Onset A fib RVR, HTN, now Hypotension,   - Tele with A fib -140s.   - Patient on Metoprolol at rehab which was missed 2/2 hypotension.    - Would give Metoprolol 2.5 mg IV x 1. Will consider small doses of BB given Hypotension  - Prefers Metoprolol 12.5 mg Pox  1 if BP > 100 systolic  - If unable to give BB, consider digoxin load with 0.5 mg IV x 1 followed by 0.25 mg IV Q6h x 2 doses and then 0.125 mg PO daily   - Monitor on telemetry  - Can start FD Lovenox, decision for long term AC can be made depending on clinical course     - EKG showed A fib RVR @ 132.  - No evidence of any active ischemia   - Troponin HsI 61.5. Likely demand from A fib RVR Continue to trend till trends down     - ECHO 01/2023 showed normal LV & RV size and function EF 65%, mild AI, trace MR and TR, trace pericardial effusion.   - No evidence of any meaningful volume overload   - S/p 1L IVF x 1     - Monitor and replete lytes, keep K>4, Mg>2.  - Will continue to follow.    Darya Samuels, MS FNP, [facility]  Nurse Practitioner- Cardiology   Spectra #[phone] /[phone]

## 2023-02-10 NOTE — H&P ADULT - ASSESSMENT
87y Female with PMHx of HTN, dementia presenting with hypotension and found to be in new-onset Afib with RVR being admitted for rate control.      87 F PMH HTN presents  from [facility] assisted living with low BP and tachycardia, found to be in A fib RVR. Patient was admitted recently here  after an unwitnessed fall, found to have E coli bacteremia and UTI on Jan 14, 23 and discharged 1/20/23. Cardiology was consulted A fib RVR and hypotension     New Onset A fib RVR, HTN, now Hypotension,   - Tele with A fib -140s.   - Patient on Metoprolol at rehab which was missed 2/2 hypotension.    - Would give Metoprolol 2.5 mg IV x 1. Will consider small doses of BB given Hypotension  - Prefers Metoprolol 12.5 mg Pox  1 if BP > 100 systolic  - If unable to give BB, consider digoxin load with 0.5 mg IV x 1 followed by 0.25 mg IV Q6h x 2 doses and then 0.125 mg PO daily   - Monitor on telemetry  - Can start FD Lovenox, decision for long term AC can be made depending on clinical course     - EKG showed A fib RVR @ 132.  - No evidence of any active ischemia   - Troponin HsI 61.5. Likely demand from A fib RVR Continue to trend till trends down     - ECHO 01/2023 showed normal LV & RV size and function EF 65%, mild AI, trace MR and TR, trace pericardial effusion.   - No evidence of any meaningful volume overload   - S/p 1L IVF x 1     - Monitor and replete lytes, keep K>4, Mg>2.  - Will continue to follow.   87y Female with PMHx of HTN, dementia presenting with hypotension and found to be in new-onset Afib with RVR being admitted for rate control.

## 2023-02-10 NOTE — CONSULT NOTE ADULT - SUBJECTIVE AND OBJECTIVE BOX
[facility] - Jeremi Perkins, Chapin, Cedrick, Charles, Portia; Office Number: [phone]    Initial Consult Note  CHIEF COMPLAINT: Patient is a 87y old  Female who presents with a chief complaint of hypotension     HPI: 87 F PMH HTN presents with low BP. Pt is from [facility] assisted living , and was noted to be hypotensive. Patient appears to be forgetful and unsure if she is a good historian. Pt reports that they told me BP is low, but I feel fine. Patient reports numbness on both hands and both hands appear pale on examination. In ER, patient noted to be in A fib -140s. Patient on Metoprolol at rehab which was missed 2/2 hypotension. Patient was admitted recently here  after an unwitnessed fall, found to have E coli bacteremia and UTI on Jan 14, 23 and discharged 1/20/23. Cardiology was consulted for tachycardia at that time which was ST with PACs.     In ED, T(F): 96.3, HR: 140, BP: 83/58, RR: 18, SpO2: 97%. Labs remarkable for WBC 9.02, Hb 9.9,HCT 31.6, Troponin HsI 61.5 Lactate 1.9. EKG showed A fib RVR @ 132. Cardiology called for A fib RVR and hypotension     Allergies  Allergy Status Unknown  No Known Allergies    Intolerances      PAST MEDICAL & SURGICAL HISTORY:  HTN (hypertension)    MEDICATIONS  (STANDING):  metoprolol tartrate Injectable 2.5 milliGRAM(s) IV Push once  sodium chloride 0.9% Bolus 1000 milliLiter(s) IV Bolus once    MEDICATIONS  (PRN):    FAMILY HISTORY:  FHx: hypertension (Mother)  FHx: diabetes mellitus (Mother)    SOCIAL HISTORY: No active tobacco, ethanol, or drug abuse.    REVIEW OF SYSTEMS   All other review of systems is negative unless indicated above.    VITAL SIGNS:   Vital Signs Last 24 Hrs  T(C): 35.7 (10 Feb 2023 10:47), Max: 35.7 (10 Feb 2023 10:47)  T(F): 96.3 (10 Feb 2023 10:47), Max: 96.3 (10 Feb 2023 10:47)  HR: 140 (10 Feb 2023 10:47) (140 - 140)  BP: 83/58 (10 Feb 2023 10:47) (83/58 - 83/58)  BP(mean): --  RR: 18 (10 Feb 2023 10:47) (18 - 18)  SpO2: 97% (10 Feb 2023 10:47) (97% - 97%)    Parameters below as of 10 Feb 2023 10:47  Patient On (Oxygen Delivery Method): room air    Physical Exam:  Constitutional: NAD, awake and alert  HEENT: Moist Mucous Membranes, Anicteric  Pulmonary: Non-labored, breath sounds are clear bilaterally, No wheezing, rales or rhonchi  Cardiovascular: Regular, S1 and S2, No murmurs, No rubs, gallops or clicks  Gastrointestinal: Bowel Sounds present, soft, nontender.   Lymph: No peripheral edema. No lymphadenopathy.  Skin: No visible rashes or ulcers. B/L hands pale   Psych:  confused     I&O's Summary      LABS: All Labs Reviewed:                        9.9    9.02  )-----------( 283      ( 10 Feb 2023 11:00 )             31.6     10 Feb 2023 11:00    138    |  105    |  19     ----------------------------<  106    3.8     |  26     |  0.90     Ca    9.1        10 Feb 2023 11:00    TPro  7.1    /  Alb  3.0    /  TBili  0.5    /  DBili  x      /  AST  12     /  ALT  11     /  AlkPhos  80     10 Feb 2023 11:00    PT/INR - ( 10 Feb 2023 11:00 )   PT: 12.9 sec;   INR: 1.10 ratio         PTT - ( 10 Feb 2023 11:00 )  PTT:26.7 secThyroid Stimulating Hormone, Serum: 2.21 uIU/mL (01-19-23 @ 06:50)    Blood Culture:     ACC: [id number] EXAM:  ECHO TTE WO CON COMP W DOPP                        PROCEDURE DATE:  01/17/2023    INTERPRETATION:  INDICATION: Abnormal EKG  Sonographer     Blood Pressure 136/81    Height 158 cm     Weight 57 kg       BSA 1.6 sq m    Dimensions:  LA 2.3       Normal Values: 2.0 - 4.0 cm  Ao 3.3        Normal Values: 2.0 - 3.8 cm  SEPTUM 1.2       Normal Values: 0.6 - 1.2 cm  PWT 1.1       Normal Values: 0.6 - 1.1 cm  LVIDd 4.0         Normal Values: 3.0 - 5.6 cm  LVIDs 2.4  Normal Values: 1.8 - 4.0 cm    OBSERVATIONS:  Mitral Valve: normal, trace physiologic MR.  Aortic Valve/Aorta: Sclerotic trileaflet aortic valve. Trace to mild AI  Tricuspid Valve: normal with trace TR.  Pulmonic Valve: Not well-visualized  Left Atrium: normal  Right Atrium: Not well-visualized  Left Ventricle: normal LV size and systolic function, estimated LVEF of   65%.  Right Ventricle: Grossly normal size and systolic function.  Pericardium: Trace pericardial effusion.    IMPRESSION:  Normal left ventricular internal dimensions and systolic function,   estimated LVEF of 65%.  Grossly normal RV size and systolic function.  Sclerotic trileaflet aortic valve, trace to mild AI.  Trace physiologic MR and TR.  Trace pericardial effusion.  --- End of Report ---    JELLY BINGHAM MD; Attending Cardiologist  This document has been electronically signed. Jan 18 2023 12:47PM

## 2023-02-10 NOTE — H&P ADULT - PROBLEM SELECTOR PLAN 5
Chronic  - Worsening memory status as per son by bedside; at baseline currently  - Able to answer some questions appropriately - however unreliable historian  - Not on any meds

## 2023-02-11 ENCOUNTER — TRANSCRIPTION ENCOUNTER (OUTPATIENT)
Age: 88
End: 2023-02-11

## 2023-02-11 LAB
ALBUMIN SERPL ELPH-MCNC: 2.8 G/DL — LOW (ref 3.3–5)
ALP SERPL-CCNC: 61 U/L — SIGNIFICANT CHANGE UP (ref 40–120)
ALT FLD-CCNC: 10 U/L — LOW (ref 12–78)
ANION GAP SERPL CALC-SCNC: 7 MMOL/L — SIGNIFICANT CHANGE UP (ref 5–17)
APPEARANCE UR: ABNORMAL
APTT BLD: 116.4 SEC — HIGH (ref 27.5–35.5)
APTT BLD: 46.1 SEC — HIGH (ref 27.5–35.5)
APTT BLD: 47.1 SEC — HIGH (ref 27.5–35.5)
AST SERPL-CCNC: 17 U/L — SIGNIFICANT CHANGE UP (ref 15–37)
BASOPHILS # BLD AUTO: 0.03 K/UL — SIGNIFICANT CHANGE UP (ref 0–0.2)
BASOPHILS NFR BLD AUTO: 0.3 % — SIGNIFICANT CHANGE UP (ref 0–2)
BILIRUB SERPL-MCNC: 0.3 MG/DL — SIGNIFICANT CHANGE UP (ref 0.2–1.2)
BILIRUB UR-MCNC: NEGATIVE — SIGNIFICANT CHANGE UP
BUN SERPL-MCNC: 20 MG/DL — SIGNIFICANT CHANGE UP (ref 7–23)
CALCIUM SERPL-MCNC: 8.5 MG/DL — SIGNIFICANT CHANGE UP (ref 8.5–10.1)
CHLORIDE SERPL-SCNC: 108 MMOL/L — SIGNIFICANT CHANGE UP (ref 96–108)
CK MB BLD-MCNC: 8.4 % — HIGH (ref 0–3.5)
CK MB CFR SERPL CALC: 4.3 NG/ML — HIGH (ref 0–3.6)
CK SERPL-CCNC: 51 U/L — SIGNIFICANT CHANGE UP (ref 26–192)
CO2 SERPL-SCNC: 26 MMOL/L — SIGNIFICANT CHANGE UP (ref 22–31)
COLOR SPEC: YELLOW — SIGNIFICANT CHANGE UP
CREAT SERPL-MCNC: 0.87 MG/DL — SIGNIFICANT CHANGE UP (ref 0.5–1.3)
DIFF PNL FLD: ABNORMAL
EGFR: 65 ML/MIN/1.73M2 — SIGNIFICANT CHANGE UP
EOSINOPHIL # BLD AUTO: 0.12 K/UL — SIGNIFICANT CHANGE UP (ref 0–0.5)
EOSINOPHIL NFR BLD AUTO: 1.3 % — SIGNIFICANT CHANGE UP (ref 0–6)
GLUCOSE SERPL-MCNC: 91 MG/DL — SIGNIFICANT CHANGE UP (ref 70–99)
GLUCOSE UR QL: NEGATIVE — SIGNIFICANT CHANGE UP
HCT VFR BLD CALC: 27.6 % — LOW (ref 34.5–45)
HGB BLD-MCNC: 8.9 G/DL — LOW (ref 11.5–15.5)
IMM GRANULOCYTES NFR BLD AUTO: 0.9 % — SIGNIFICANT CHANGE UP (ref 0–0.9)
KETONES UR-MCNC: NEGATIVE — SIGNIFICANT CHANGE UP
LEUKOCYTE ESTERASE UR-ACNC: ABNORMAL
LYMPHOCYTES # BLD AUTO: 2 K/UL — SIGNIFICANT CHANGE UP (ref 1–3.3)
LYMPHOCYTES # BLD AUTO: 21.4 % — SIGNIFICANT CHANGE UP (ref 13–44)
MAGNESIUM SERPL-MCNC: 2 MG/DL — SIGNIFICANT CHANGE UP (ref 1.6–2.6)
MCHC RBC-ENTMCNC: 30.7 PG — SIGNIFICANT CHANGE UP (ref 27–34)
MCHC RBC-ENTMCNC: 32.2 GM/DL — SIGNIFICANT CHANGE UP (ref 32–36)
MCV RBC AUTO: 95.2 FL — SIGNIFICANT CHANGE UP (ref 80–100)
MONOCYTES # BLD AUTO: 0.91 K/UL — HIGH (ref 0–0.9)
MONOCYTES NFR BLD AUTO: 9.8 % — SIGNIFICANT CHANGE UP (ref 2–14)
NEUTROPHILS # BLD AUTO: 6.19 K/UL — SIGNIFICANT CHANGE UP (ref 1.8–7.4)
NEUTROPHILS NFR BLD AUTO: 66.3 % — SIGNIFICANT CHANGE UP (ref 43–77)
NITRITE UR-MCNC: POSITIVE
NRBC # BLD: 0 /100 WBCS — SIGNIFICANT CHANGE UP (ref 0–0)
PH UR: 6 — SIGNIFICANT CHANGE UP (ref 5–8)
PHOSPHATE SERPL-MCNC: 2.8 MG/DL — SIGNIFICANT CHANGE UP (ref 2.5–4.5)
PLATELET # BLD AUTO: 224 K/UL — SIGNIFICANT CHANGE UP (ref 150–400)
POTASSIUM SERPL-MCNC: 3.8 MMOL/L — SIGNIFICANT CHANGE UP (ref 3.5–5.3)
POTASSIUM SERPL-SCNC: 3.8 MMOL/L — SIGNIFICANT CHANGE UP (ref 3.5–5.3)
PROT SERPL-MCNC: 6.1 G/DL — SIGNIFICANT CHANGE UP (ref 6–8.3)
PROT UR-MCNC: 30 MG/DL
RBC # BLD: 2.9 M/UL — LOW (ref 3.8–5.2)
RBC # FLD: 15.3 % — HIGH (ref 10.3–14.5)
SODIUM SERPL-SCNC: 141 MMOL/L — SIGNIFICANT CHANGE UP (ref 135–145)
SP GR SPEC: 1.01 — SIGNIFICANT CHANGE UP (ref 1.01–1.02)
TROPONIN I, HIGH SENSITIVITY RESULT: 1804.8 NG/L — HIGH
TSH SERPL-MCNC: 1.48 UIU/ML — SIGNIFICANT CHANGE UP (ref 0.36–3.74)
UROBILINOGEN FLD QL: NEGATIVE — SIGNIFICANT CHANGE UP
WBC # BLD: 9.33 K/UL — SIGNIFICANT CHANGE UP (ref 3.8–10.5)
WBC # FLD AUTO: 9.33 K/UL — SIGNIFICANT CHANGE UP (ref 3.8–10.5)

## 2023-02-11 PROCEDURE — 99233 SBSQ HOSP IP/OBS HIGH 50: CPT | Mod: GC

## 2023-02-11 PROCEDURE — 99233 SBSQ HOSP IP/OBS HIGH 50: CPT

## 2023-02-11 PROCEDURE — 93010 ELECTROCARDIOGRAM REPORT: CPT

## 2023-02-11 RX ORDER — AMIODARONE HYDROCHLORIDE 400 MG/1
400 TABLET ORAL EVERY 8 HOURS
Refills: 0 | Status: COMPLETED | OUTPATIENT
Start: 2023-02-11 | End: 2023-02-15

## 2023-02-11 RX ORDER — AMIODARONE HYDROCHLORIDE 400 MG/1
TABLET ORAL
Refills: 0 | Status: DISCONTINUED | OUTPATIENT
Start: 2023-02-11 | End: 2023-02-16

## 2023-02-11 RX ORDER — DILTIAZEM HCL 120 MG
2.5 CAPSULE, EXT RELEASE 24 HR ORAL
Qty: 125 | Refills: 0 | Status: DISCONTINUED | OUTPATIENT
Start: 2023-02-11 | End: 2023-02-11

## 2023-02-11 RX ORDER — METOPROLOL TARTRATE 50 MG
12.5 TABLET ORAL
Refills: 0 | Status: DISCONTINUED | OUTPATIENT
Start: 2023-02-11 | End: 2023-02-13

## 2023-02-11 RX ORDER — AMIODARONE HYDROCHLORIDE 400 MG/1
200 TABLET ORAL DAILY
Refills: 0 | Status: DISCONTINUED | OUTPATIENT
Start: 2023-02-15 | End: 2023-02-16

## 2023-02-11 RX ADMIN — HEPARIN SODIUM 0 UNIT(S)/HR: 5000 INJECTION INTRAVENOUS; SUBCUTANEOUS at 06:13

## 2023-02-11 RX ADMIN — HEPARIN SODIUM 650 UNIT(S)/HR: 5000 INJECTION INTRAVENOUS; SUBCUTANEOUS at 01:09

## 2023-02-11 RX ADMIN — Medication 2.5 MG/HR: at 01:27

## 2023-02-11 RX ADMIN — AMIODARONE HYDROCHLORIDE 400 MILLIGRAM(S): 400 TABLET ORAL at 15:00

## 2023-02-11 RX ADMIN — AMIODARONE HYDROCHLORIDE 400 MILLIGRAM(S): 400 TABLET ORAL at 21:36

## 2023-02-11 RX ADMIN — Medication 125 MICROGRAM(S): at 00:14

## 2023-02-11 RX ADMIN — HEPARIN SODIUM 700 UNIT(S)/HR: 5000 INJECTION INTRAVENOUS; SUBCUTANEOUS at 19:29

## 2023-02-11 RX ADMIN — Medication 2.5 MG/HR: at 03:47

## 2023-02-11 RX ADMIN — Medication 12.5 MILLIGRAM(S): at 18:06

## 2023-02-11 RX ADMIN — HEPARIN SODIUM 600 UNIT(S)/HR: 5000 INJECTION INTRAVENOUS; SUBCUTANEOUS at 13:04

## 2023-02-11 RX ADMIN — HEPARIN SODIUM 500 UNIT(S)/HR: 5000 INJECTION INTRAVENOUS; SUBCUTANEOUS at 07:13

## 2023-02-11 NOTE — PROGRESS NOTE ADULT - ASSESSMENT
87 F PMH HTN presents  from [facility] assisted living with low BP and tachycardia, found to be in A fib RVR. Patient was admitted recently here  after an unwitnessed fall, found to have E coli bacteremia and UTI on Jan 14, 23 and discharged 1/20/23. Cardiology was consulted A fib RVR and hypotension     New Onset A fib RVR, HTN  - Tele with A fib -140s converted to SR 80's, continue tele monitoring   - BP improving and stable   - continue metoprolol with hold parameters   - was on Metoprolol at rehab which was missed 2/2 hypotension.    - initially on Cardizem gtt, now on amio load, continue   - continue to monitor routine hemodynamics    - EKG showed A fib RVR @ 132.  - No evidence of any active ischemia   - trops elev, peaked and now downtrending, likely in setting of AFib with RVR   - CHADS VASc : 4   - continue hep gtt, would need to discussion re AC depending on clinical course     - ECHO 01/2023 showed normal LV & RV size and function EF 65%, mild AI, trace MR and TR, trace pericardial effusion.   - No evidence of any meaningful volume overload   - S/p 1L IVF x 1     - Monitor and replete Lytes. Keep K > 4 and Mg > 2  - Will continue to follow.    Arlyn Lord, [facility]  Nurse Practitioner - Cardiology   Spectra #[phone]/ [phone]

## 2023-02-11 NOTE — PROGRESS NOTE ADULT - ASSESSMENT
87y Female with PMHx of HTN, dementia presenting with hypotension and found to be in new-onset Afib with RVR being admitted for rate control.

## 2023-02-11 NOTE — PROGRESS NOTE ADULT - SUBJECTIVE AND OBJECTIVE BOX
[facility] -- Maddie Arevalo,  Jeremi, Cedrick Samson Savella, [place]  Office # [phone]    Follow Up:  Afib with RVR     Subjective/Observations: Patient seen and examined, awake, alert, in ED denies chest pain, dyspnea, palpitations or dizziness, orthopnea and PND. Tolerating room air. hep gtt infusing     REVIEW OF SYSTEMS: All other review of systems is negative unless indicated above  PAST MEDICAL & SURGICAL HISTORY:  Hypertension      Dementia      No significant past surgical history        MEDICATIONS  (STANDING):  aMIOdarone    Tablet   Oral   aMIOdarone    Tablet 400 milliGRAM(s) Oral every 8 hours  heparin  Infusion.  Unit(s)/Hr (6.5 mL/Hr) IV Continuous <Continuous>  metoprolol tartrate 12.5 milliGRAM(s) Oral two times a day    MEDICATIONS  (PRN):  acetaminophen     Tablet .. 650 milliGRAM(s) Oral every 6 hours PRN Temp greater or equal to 38C (100.4F), Mild Pain (1 - 3)  heparin   Injectable 3200 Unit(s) IV Push every 6 hours PRN For aPTT less than 40  melatonin 3 milliGRAM(s) Oral at bedtime PRN Insomnia  ondansetron Injectable 4 milliGRAM(s) IV Push every 8 hours PRN Nausea and/or Vomiting    Allergies    Allergy Status Unknown  No Known Allergies    Intolerances      Vital Signs Last 24 Hrs  T(C): 36.3 (11 Feb 2023 13:14), Max: 37.1 (10 Feb 2023 16:00)  T(F): 97.4 (11 Feb 2023 13:14), Max: 98.7 (10 Feb 2023 16:00)  HR: 82 (11 Feb 2023 13:14) (67 - 100)  BP: 127/60 (11 Feb 2023 13:14) (105/70 - 150/78)  BP(mean): --  RR: 18 (11 Feb 2023 13:14) (18 - 19)  SpO2: 96% (11 Feb 2023 13:14) (96% - 98%)    Parameters below as of 11 Feb 2023 13:14  Patient On (Oxygen Delivery Method): room air      I&O's Summary    Weight (kg): 51 (02-10 @ 22:45)  TELE: SR   80's   PHYSICAL EXAM:  Constitutional: NAD, awake and alert  HEENT: Moist Mucous Membranes, Anicteric  Pulmonary: Non-labored, breath sounds are clear bilaterally, No wheezing, rales or rhonchi  Cardiovascular: rrr,  S1 and S2, No murmurs, rubs, gallops or clicks  Gastrointestinal: Bowel Sounds present, soft, nontender.   Lymph: No peripheral edema. No lymphadenopathy.  Skin: No visible rashes or ulcers.  Psych:  forgetful   LABS: All Labs Reviewed:                        8.9    9.33  )-----------( 224      ( 11 Feb 2023 05:10 )             27.6                         9.9    9.02  )-----------( 283      ( 10 Feb 2023 11:00 )             31.6     11 Feb 2023 05:10    141    |  108    |  20     ----------------------------<  91     3.8     |  26     |  0.87   10 Feb 2023 11:00    138    |  105    |  19     ----------------------------<  106    3.8     |  26     |  0.90     Ca    8.5        11 Feb 2023 05:10  Ca    9.1        10 Feb 2023 11:00  Phos  2.8       11 Feb 2023 05:10  Phos  3.0       10 Feb 2023 22:28  Mg     2.0       11 Feb 2023 05:10  Mg     1.8       10 Feb 2023 22:28    TPro  6.1    /  Alb  2.8    /  TBili  0.3    /  DBili  x      /  AST  17     /  ALT  10     /  AlkPhos  61     11 Feb 2023 05:10  TPro  7.1    /  Alb  3.0    /  TBili  0.5    /  DBili  x      /  AST  12     /  ALT  11     /  AlkPhos  80     10 Feb 2023 11:00    PT/INR - ( 10 Feb 2023 11:00 )   PT: 12.9 sec;   INR: 1.10 ratio         PTT - ( 11 Feb 2023 12:08 )  PTT:46.1 sec  CARDIAC MARKERS ( 11 Feb 2023 05:10 )  x     / x     / 51 U/L / x     / 4.3 ng/mL  CARDIAC MARKERS ( 10 Feb 2023 18:19 )  x     / x     / 46 U/L / x     / 4.4 ng/mL      12 Lead ECG:   Ventricular Rate 82 BPM    Atrial Rate 82 BPM    P-R Interval 128 ms    QRS Duration 92 ms    Q-T Interval 386 ms    QTC Calculation(Bazett) 450 ms    P Axis 40 degrees    R Axis -26 degrees    T Axis 25 degrees    Diagnosis Line Sinus rhythm with premature supraventricular complexes  Otherwise normal ECG  When compared with ECG of 15-VIANEY-2023 03:26,  No significant change was found  Confirmed by Lela Soto ([id number]) on 1/16/2023 9:09:57 AM (01-15-23 @ 09:34)      ACC: [id number] EXAM:  ECHO TTE WO CON COMP W DOPP                          PROCEDURE DATE:  01/17/2023          INTERPRETATION:  INDICATION: Abnormal EKG  Sonographer KL    Blood Pressure 136/81    Height 158 cm     Weight 57 kg       BSA 1.6 sq m    Dimensions:  LA 2.3       Normal Values: 2.0 - 4.0 cm  Ao 3.3        Normal Values: 2.0 - 3.8 cm  SEPTUM 1.2       Normal Values: 0.6 - 1.2 cm  PWT 1.1       Normal Values: 0.6 - 1.1 cm  LVIDd 4.0         Normal Values: 3.0 - 5.6 cm  LVIDs 2.4  Normal Values: 1.8 - 4.0 cm      OBSERVATIONS:  Mitral Valve: normal, trace physiologic MR.  Aortic Valve/Aorta: Sclerotic trileaflet aortic valve. Trace to mild AI  Tricuspid Valve: normal with trace TR.  Pulmonic Valve: Not well-visualized  Left Atrium: normal  Right Atrium: Not well-visualized  Left Ventricle: normal LV size and systolic function, estimated LVEF of   65%.  Right Ventricle: Grossly normal size and systolic function.  Pericardium: Trace pericardial effusion.      IMPRESSION:  Normal left ventricular internal dimensions and systolic function,   estimated LVEF of 65%.  Grossly normal RV size and systolic function.  Sclerotic trileaflet aortic valve, trace to mild AI.  Trace physiologic MR and TR.  Trace pericardial effusion.    --- End of Report ---            LELA SOTO MD; Attending Cardiologist  This document has been electronically signed. Jan 18 2023 12:47PM

## 2023-02-11 NOTE — PROGRESS NOTE ADULT - SUBJECTIVE AND OBJECTIVE BOX
Patient is a 87y old  Female who presents with a chief complaint of Afib with RVR (10 Feb 2023 16:53)      Subjective:  INTERVAL HPI/OVERNIGHT EVENTS: Patient seen and examined at bedside. pt was admitted overnight. this am she is mildly confused. states she does not want to be in the hospital. denies all complaints.     MEDICATIONS  (STANDING):  aMIOdarone    Tablet   Oral   aMIOdarone    Tablet 400 milliGRAM(s) Oral every 8 hours  heparin  Infusion.  Unit(s)/Hr (6.5 mL/Hr) IV Continuous <Continuous>  metoprolol tartrate 12.5 milliGRAM(s) Oral two times a day    MEDICATIONS  (PRN):  acetaminophen     Tablet .. 650 milliGRAM(s) Oral every 6 hours PRN Temp greater or equal to 38C (100.4F), Mild Pain (1 - 3)  heparin   Injectable 3200 Unit(s) IV Push every 6 hours PRN For aPTT less than 40  melatonin 3 milliGRAM(s) Oral at bedtime PRN Insomnia  ondansetron Injectable 4 milliGRAM(s) IV Push every 8 hours PRN Nausea and/or Vomiting      Allergies    Allergy Status Unknown  No Known Allergies    Intolerances        REVIEW OF SYSTEMS:  CONSTITUTIONAL: No fever or chills  HEENT:  No headache, no sore throat  RESPIRATORY: No cough, wheezing, or shortness of breath  CARDIOVASCULAR: No chest pain, palpitations  GASTROINTESTINAL: No abd pain, nausea, vomiting, or diarrhea  GENITOURINARY: No dysuria, frequency, or hematuria  NEUROLOGICAL: no focal weakness or dizziness  MUSCULOSKELETAL: no myalgias     Objective:  Vital Signs Last 24 Hrs  T(C): 36.9 (11 Feb 2023 06:45), Max: 37.1 (10 Feb 2023 16:00)  T(F): 98.4 (11 Feb 2023 06:45), Max: 98.7 (10 Feb 2023 16:00)  HR: 67 (11 Feb 2023 06:45) (67 - 100)  BP: 140/72 (11 Feb 2023 06:45) (105/70 - 150/78)  BP(mean): --  RR: 18 (11 Feb 2023 06:45) (18 - 19)  SpO2: 97% (11 Feb 2023 06:45) (97% - 98%)    Parameters below as of 11 Feb 2023 06:45  Patient On (Oxygen Delivery Method): room air        GENERAL: appears stated age, lying in bed, NAD   HEAD:  Atraumatic, Normocephalic  EYES: EOMI, conjunctiva and sclera clear  ENT: Moist mucous membranes  NECK: Supple, No JVD  CHEST/LUNG: Clear to auscultation bilaterally; No rales, rhonchi, wheezing, or rubs. Unlabored respirations  HEART: Regular rate and rhythm; No murmurs, rubs, or gallops  ABDOMEN: Bowel sounds present; Soft, Nontender, Nondistended  EXTREMITIES:  2+ Peripheral Pulses, brisk capillary refill. No clubbing, cyanosis, or edema  NERVOUS SYSTEM:  Alert & Oriented X2-3, somewhat confused, able to follow commands, speech clear. No deficits   SKIN: warm, dry    LABS:                        8.9    9.33  )-----------( 224      ( 11 Feb 2023 05:10 )             27.6     CBC Full  -  ( 11 Feb 2023 05:10 )  WBC Count : 9.33 K/uL  Hemoglobin : 8.9 g/dL  Hematocrit : 27.6 %  Platelet Count - Automated : 224 K/uL  Mean Cell Volume : 95.2 fl  Mean Cell Hemoglobin : 30.7 pg  Mean Cell Hemoglobin Concentration : 32.2 gm/dL  Auto Neutrophil # : 6.19 K/uL  Auto Lymphocyte # : 2.00 K/uL  Auto Monocyte # : 0.91 K/uL  Auto Eosinophil # : 0.12 K/uL  Auto Basophil # : 0.03 K/uL  Auto Neutrophil % : 66.3 %  Auto Lymphocyte % : 21.4 %  Auto Monocyte % : 9.8 %  Auto Eosinophil % : 1.3 %  Auto Basophil % : 0.3 %    11 Feb 2023 05:10    141    |  108    |  20     ----------------------------<  91     3.8     |  26     |  0.87     Ca    8.5        11 Feb 2023 05:10  Phos  2.8       11 Feb 2023 05:10  Mg     2.0       11 Feb 2023 05:10    TPro  6.1    /  Alb  2.8    /  TBili  0.3    /  DBili  x      /  AST  17     /  ALT  10     /  AlkPhos  61     11 Feb 2023 05:10    PT/INR - ( 10 Feb 2023 11:00 )   PT: 12.9 sec;   INR: 1.10 ratio         PTT - ( 11 Feb 2023 05:10 )  PTT:116.4 sec    CAPILLARY BLOOD GLUCOSE              RADIOLOGY & ADDITIONAL TESTS:    Personally reviewed.

## 2023-02-11 NOTE — PROGRESS NOTE ADULT - PROBLEM SELECTOR PLAN 3
Likely anemia of chronic disease  - Hgb 9.9 on admission, was in the ranges of ~10s during last hospitalization in 01/2023  - Currently hemodynamically stable  - F/u iron panel: iron, ferritin, TIBC, transferrin  - F/u folate, vitamin B12  - Trend CBC

## 2023-02-11 NOTE — DISCHARGE NOTE PROVIDER - PROVIDER TOKENS
PROVIDER:[TOKEN:[[id number]]],PROVIDER:[TOKEN:[[id number]]] PROVIDER:[TOKEN:[[id number]],FOLLOWUP:[1 week]],PROVIDER:[TOKEN:[[id number]],FOLLOWUP:[1 week]]

## 2023-02-11 NOTE — PROGRESS NOTE ADULT - PROBLEM SELECTOR PLAN 1
Pt presents with new onset Afib RVR up to 140s and hypotension  - In the ED: 1L NS bolus, 1x Lopressor 2.5mg IVP, Cardizem gtt 5mL/hr, Lovenox 50mg, Dig 500mcg IVP  - EKG on admission: Afib with RVR 132bpm, PVCs  - initially held home Metoprolol 12.5 mg BID due to low blood pressures; pt missed dose at rehab due to hypotension. now restarted  - pt started on dig however after discussion with cardiology pt would benefit more from amiodarone- d/c digoxin and start amiodarone load.   -  patient son Tomer and Joshua who are the medical decision makers initially refused blood thinner however given pt also with elevated troponin they agreed to starting hep gtt. continue for now. will need further discussion with family regarding A/c going forward.   - troponins trended up overnight- now peaked. 2/2 demand ischemia in setting of rapid afib.   - F/u TSH, Mg, Phos  - TTE 1/2023 showed normal LV & RV size and function EF 65%, mild AI, trace MR and TR, trace pericardial effusion.   - [id number] score: 4  - Monitor lytes and replete as needed  - Telemonitoring  - Cardiology Dr. Philip consulted, recs appreciated

## 2023-02-11 NOTE — ED ADULT NURSE REASSESSMENT NOTE - NS ED NURSE REASSESS COMMENT FT1
laB: TROP 65.1  CECE [place] MD NOTIFIED, PENDING ACTION.
lab: trop, 1179.9  ethel fulton MD notified, pending actions
Assumed care of Pt from previous RN, PT started on heparin gtt, Cardizem gtt infusing, Pt now normotensive, Pt sitting comfortably on stretcher, playing game on tablet, awaiting admission bed placement, will continue to monitor closely.

## 2023-02-11 NOTE — DISCHARGE NOTE PROVIDER - NSFTFHOMEHTHYNRD_GEN_ALL_CORE
Eyes with no visual disturbances.  Ears clean and dry and no hearing difficulties. Nose with pink mucosa and no drainage.  Mouth mucous membranes moist and pink.  No tenderness or swelling to throat or neck. Yes

## 2023-02-11 NOTE — DISCHARGE NOTE PROVIDER - NSDCMRMEDTOKEN_GEN_ALL_CORE_FT
acetaminophen 500 mg oral tablet: 2 tab(s) orally every 8 hours, As Needed  metoprolol tartrate: 12.5 milligram(s) orally every 12 hours  Preparation H Hydrocortisone 1% topical cream: Apply topically to affected area once a day, As Needed   acetaminophen 500 mg oral tablet: 2 tab(s) orally every 8 hours, As Needed  amiodarone 200 mg oral tablet: 1 tab(s) orally once a day  amLODIPine 5 mg oral tablet: 1 tab(s) orally once a day  metoprolol tartrate: 12.5 milligram(s) orally every 12 hours  Preparation H Hydrocortisone 1% topical cream: Apply topically to affected area once a day, As Needed   Preparation H Hydrocortisone 1% topical cream: Apply topically to affected area once a day, As Needed   amiodarone 200 mg oral tablet: 1 tab(s) orally once a day  amLODIPine 5 mg oral tablet: 1 tab(s) orally once a day  apixaban 2.5 mg oral tablet: 1 tab(s) orally every 12 hours  metoprolol tartrate 25 mg oral tablet: 1 tab(s) orally 2 times a day  Preparation H Hydrocortisone 1% topical cream: Apply topically to affected area once a day, As Needed

## 2023-02-11 NOTE — DISCHARGE NOTE PROVIDER - NSDCCPCAREPLAN_GEN_ALL_CORE_FT
PRINCIPAL DISCHARGE DIAGNOSIS  Diagnosis: New onset atrial fibrillation  Assessment and Plan of Treatment: You presented to the hospital with a fast heart rate, and EKG showed that you were experiencing atrial fibrillation. Atrial fibrillation is when the heart does not beat regularly, which can increase the risk for clot formation and future strokes. You were started on medications to better control your heart rate as well as a blood thinner to decrease the likelihood of clot formation. A cardiologist has been following you in your care while at the hospital and your medication regimen has been optimized.  You are now ready for discharge. Please continue to take ****** which will be sent to your pharmacy, and follow up with your outpatient cardiologist to monitor and manage your condition.      SECONDARY DISCHARGE DIAGNOSES  Diagnosis: Small pleural effusion  Assessment and Plan of Treatment: Imaging showed a small pleural effusion, or fluid surrouding your lungs. However, you showed no signs of respiratory distress and tolerated room air without requiring additional oxygen.    Diagnosis: Anemia  Assessment and Plan of Treatment: You were noted to have anemia, or decreased blood levels, on admission. No signs of acute bleeding were seen, and a fecal blood test was *****.     PRINCIPAL DISCHARGE DIAGNOSIS  Diagnosis: New onset atrial fibrillation  Assessment and Plan of Treatment: You presented to the hospital with a fast heart rate, and EKG showed that you were experiencing atrial fibrillation. Atrial fibrillation is when the heart does not beat regularly, which can increase the risk for clot formation and future strokes. You were started on medications to better control your heart rate as well as a blood thinner to decrease the likelihood of clot formation. A cardiologist has been following you in your care while at the hospital and your medication regimen has been optimized.  - Please continue to take Amiodarone 200 mg daily, Metoprolol ----- and ----- ------------------  - Please follow up with your outpatient cardiologist to monitor and manage your condition.      SECONDARY DISCHARGE DIAGNOSES  Diagnosis: Small pleural effusion  Assessment and Plan of Treatment: Imaging showed a small pleural effusion, or fluid surrouding your lungs. However, you showed no signs of respiratory distress and tolerated room air without requiring additional oxygen.    Diagnosis: Anemia  Assessment and Plan of Treatment: You were noted to have anemia, or decreased blood levels, on admission. No signs of acute bleeding were seen.  - Please follow up with your PCP within one week of discharge to get repeat bloodwork in order to monitor your hemoglobin.    Diagnosis: Hypertension  Assessment and Plan of Treatment: You were previously diagnosed with hypertension.   - Your home Metoprolol was increased to 25 mg twice a day and you were started on Amlodipine 5 mg daily.   - Please follow up with your PCP and Cardiologist within 1 week of discharge to monitor your blood pressure and medication management.     PRINCIPAL DISCHARGE DIAGNOSIS  Diagnosis: New onset atrial fibrillation  Assessment and Plan of Treatment: You presented to the hospital with a fast heart rate, and EKG showed that you were experiencing atrial fibrillation. Atrial fibrillation is when the heart does not beat regularly, which can increase the risk for clot formation and future strokes. You were started on medications to better control your heart rate as well as a blood thinner to decrease the likelihood of clot formation. A cardiologist has been following you in your care while at the hospital and your medication regimen has been optimized.  - Please continue to take Amiodarone 200 mg daily, a aprescription was sent to your pharmacy.   - Please STOP taking your home Metoprolol 12.5 mg and START taking increase dose of Metoprolol 25 mg twice a day for better blood pressure control, a prescription was sent to your pharmacy.  - Please follow up with your outpatient cardiologist to monitor and manage your condition.      SECONDARY DISCHARGE DIAGNOSES  Diagnosis: Hypertension  Assessment and Plan of Treatment: You were previously diagnosed with hypertension.   - Your home Metoprolol was increased to 25 mg twice a day and you were started on Amlodipine 5 mg daily.   - Please CONTINUE Amlodipine 5 mg daily, a prescription was sent to your pharmacy.   - Please STOP taking your home Metoprolol 12.5 mg and START taking increase dose of Metoprolol 25 mg twice a day for better blood pressure control, a prescription was sent to your pharmacy.  - Please CONTINUE taking your blood thinner Eliquis 2.5 mg twice a day, a prescription was sent to your pharmacy.   - Please follow up with your PCP and Cardiologist within 1 week of discharge to monitor your blood pressure and medication management.    Diagnosis: Small pleural effusion  Assessment and Plan of Treatment: Imaging showed a small pleural effusion, or fluid surrouding your lungs. However, you showed no signs of respiratory distress and tolerated room air without requiring additional oxygen.  - Please follow up with your PCP within one week of discharge to monitor your symptoms.    Diagnosis: Acute UTI  Assessment and Plan of Treatment: You were found to have a urinary tract infection, you completed a course of IV antibiotics and improved.  - Please follow up with your PCP within one week of discharge to monitor your symptoms.       Diagnosis: Anemia  Assessment and Plan of Treatment: You were noted to have anemia, or decreased blood levels, on admission. No signs of acute bleeding were seen.  - Please follow up with your PCP within one week of discharge to get repeat bloodwork in order to monitor your hemoglobin.

## 2023-02-11 NOTE — DISCHARGE NOTE PROVIDER - CARE PROVIDER_API CALL
Vinicio Florentino  CARDIOLOGY  [address] [place] [address]  Phone: [phone]  Fax: [phone]  Follow Up Time:     Bre Cadena ()  Family Medicine  [address] [place] [address]  Phone: [phone]  Fax: [phone]  Follow Up Time:    Vinicio Florentino  CARDIOLOGY  [address] [place] [address]  Phone: [phone]  Fax: [phone]  Follow Up Time: 1 week    Bre Cadena ()  Family Medicine  [address] [place] [address]  Phone: [phone]  Fax: [phone]  Follow Up Time: 1 week

## 2023-02-11 NOTE — DISCHARGE NOTE PROVIDER - HOSPITAL COURSE
FROM ADMISSION H+P:   HPI:  The patient is a 87y Female with PMHx of HTN, dementia presenting with hypotension and found to be in new-onset Afib with RVR. Patient is A&Ox2, however is an unreliable historian with poor memory. Son (Tomer) by bedside provided history. Patient came from [facility] assisted living. During routine vitals check, was found to be hypotensive and her metoprolol was held. Pt was sent to the ED, where she was found to be tachy on the ambulance ride. In the ED, pt was found to be in Afib with RVR. Pt herself had no complaints but did note having some lightheadedness earlier in the ED. Cardio was consulted and pt needed to be started on a Cardizem gtt. Of note, pt was recently hospitalized 1/14/23 - 1/20/23 for UTI and bacteremia - was discharged to [facility] rehab and then subsequently to [facility].    In the ED,  VS: T 96.3F, , /74, RR 18, SpO2 97% on RA  Labs: Hgb 9.9, Albumin 3.0, Trop 61.5  Imaging: CXR - Left costophrenic angle is blunted suggesting small pleural effusion.  EKG: Afib with RVR 132bpm, PVCs  Received: 1L NS bolus, 1x Lopressor 2.5mg IVP, Cardizem gtt 5mL/hr, Lovenox 50mg, Dig 500mcg IVP (10 Feb 2023 16:53)      ---  HOSPITAL COURSE/PERTINENT LABS/PROCEDURES PERFORMED/PENDING TESTS:  Patient admitted for new-onset afib with RVR. Patient started on Digoxin to allow for rate control, which was later switched to Amiodarone. Home Metoprolol initially held due to hypotension, but was subsequently restarted given normalization of BP. Patient noted to have elevated troponins, started on Heparin drip which family agreed to. Patient noted to have anemia on admission which was likely related to ACD, however FOBT was ordered which came back ****.    Patient seen and examined on the day of discharge. Patient is clinically stable and medically optimized for discharge to ____________ with close outpatient follow-up.     ---  PATIENT CONDITION:  - stable    ---  PHYSICAL EXAM ON DAY OF DISCHARGE:    ---  CONSULTANTS:   Cardio: Dr. Esther Philip    ---  ADVANCED CARE PLANNING:  - Code status:    Full code  - MOLST completed:      [ X ] NO     [  ] YES    ---  TIME SPENT:  I, the attending physician, was physically present for the key portions of the evaluation and management (E/M) service provided. The total amount of time spent reviewing the hospital notes, laboratory values, imaging findings, assessing/counseling the patient, discussing with consultant physicians, social work, nursing staff was -- minutes FROM ADMISSION H+P:   HPI:  The patient is a 87y Female with PMHx of HTN, dementia presenting with hypotension and found to be in new-onset Afib with RVR. Patient is A&Ox2, however is an unreliable historian with poor memory. Son (Tomer) by bedside provided history. Patient came from [facility] assisted living. During routine vitals check, was found to be hypotensive and her metoprolol was held. Pt was sent to the ED, where she was found to be tachy on the ambulance ride. In the ED, pt was found to be in Afib with RVR. Pt herself had no complaints but did note having some lightheadedness earlier in the ED. Cardio was consulted and pt needed to be started on a Cardizem gtt. Of note, pt was recently hospitalized 1/14/23 - 1/20/23 for UTI and bacteremia - was discharged to [facility] rehab and then subsequently to [facility].    In the ED,  VS: T 96.3F, , /74, RR 18, SpO2 97% on RA  Labs: Hgb 9.9, Albumin 3.0, Trop 61.5  Imaging: CXR - Left costophrenic angle is blunted suggesting small pleural effusion.  EKG: Afib with RVR 132bpm, PVCs  Received: 1L NS bolus, 1x Lopressor 2.5mg IVP, Cardizem gtt 5mL/hr, Lovenox 50mg, Dig 500mcg IVP (10 Feb 2023 16:53)      ---  HOSPITAL COURSE/PERTINENT LABS/PROCEDURES PERFORMED/PENDING TESTS:  Patient admitted for new-onset afib with RVR. Patient started on Digoxin to allow for rate control, which was later switched to Amiodarone. Home Metoprolol initially held due to hypotension, but was subsequently restarted given normalization of BP though increased to 25mg BID for better control. Patient noted to have elevated troponins, started on Heparin drip which family agreed to. Patient noted to have anemia on admission which was likely related to ACD, however FOBT was ordered which came back ****.    Patient seen and examined on the day of discharge. Patient is clinically stable and medically optimized for discharge to ____________ with close outpatient follow-up.     ---  PATIENT CONDITION:  - stable    ---  PHYSICAL EXAM ON DAY OF DISCHARGE:    ---  CONSULTANTS:   Cardio: Dr. Esther Philip    ---  ADVANCED CARE PLANNING:  - Code status:    Full code  - MOLST completed:      [ X ] NO     [  ] YES    ---  TIME SPENT:  I, the attending physician, was physically present for the key portions of the evaluation and management (E/M) service provided. The total amount of time spent reviewing the hospital notes, laboratory values, imaging findings, assessing/counseling the patient, discussing with consultant physicians, social work, nursing staff was -- minutes FROM ADMISSION H+P:   HPI:  The patient is a 87y Female with PMHx of HTN, dementia presenting with hypotension and found to be in new-onset Afib with RVR. Patient is A&Ox2, however is an unreliable historian with poor memory. Son (Tomer) by bedside provided history. Patient came from [facility] assisted living. During routine vitals check, was found to be hypotensive and her metoprolol was held. Pt was sent to the ED, where she was found to be tachy on the ambulance ride. In the ED, pt was found to be in Afib with RVR. Pt herself had no complaints but did note having some lightheadedness earlier in the ED. Cardio was consulted and pt needed to be started on a Cardizem gtt. Of note, pt was recently hospitalized 1/14/23 - 1/20/23 for UTI and bacteremia - was discharged to [facility] rehab and then subsequently to [facility].    In the ED,  VS: T 96.3F, , /74, RR 18, SpO2 97% on RA  Labs: Hgb 9.9, Albumin 3.0, Trop 61.5  Imaging: CXR - Left costophrenic angle is blunted suggesting small pleural effusion.  EKG: Afib with RVR 132bpm, PVCs  Received: 1L NS bolus, 1x Lopressor 2.5mg IVP, Cardizem gtt 5mL/hr, Lovenox 50mg, Dig 500mcg IVP (10 Feb 2023 16:53)      ---  HOSPITAL COURSE/PERTINENT LABS/PROCEDURES PERFORMED/PENDING TESTS:  Patient admitted for new-onset afib with RVR. Patient started on Digoxin to allow for rate control, which was later switched to Amiodarone. Home Metoprolol initially held due to hypotension, but was subsequently restarted given normalization of BP though increased to 25mg BID for better control. Amlodipine 5 was added 2/14 for increased BP control. Patient noted to have elevated troponins, started on Heparin drip which family agreed to. Patient noted to have anemia on admission which was likely related to ACD, however FOBT was ordered which came back ****. Troponins peaks and started to downtrend while pt was asymptomatic.     Patient seen and examined on the day of discharge. Patient is clinically stable and medically optimized for discharge to ____________ with close outpatient follow-up.     ---  PATIENT CONDITION:  - stable    ---  PHYSICAL EXAM ON DAY OF DISCHARGE:    ---  CONSULTANTS:   Cardio: Dr. Esther Philip    ---  ADVANCED CARE PLANNING:  - Code status:    Full code  - MOLST completed:      [ X ] NO     [  ] YES    ---  TIME SPENT:  I, the attending physician, was physically present for the key portions of the evaluation and management (E/M) service provided. The total amount of time spent reviewing the hospital notes, laboratory values, imaging findings, assessing/counseling the patient, discussing with consultant physicians, social work, nursing staff was -- minutes FROM ADMISSION H+P:   HPI:  The patient is a 87y Female with PMHx of HTN, dementia presenting with hypotension and found to be in new-onset Afib with RVR. Patient is A&Ox2, however is an unreliable historian with poor memory. Son (Tomer) by bedside provided history. Patient came from [facility] assisted living. During routine vitals check, was found to be hypotensive and her metoprolol was held. Pt was sent to the ED, where she was found to be tachy on the ambulance ride. In the ED, pt was found to be in Afib with RVR. Pt herself had no complaints but did note having some lightheadedness earlier in the ED. Cardio was consulted and pt needed to be started on a Cardizem gtt. Of note, pt was recently hospitalized 1/14/23 - 1/20/23 for UTI and bacteremia - was discharged to [facility] rehab and then subsequently to [facility].    In the ED,  VS: T 96.3F, , /74, RR 18, SpO2 97% on RA  Labs: Hgb 9.9, Albumin 3.0, Trop 61.5  Imaging: CXR - Left costophrenic angle is blunted suggesting small pleural effusion.  EKG: Afib with RVR 132bpm, PVCs  Received: 1L NS bolus, 1x Lopressor 2.5mg IVP, Cardizem gtt 5mL/hr, Lovenox 50mg, Dig 500mcg IVP (10 Feb 2023 16:53)      ---  HOSPITAL COURSE/PERTINENT LABS/PROCEDURES PERFORMED/PENDING TESTS:  Patient admitted for new-onset afib with RVR. Patient started on Digoxin to allow for rate control, which was later switched to Amiodarone. Home Metoprolol initially held due to hypotension, but was subsequently restarted given normalization of BP though increased to 25mg BID for better control. Amlodipine 5 mg was added 2/14 for increased BP control. Patient noted to have elevated troponins, started on Heparin drip which family agreed to. Patient noted to have anemia on admission which was likely related to ACD. Troponins peaks and started to downtrend while pt was asymptomatic.     UPDATED 2/15    Patient is stable for discharge as per primary medical team and consultants.    PT consulted, recommends discharge with home PT.     Patient showed improvement throughout hospitalization. Patient was seen and examined on day of discharge. Patient was medically optimized for discharge with close outpatient follow up.    ---  PATIENT CONDITION:  - stable    --  VITALS:   T(C): 36.6 (02-15-23 @ 03:41), Max: 36.7 (02-14-23 @ 20:11)  HR: 57 (02-15-23 @ 03:41) (57 - 64)  BP: 174/83 (02-15-23 @ 03:41) (162/78 - 174/83)  RR: 17 (02-15-23 @ 03:41) (17 - 18)  SpO2: 98% (02-15-23 @ 03:41) (95% - 98%)    PHYSICAL EXAM ON DAY OF DISCHARGE:      ---  CONSULTANTS:   Cardio: Dr. Esther Philip    ---  ADVANCED CARE PLANNING:  - Code status:    Full code  - MOLST completed:      [ X ] NO     [  ] YES    ---  TIME SPENT:  I, the attending physician, was physically present for the key portions of the evaluation and management (E/M) service provided. The total amount of time spent reviewing the hospital notes, laboratory values, imaging findings, assessing/counseling the patient, discussing with consultant physicians, social work, nursing staff was -- minutes FROM ADMISSION H+P:   HPI:  The patient is a 87y Female with PMHx of HTN, dementia presenting with hypotension and found to be in new-onset Afib with RVR. Patient is A&Ox2, however is an unreliable historian with poor memory. Son (Tomer) by bedside provided history. Patient came from [facility] assisted living. During routine vitals check, was found to be hypotensive and her metoprolol was held. Pt was sent to the ED, where she was found to be tachy on the ambulance ride. In the ED, pt was found to be in Afib with RVR. Pt herself had no complaints but did note having some lightheadedness earlier in the ED. Cardio was consulted and pt needed to be started on a Cardizem gtt. Of note, pt was recently hospitalized 1/14/23 - 1/20/23 for UTI and bacteremia - was discharged to [facility] rehab and then subsequently to [facility].    In the ED,  VS: T 96.3F, , /74, RR 18, SpO2 97% on RA  Labs: Hgb 9.9, Albumin 3.0, Trop 61.5  Imaging: CXR - Left costophrenic angle is blunted suggesting small pleural effusion.  EKG: Afib with RVR 132bpm, PVCs  Received: 1L NS bolus, 1x Lopressor 2.5mg IVP, Cardizem gtt 5mL/hr, Lovenox 50mg, Dig 500mcg IVP (10 Feb 2023 16:53)      ---  HOSPITAL COURSE/PERTINENT LABS/PROCEDURES PERFORMED/PENDING TESTS:  Patient admitted for new-onset afib with RVR. Patient started on Digoxin to allow for rate control, which was later switched to Amiodarone. Home Metoprolol initially held due to hypotension, but was subsequently restarted given normalization of BP though increased to 25mg BID for better control. Amlodipine 5 mg was added 2/14 for increased BP control. Patient noted to have elevated troponins, started on Heparin drip which family agreed to. Patient noted to have anemia on admission which was likely related to ACD. Troponins peaks and started to downtrend while pt was asymptomatic.     Patient is stable for discharge as per primary medical team and consultants.    PT consulted, recommends discharge with home PT.     Patient showed improvement throughout hospitalization. Patient was seen and examined on day of discharge. Patient was medically optimized for discharge with close outpatient follow up.    ---  PATIENT CONDITION:  - stable    --  Vital Signs Last 24 Hrs  T(C): 36.3 (16 Feb 2023 04:52), Max: 36.7 (15 Feb 2023 12:03)  T(F): 97.4 (16 Feb 2023 04:52), Max: 98.1 (15 Feb 2023 12:03)  HR: 62 (16 Feb 2023 04:52) (62 - 68)  BP: 154/81 (16 Feb 2023 04:52) (154/81 - 167/72)  BP(mean): --  RR: 18 (16 Feb 2023 04:52) (18 - 18)  SpO2: 95% (16 Feb 2023 04:52) (95% - 98%)    Parameters below as of 16 Feb 2023 04:52  Patient On (Oxygen Delivery Method): room air        PHYSICAL EXAM ON DAY OF DISCHARGE:  GENERAL: NAD  HEENT:  anicteric, moist mucous membranes  CHEST/LUNG:  CTA b/l, no rales, wheezes, or rhonchi  HEART:  RRR, S1, S2  ABDOMEN:  BS+, soft, nontender, nondistended  EXTREMITIES: no edema, cyanosis, or calf tenderness  NERVOUS SYSTEM: answers questions and follows commands appropriately    ---  CONSULTANTS:   Cardio: Dr. Esther Philip    ---  ADVANCED CARE PLANNING:  - Code status:    Full code  - MOLST completed:      [ X ] NO     [  ] YES    ---  TIME SPENT:  I, the attending physician, was physically present for the key portions of the evaluation and management (E/M) service provided. The total amount of time spent reviewing the hospital notes, laboratory values, imaging findings, assessing/counseling the patient, discussing with consultant physicians, social work, nursing staff was -- minutes FROM ADMISSION H+P:   HPI:  The patient is a 87y Female with PMHx of HTN, dementia presenting with hypotension and found to be in new-onset Afib with RVR. Patient is A&Ox2, however is an unreliable historian with poor memory. Son (Tomer) by bedside provided history. Patient came from [facility] assisted living. During routine vitals check, was found to be hypotensive and her metoprolol was held. Pt was sent to the ED, where she was found to be tachy on the ambulance ride. In the ED, pt was found to be in Afib with RVR. Pt herself had no complaints but did note having some lightheadedness earlier in the ED. Cardio was consulted and pt needed to be started on a Cardizem gtt. Of note, pt was recently hospitalized 1/14/23 - 1/20/23 for UTI and bacteremia - was discharged to [facility] rehab and then subsequently to [facility].    In the ED,  VS: T 96.3F, , /74, RR 18, SpO2 97% on RA  Labs: Hgb 9.9, Albumin 3.0, Trop 61.5  Imaging: CXR - Left costophrenic angle is blunted suggesting small pleural effusion.  EKG: Afib with RVR 132bpm, PVCs  Received: 1L NS bolus, 1x Lopressor 2.5mg IVP, Cardizem gtt 5mL/hr, Lovenox 50mg, Dig 500mcg IVP (10 Feb 2023 16:53)      ---  HOSPITAL COURSE/PERTINENT LABS/PROCEDURES PERFORMED/PENDING TESTS:  Patient admitted for new-onset afib with RVR. Patient started on Digoxin to allow for rate control, which was later switched to Amiodarone. Home Metoprolol initially held due to hypotension, but was subsequently restarted given normalization of BP though increased to 25mg BID for better control. Amlodipine 5 mg was added 2/14 for increased BP control. Patient noted to have elevated troponins, started on Heparin drip which family agreed to. Patient noted to have anemia on admission which was likely related to ACD. Troponins peaks and started to downtrend while pt was asymptomatic.     Patient is stable for discharge as per primary medical team and consultants.    PT consulted, recommends discharge with home PT.     Patient showed improvement throughout hospitalization. Patient was seen and examined on day of discharge. Patient was medically optimized for discharge with close outpatient follow up.    ---  PATIENT CONDITION:  - stable    --  Vital Signs Last 24 Hrs  T(C): 36.3 (16 Feb 2023 04:52), Max: 36.7 (15 Feb 2023 12:03)  T(F): 97.4 (16 Feb 2023 04:52), Max: 98.1 (15 Feb 2023 12:03)  HR: 62 (16 Feb 2023 04:52) (62 - 68)  BP: 154/81 (16 Feb 2023 04:52) (154/81 - 167/72)  BP(mean): --  RR: 18 (16 Feb 2023 04:52) (18 - 18)  SpO2: 95% (16 Feb 2023 04:52) (95% - 98%)    Parameters below as of 16 Feb 2023 04:52  Patient On (Oxygen Delivery Method): room air        PHYSICAL EXAM ON DAY OF DISCHARGE:  GENERAL: NAD  HEENT:  anicteric, moist mucous membranes  CHEST/LUNG:  CTA b/l, no rales, wheezes, or rhonchi  HEART:  RRR, S1, S2  ABDOMEN:  BS+, soft, nontender, nondistended  EXTREMITIES: no edema, cyanosis, or calf tenderness  NERVOUS SYSTEM: answers questions and follows commands appropriately    ---  CONSULTANTS:   Cardio: Dr. Esther Philip    ---  ADVANCED CARE PLANNING:  - Code status:    Full code  - MOLST completed:      [ X ] NO     [  ] YES    ---  TIME SPENT:  I, the attending physician, was physically present for the key portions of the evaluation and management (E/M) service provided. The total amount of time spent reviewing the hospital notes, laboratory values, imaging findings, assessing/counseling the patient, discussing with consultant physicians, social work, nursing staff was 37 minutes

## 2023-02-12 DIAGNOSIS — N39.0 URINARY TRACT INFECTION, SITE NOT SPECIFIED: ICD-10-CM

## 2023-02-12 LAB
ALBUMIN SERPL ELPH-MCNC: 2.8 G/DL — LOW (ref 3.3–5)
ALP SERPL-CCNC: 68 U/L — SIGNIFICANT CHANGE UP (ref 40–120)
ALT FLD-CCNC: 12 U/L — SIGNIFICANT CHANGE UP (ref 12–78)
ANION GAP SERPL CALC-SCNC: 5 MMOL/L — SIGNIFICANT CHANGE UP (ref 5–17)
APTT BLD: 62.4 SEC — HIGH (ref 27.5–35.5)
APTT BLD: 64 SEC — HIGH (ref 27.5–35.5)
AST SERPL-CCNC: 13 U/L — LOW (ref 15–37)
BILIRUB SERPL-MCNC: 0.4 MG/DL — SIGNIFICANT CHANGE UP (ref 0.2–1.2)
BUN SERPL-MCNC: 15 MG/DL — SIGNIFICANT CHANGE UP (ref 7–23)
CALCIUM SERPL-MCNC: 8.5 MG/DL — SIGNIFICANT CHANGE UP (ref 8.5–10.1)
CHLORIDE SERPL-SCNC: 105 MMOL/L — SIGNIFICANT CHANGE UP (ref 96–108)
CO2 SERPL-SCNC: 30 MMOL/L — SIGNIFICANT CHANGE UP (ref 22–31)
CREAT SERPL-MCNC: 0.75 MG/DL — SIGNIFICANT CHANGE UP (ref 0.5–1.3)
EGFR: 77 ML/MIN/1.73M2 — SIGNIFICANT CHANGE UP
GLUCOSE SERPL-MCNC: 84 MG/DL — SIGNIFICANT CHANGE UP (ref 70–99)
HCT VFR BLD CALC: 31.8 % — LOW (ref 34.5–45)
HGB BLD-MCNC: 10 G/DL — LOW (ref 11.5–15.5)
MAGNESIUM SERPL-MCNC: 2 MG/DL — SIGNIFICANT CHANGE UP (ref 1.6–2.6)
MCHC RBC-ENTMCNC: 29.9 PG — SIGNIFICANT CHANGE UP (ref 27–34)
MCHC RBC-ENTMCNC: 31.4 GM/DL — LOW (ref 32–36)
MCV RBC AUTO: 95.2 FL — SIGNIFICANT CHANGE UP (ref 80–100)
NRBC # BLD: 0 /100 WBCS — SIGNIFICANT CHANGE UP (ref 0–0)
PHOSPHATE SERPL-MCNC: 2.3 MG/DL — LOW (ref 2.5–4.5)
PLATELET # BLD AUTO: 262 K/UL — SIGNIFICANT CHANGE UP (ref 150–400)
POTASSIUM SERPL-MCNC: 3.7 MMOL/L — SIGNIFICANT CHANGE UP (ref 3.5–5.3)
POTASSIUM SERPL-SCNC: 3.7 MMOL/L — SIGNIFICANT CHANGE UP (ref 3.5–5.3)
PROT SERPL-MCNC: 6.8 G/DL — SIGNIFICANT CHANGE UP (ref 6–8.3)
RBC # BLD: 3.34 M/UL — LOW (ref 3.8–5.2)
RBC # FLD: 15.2 % — HIGH (ref 10.3–14.5)
SODIUM SERPL-SCNC: 140 MMOL/L — SIGNIFICANT CHANGE UP (ref 135–145)
WBC # BLD: 8.07 K/UL — SIGNIFICANT CHANGE UP (ref 3.8–10.5)
WBC # FLD AUTO: 8.07 K/UL — SIGNIFICANT CHANGE UP (ref 3.8–10.5)

## 2023-02-12 PROCEDURE — 99233 SBSQ HOSP IP/OBS HIGH 50: CPT | Mod: GC

## 2023-02-12 PROCEDURE — 99232 SBSQ HOSP IP/OBS MODERATE 35: CPT

## 2023-02-12 RX ORDER — CEFTRIAXONE 500 MG/1
1000 INJECTION, POWDER, FOR SOLUTION INTRAMUSCULAR; INTRAVENOUS EVERY 24 HOURS
Refills: 0 | Status: COMPLETED | OUTPATIENT
Start: 2023-02-12 | End: 2023-02-14

## 2023-02-12 RX ORDER — POTASSIUM PHOSPHATE, MONOBASIC POTASSIUM PHOSPHATE, DIBASIC 236; 224 MG/ML; MG/ML
15 INJECTION, SOLUTION INTRAVENOUS ONCE
Refills: 0 | Status: COMPLETED | OUTPATIENT
Start: 2023-02-12 | End: 2023-02-12

## 2023-02-12 RX ADMIN — HEPARIN SODIUM 700 UNIT(S)/HR: 5000 INJECTION INTRAVENOUS; SUBCUTANEOUS at 07:28

## 2023-02-12 RX ADMIN — POTASSIUM PHOSPHATE, MONOBASIC POTASSIUM PHOSPHATE, DIBASIC 62.5 MILLIMOLE(S): 236; 224 INJECTION, SOLUTION INTRAVENOUS at 11:25

## 2023-02-12 RX ADMIN — AMIODARONE HYDROCHLORIDE 400 MILLIGRAM(S): 400 TABLET ORAL at 21:30

## 2023-02-12 RX ADMIN — AMIODARONE HYDROCHLORIDE 400 MILLIGRAM(S): 400 TABLET ORAL at 14:29

## 2023-02-12 RX ADMIN — Medication 12.5 MILLIGRAM(S): at 05:11

## 2023-02-12 RX ADMIN — CEFTRIAXONE 100 MILLIGRAM(S): 500 INJECTION, POWDER, FOR SOLUTION INTRAMUSCULAR; INTRAVENOUS at 10:38

## 2023-02-12 RX ADMIN — HEPARIN SODIUM 700 UNIT(S)/HR: 5000 INJECTION INTRAVENOUS; SUBCUTANEOUS at 09:54

## 2023-02-12 RX ADMIN — HEPARIN SODIUM 700 UNIT(S)/HR: 5000 INJECTION INTRAVENOUS; SUBCUTANEOUS at 19:34

## 2023-02-12 RX ADMIN — HEPARIN SODIUM 700 UNIT(S)/HR: 5000 INJECTION INTRAVENOUS; SUBCUTANEOUS at 01:39

## 2023-02-12 RX ADMIN — HEPARIN SODIUM 700 UNIT(S)/HR: 5000 INJECTION INTRAVENOUS; SUBCUTANEOUS at 08:54

## 2023-02-12 RX ADMIN — AMIODARONE HYDROCHLORIDE 400 MILLIGRAM(S): 400 TABLET ORAL at 05:11

## 2023-02-12 RX ADMIN — Medication 12.5 MILLIGRAM(S): at 17:55

## 2023-02-12 NOTE — PROGRESS NOTE ADULT - SUBJECTIVE AND OBJECTIVE BOX
Patient is a 87y old  Female who presents with a chief complaint of Afib with RVR (2023 10:02)      INTERVAL HPI/OVERNIGHT EVENTS: Patient seen and examined at bedside. No overnight events occurred. Patient states she feels well and doesn't have any pain anywhere. Denies fevers, chills, headache, lightheadedness, chest pain, dyspnea, abdominal pain, n/v/d/c.    MEDICATIONS  (STANDING):  aMIOdarone    Tablet   Oral   aMIOdarone    Tablet 400 milliGRAM(s) Oral every 8 hours  cefTRIAXone   IVPB 1000 milliGRAM(s) IV Intermittent every 24 hours  heparin  Infusion.  Unit(s)/Hr (6.5 mL/Hr) IV Continuous <Continuous>  metoprolol tartrate 12.5 milliGRAM(s) Oral two times a day    MEDICATIONS  (PRN):  acetaminophen     Tablet .. 650 milliGRAM(s) Oral every 6 hours PRN Temp greater or equal to 38C (100.4F), Mild Pain (1 - 3)  heparin   Injectable 3200 Unit(s) IV Push every 6 hours PRN For aPTT less than 40  melatonin 3 milliGRAM(s) Oral at bedtime PRN Insomnia  ondansetron Injectable 4 milliGRAM(s) IV Push every 8 hours PRN Nausea and/or Vomiting      Allergies    Allergy Status Unknown  No Known Allergies    Intolerances        REVIEW OF SYSTEMS:  CONSTITUTIONAL: No fever or chills  HEENT:  No headache, no sore throat  RESPIRATORY: No cough, wheezing, or shortness of breath  CARDIOVASCULAR: No chest pain, palpitations  GASTROINTESTINAL: No abd pain, nausea, vomiting, or diarrhea  GENITOURINARY: No dysuria, frequency, or hematuria  NEUROLOGICAL: no focal weakness or dizziness  MUSCULOSKELETAL: no myalgias     Vital Signs Last 24 Hrs  T(C): 36.5 (2023 11:31), Max: 36.9 (2023 20:42)  T(F): 97.7 (2023 11:31), Max: 98.4 (2023 20:42)  HR: 62 (2023 11:31) (61 - 98)  BP: 169/88 (2023 11:31) (127/60 - 171/79)  BP(mean): --  RR: 18 (2023 11:31) (18 - 19)  SpO2: 94% (2023 11:31) (94% - 97%)    Parameters below as of 2023 11:31  Patient On (Oxygen Delivery Method): room air        GENERAL: appears stated age, lying in bed, NAD   HEAD:  Atraumatic, Normocephalic  EYES: EOMI, conjunctiva and sclera clear  ENT: Moist mucous membranes  NECK: Supple, No JVD  CHEST/LUNG: CTA bilaterally; No rales, rhonchi, wheezing, or rubs. Unlabored respirations  HEART: Regular rate and rhythm; No murmurs, rubs, or gallops  ABDOMEN: Bowel sounds present; Soft, Nontender, Nondistended  EXTREMITIES:  2+ Peripheral Pulses,. No clubbing, cyanosis, or edema  NERVOUS SYSTEM:  Alert & Oriented X2, follows commands, speech clear. No deficits   SKIN: warm, dry    LABS:                        10.0   8.07  )-----------( 262      ( 2023 07:33 )             31.8     CBC Full  -  ( 2023 07:33 )  WBC Count : 8.07 K/uL  Hemoglobin : 10.0 g/dL  Hematocrit : 31.8 %  Platelet Count - Automated : 262 K/uL  Mean Cell Volume : 95.2 fl  Mean Cell Hemoglobin : 29.9 pg  Mean Cell Hemoglobin Concentration : 31.4 gm/dL  Auto Neutrophil # : x  Auto Lymphocyte # : x  Auto Monocyte # : x  Auto Eosinophil # : x  Auto Basophil # : x  Auto Neutrophil % : x  Auto Lymphocyte % : x  Auto Monocyte % : x  Auto Eosinophil % : x  Auto Basophil % : x    2023 07:33    140    |  105    |  15     ----------------------------<  84     3.7     |  30     |  0.75     Ca    8.5        2023 07:33  Phos  2.3       2023 07:33  Mg     2.0       2023 07:33    TPro  6.8    /  Alb  2.8    /  TBili  0.4    /  DBili  x      /  AST  13     /  ALT  12     /  AlkPhos  68     2023 07:33    PTT - ( 2023 08:15 )  PTT:62.4 sec  Urinalysis Basic - ( 2023 17:15 )    Color: Yellow / Appearance: Slightly Turbid / S.015 / pH: x  Gluc: x / Ketone: Negative  / Bili: Negative / Urobili: Negative   Blood: x / Protein: 30 mg/dL / Nitrite: Positive   Leuk Esterase: Moderate / RBC: 3-5 /HPF / WBC >50   Sq Epi: x / Non Sq Epi: Few / Bacteria: TNTC      CAPILLARY BLOOD GLUCOSE            Culture - Blood (collected 02-10-23 @ 12:40)  Source: .Blood Blood-Peripheral  Preliminary Report (23 @ 18:01):    No growth to date.    Culture - Blood (collected 02-10-23 @ 12:40)  Source: .Blood Blood-Peripheral  Preliminary Report (23 @ 18:01):    No growth to date.        RADIOLOGY & ADDITIONAL TESTS:    Personally reviewed.     Consultant(s) Notes Reviewed:  [x] YES  [ ] NO

## 2023-02-12 NOTE — PROGRESS NOTE ADULT - PROBLEM SELECTOR PLAN 2
Pt found to have small pleural effusion on CXR  - CXR - Left costophrenic angle is blunted suggesting small pleural effusion.  - Asymptomatic, no shortness of breath  - Continue to monitor respiratory status +UA  started on rocephin   f/u cultures

## 2023-02-12 NOTE — PROGRESS NOTE ADULT - PROBLEM SELECTOR PLAN 3
Likely anemia of chronic disease  - Hgb 9.9 on admission, was in the ranges of ~10s during last hospitalization in 01/2023  - Currently hemodynamically stable  - F/u iron panel: iron, ferritin, TIBC, transferrin  - Folate WNL and vitamin B12 elevated  - Trend CBC Pt found to have small pleural effusion on CXR  - CXR - Left costophrenic angle is blunted suggesting small pleural effusion.  - Asymptomatic, no shortness of breath  - Continue to monitor respiratory status

## 2023-02-12 NOTE — PROGRESS NOTE ADULT - SUBJECTIVE AND OBJECTIVE BOX
[facility] -- Jeremi Perkins Pannella, Patel, Savella [facility]  Office # [phone]      Follow Up:    htn   Subjective/Observations:   Seen at bedside, alert confused in no acute distress  denies chest pain dizziness palpitations     REVIEW OF SYSTEMS: All other review of systems is negative unless indicated above    PAST MEDICAL & SURGICAL HISTORY:  Hypertension      Dementia      No significant past surgical history          MEDICATIONS  (STANDING):  aMIOdarone    Tablet   Oral   aMIOdarone    Tablet 400 milliGRAM(s) Oral every 8 hours  cefTRIAXone   IVPB 1000 milliGRAM(s) IV Intermittent every 24 hours  heparin  Infusion.  Unit(s)/Hr (6.5 mL/Hr) IV Continuous <Continuous>  metoprolol tartrate 12.5 milliGRAM(s) Oral two times a day    MEDICATIONS  (PRN):  acetaminophen     Tablet .. 650 milliGRAM(s) Oral every 6 hours PRN Temp greater or equal to 38C (100.4F), Mild Pain (1 - 3)  heparin   Injectable 3200 Unit(s) IV Push every 6 hours PRN For aPTT less than 40  melatonin 3 milliGRAM(s) Oral at bedtime PRN Insomnia  ondansetron Injectable 4 milliGRAM(s) IV Push every 8 hours PRN Nausea and/or Vomiting      Allergies    Allergy Status Unknown  No Known Allergies    Intolerances        Vital Signs Last 24 Hrs  T(C): 36.8 (2023 05:02), Max: 36.9 (2023 20:42)  T(F): 98.3 (2023 05:02), Max: 98.4 (2023 20:42)  HR: 98 (2023 05:02) (61 - 98)  BP: 165/93 (2023 05:02) (127/60 - 171/79)  BP(mean): --  RR: 19 (2023 05:02) (18 - 19)  SpO2: 94% (2023 05:02) (94% - 97%)    Parameters below as of 2023 05:02  Patient On (Oxygen Delivery Method): room air        I&O's Summary    2023 07:01  -  2023 07:00  --------------------------------------------------------  IN: 7 mL / OUT: 0 mL / NET: 7 mL    2023 07:01  -  2023 10:03  --------------------------------------------------------  IN: 71 mL / OUT: 0 mL / NET: 71 mL          PHYSICAL EXAM:  TELE: SR  Constitutional: NAD, awake and alert, well-developed  HEENT: Moist Mucous Membranes, Anicteric  Pulmonary: Non-labored, breath sounds are clear bilaterally, No wheezing, crackles or rhonchi  Cardiovascular: Regular, S1 and S2 nl, No murmurs, rubs, gallops or clicks  Gastrointestinal: Bowel Sounds present, soft, nontender.   Lymph: No lymphadenopathy. No peripheral edema.  Skin: No visible rashes or ulcers.  Psych:  Mood & affect appropriate, confused    LABS: All Labs Reviewed:                        10.0   8.07  )-----------( 262      ( 2023 07:33 )             31.8                         8.9    9.33  )-----------( 224      ( 2023 05:10 )             27.6                         9.9    9.02  )-----------( 283      ( 10 Feb 2023 11:00 )             31.6     2023 07:33    140    |  105    |  15     ----------------------------<  84     3.7     |  30     |  0.75   2023 05:10    141    |  108    |  20     ----------------------------<  91     3.8     |  26     |  0.87   10 Feb 2023 11:00    138    |  105    |  19     ----------------------------<  106    3.8     |  26     |  0.90     Ca    8.5        2023 07:33  Ca    8.5        2023 05:10  Ca    9.1        10 Feb 2023 11:00  Phos  2.3       2023 07:33  Phos  2.8       2023 05:10  Phos  3.0       10 Feb 2023 22:28  Mg     2.0       2023 07:33  Mg     2.0       2023 05:10  Mg     1.8       10 Feb 2023 22:28    TPro  6.8    /  Alb  2.8    /  TBili  0.4    /  DBili  x      /  AST  13     /  ALT  12     /  AlkPhos  68     2023 07:33  TPro  6.1    /  Alb  2.8    /  TBili  0.3    /  DBili  x      /  AST  17     /  ALT  10     /  AlkPhos  61     2023 05:10  TPro  7.1    /  Alb  3.0    /  TBili  0.5    /  DBili  x      /  AST  12     /  ALT  11     /  AlkPhos  80     10 Feb 2023 11:00    PT/INR - ( 10 Feb 2023 11:00 )   PT: 12.9 sec;   INR: 1.10 ratio         PTT - ( 2023 08:15 )  PTT:62.4 sec  CARDIAC MARKERS ( 2023 05:10 )  x     / x     / 51 U/L / x     / 4.3 ng/mL  CARDIAC MARKERS ( 10 Feb 2023 18:19 )  x     / x     / 46 U/L / x     / 4.4 ng/mL         EC Lead ECG:   Ventricular Rate 71 BPM    Atrial Rate 71 BPM    P-R Interval 158 ms    QRS Duration 84 ms    Q-T Interval 368 ms    QTC Calculation(Bazett) 399 ms    P Axis 63 degrees    R Axis -28 degrees    T Axis 27 degrees    Diagnosis Line Normal sinus rhythm  Nonspecific ST and T wave abnormality  Abnormal ECG  When compared with ECG of 2023 08:29, (Unconfirmed)  No significant change was found  Confirmed by ROMELIA ALONSO ([id number]) on 2023 2:09:48 PM (23 @ 08:30)      ACC: [id number] EXAM:  ECHO TTE WO CON COMP W DOPP                          PROCEDURE DATE:  2023          INTERPRETATION:  INDICATION: Abnormal EKG  Sonographer KL    Blood Pressure 136/81    Height 158 cm     Weight 57 kg       BSA 1.6 sq m    Dimensions:  LA 2.3       Normal Values: 2.0 - 4.0 cm  Ao 3.3        Normal Values: 2.0 - 3.8 cm  SEPTUM 1.2       Normal Values: 0.6 - 1.2 cm  PWT 1.1       Normal Values: 0.6 - 1.1 cm  LVIDd 4.0         Normal Values: 3.0 - 5.6 cm  LVIDs 2.4  Normal Values: 1.8 - 4.0 cm      OBSERVATIONS:  Mitral Valve: normal, trace physiologic MR.  Aortic Valve/Aorta: Sclerotic trileaflet aortic valve. Trace to mild AI  Tricuspid Valve: normal with trace TR.  Pulmonic Valve: Not well-visualized  Left Atrium: normal  Right Atrium: Not well-visualized  Left Ventricle: normal LV size and systolic function, estimated LVEF of   65%.  Right Ventricle: Grossly normal size and systolic function.  Pericardium: Trace pericardial effusion.      IMPRESSION:  Normal left ventricular internal dimensions and systolic function,   estimated LVEF of 65%.  Grossly normal RV size and systolic function.  Sclerotic trileaflet aortic valve, trace to mild AI.  Trace physiologic MR and TR.  Trace pericardial effusion.

## 2023-02-12 NOTE — PROGRESS NOTE ADULT - PROBLEM SELECTOR PLAN 1
Pt presents with new onset Afib RVR up to 140s and hypotension  - In the ED: 1L NS bolus, 1x Lopressor 2.5mg IVP, Cardizem gtt 5mL/hr, Lovenox 50mg, Dig 500mcg IVP  - EKG on admission: Afib with RVR 132bpm, PVCs  - initially held home Metoprolol 12.5 mg BID due to low blood pressures; pt missed dose at rehab due to hypotension. now restarted  - Now on amiodarone    - Patient sons, Tomer and Joshua, who are the medical decision makers, initially refused blood thinner however given pt also with elevated troponin they agreed to starting hep gtt. continue for now. will need further discussion with family regarding A/c going forward.   - troponins trended up overnight- now peaked. 2/2 demand ischemia in setting of rapid afib.   - TSH WNL  - Replete electrolytes as needed  - TTE 1/2023 showed normal LV & RV size and function EF 65%, mild AI, trace MR and TR, trace pericardial effusion.   - [id number] score: 4  - Monitor lytes and replete as needed  - Telemonitoring  - Cardiology Dr. Philip consulted, recs appreciated Pt presents with new onset Afib RVR up to 140s and hypotension  - In the ED: 1L NS bolus, 1x Lopressor 2.5mg IVP, Cardizem gtt 5mL/hr, Lovenox 50mg, Dig 500mcg IVP  - EKG on admission: Afib with RVR 132bpm, PVCs  - initially held home Metoprolol 12.5 mg BID due to low blood pressures; pt missed dose at rehab due to hypotension. now restarted  - Now on amiodarone    - Patient sons, Tomer and Joshua, who are the medical decision makers, initially refused blood thinner however given pt also with elevated troponin they agreed to starting hep gtt. continue for now. will need further discussion with family regarding A/c going forward. 2/12- son will speak to friend who is cardiologist and decide on A/c.  - troponins trended up overnight- now peaked. 2/2 demand ischemia in setting of rapid afib.   - TSH WNL  - Replete electrolytes as needed  - TTE 1/2023 showed normal LV & RV size and function EF 65%, mild AI, trace MR and TR, trace pericardial effusion.   - [id number] score: 4  - Monitor lytes and replete as needed  - Telemonitoring  - Cardiology Dr. Philip consulted, recs appreciated

## 2023-02-12 NOTE — PROGRESS NOTE ADULT - ASSESSMENT
87 F PMH HTN presents  from [facility] assisted living with low BP and tachycardia, found to be in A fib RVR. Patient was admitted recently here  after an unwitnessed fall, found to have E coli bacteremia and UTI on Jan 14, 23 and discharged 1/20/23. Cardiology was consulted A fib RVR and hypotension     New Onset A fib RVR, HTN  - Tele with A fib -140s converted to SR continue tele monitoring    - initially on Cardizem gtt, now on amio load, continue   - SR 90's , bp 165/90, increase bb to 25mg PO BID to optimize bp and heart rate     - EKG showed A fib RVR @ 132.  - No evidence of any active ischemia   - trops elev, peaked and now downtrending, likely in setting of AFib with RVR   - CHADS VASc : 4   - continue hep gtt, , continue ac, family was previously reluctant will need to discuss futher     - ECHO 01/2023 showed normal LV & RV size and function EF 65%, mild AI, trace MR and TR, trace pericardial effusion.   - No evidence of any meaningful volume overload  - Monitor and replete Lytes. Keep K > 4 and Mg > 2  Tana Cage FNP-C  Cardiology NP  [id number] [phone]

## 2023-02-12 NOTE — PROGRESS NOTE ADULT - PROBLEM SELECTOR PLAN 5
Chronic  - Worsening memory status as per son by bedside; at baseline currently  - Able to answer some questions appropriately - however unreliable historian  - Not on any meds Chronic  - restarted home metoprolol.   - monitor for hypotension   - Monitor routine hemodynamics

## 2023-02-12 NOTE — PROGRESS NOTE ADULT - PROBLEM SELECTOR PLAN 6
- on hep gtt, ongoing discussion with family regarding AC Chronic  - Worsening memory status as per son by bedside; at baseline currently  - Able to answer some questions appropriately - however unreliable historian  - Not on any meds

## 2023-02-12 NOTE — PROGRESS NOTE ADULT - PROBLEM SELECTOR PLAN 4
Chronic  - restarted home metoprolol.   - monitor for hypotension   - Monitor routine hemodynamics Likely anemia of chronic disease  - Hgb 9.9 on admission, was in the ranges of ~10s during last hospitalization in 01/2023  - Currently hemodynamically stable  - F/u iron panel: iron, ferritin, TIBC, transferrin  - Folate WNL and vitamin B12 elevated  - Trend CBC

## 2023-02-13 LAB
ANION GAP SERPL CALC-SCNC: 7 MMOL/L — SIGNIFICANT CHANGE UP (ref 5–17)
APTT BLD: 66.5 SEC — HIGH (ref 27.5–35.5)
BUN SERPL-MCNC: 10 MG/DL — SIGNIFICANT CHANGE UP (ref 7–23)
CALCIUM SERPL-MCNC: 8.6 MG/DL — SIGNIFICANT CHANGE UP (ref 8.5–10.1)
CHLORIDE SERPL-SCNC: 106 MMOL/L — SIGNIFICANT CHANGE UP (ref 96–108)
CO2 SERPL-SCNC: 27 MMOL/L — SIGNIFICANT CHANGE UP (ref 22–31)
CREAT SERPL-MCNC: 0.8 MG/DL — SIGNIFICANT CHANGE UP (ref 0.5–1.3)
EGFR: 72 ML/MIN/1.73M2 — SIGNIFICANT CHANGE UP
GLUCOSE SERPL-MCNC: 96 MG/DL — SIGNIFICANT CHANGE UP (ref 70–99)
HCT VFR BLD CALC: 33 % — LOW (ref 34.5–45)
HGB BLD-MCNC: 10.4 G/DL — LOW (ref 11.5–15.5)
MAGNESIUM SERPL-MCNC: 2 MG/DL — SIGNIFICANT CHANGE UP (ref 1.6–2.6)
MCHC RBC-ENTMCNC: 29.6 PG — SIGNIFICANT CHANGE UP (ref 27–34)
MCHC RBC-ENTMCNC: 31.5 GM/DL — LOW (ref 32–36)
MCV RBC AUTO: 94 FL — SIGNIFICANT CHANGE UP (ref 80–100)
NRBC # BLD: 0 /100 WBCS — SIGNIFICANT CHANGE UP (ref 0–0)
PHOSPHATE SERPL-MCNC: 3.1 MG/DL — SIGNIFICANT CHANGE UP (ref 2.5–4.5)
PLATELET # BLD AUTO: 277 K/UL — SIGNIFICANT CHANGE UP (ref 150–400)
POTASSIUM SERPL-MCNC: 3.8 MMOL/L — SIGNIFICANT CHANGE UP (ref 3.5–5.3)
POTASSIUM SERPL-SCNC: 3.8 MMOL/L — SIGNIFICANT CHANGE UP (ref 3.5–5.3)
RBC # BLD: 3.51 M/UL — LOW (ref 3.8–5.2)
RBC # FLD: 15.1 % — HIGH (ref 10.3–14.5)
SODIUM SERPL-SCNC: 140 MMOL/L — SIGNIFICANT CHANGE UP (ref 135–145)
WBC # BLD: 8.87 K/UL — SIGNIFICANT CHANGE UP (ref 3.8–10.5)
WBC # FLD AUTO: 8.87 K/UL — SIGNIFICANT CHANGE UP (ref 3.8–10.5)

## 2023-02-13 PROCEDURE — 99233 SBSQ HOSP IP/OBS HIGH 50: CPT | Mod: GC

## 2023-02-13 PROCEDURE — 99232 SBSQ HOSP IP/OBS MODERATE 35: CPT

## 2023-02-13 RX ORDER — HEPARIN SODIUM 5000 [USP'U]/ML
4000 INJECTION INTRAVENOUS; SUBCUTANEOUS EVERY 6 HOURS
Refills: 0 | Status: DISCONTINUED | OUTPATIENT
Start: 2023-02-13 | End: 2023-02-16

## 2023-02-13 RX ORDER — HEPARIN SODIUM 5000 [USP'U]/ML
INJECTION INTRAVENOUS; SUBCUTANEOUS
Qty: 25000 | Refills: 0 | Status: DISCONTINUED | OUTPATIENT
Start: 2023-02-13 | End: 2023-02-15

## 2023-02-13 RX ORDER — HEPARIN SODIUM 5000 [USP'U]/ML
2000 INJECTION INTRAVENOUS; SUBCUTANEOUS EVERY 6 HOURS
Refills: 0 | Status: DISCONTINUED | OUTPATIENT
Start: 2023-02-13 | End: 2023-02-16

## 2023-02-13 RX ORDER — ACETAMINOPHEN 500 MG
1000 TABLET ORAL ONCE
Refills: 0 | Status: DISCONTINUED | OUTPATIENT
Start: 2023-02-13 | End: 2023-02-13

## 2023-02-13 RX ORDER — AMLODIPINE BESYLATE 2.5 MG/1
5 TABLET ORAL DAILY
Refills: 0 | Status: DISCONTINUED | OUTPATIENT
Start: 2023-02-13 | End: 2023-02-13

## 2023-02-13 RX ORDER — METOPROLOL TARTRATE 50 MG
25 TABLET ORAL
Refills: 0 | Status: DISCONTINUED | OUTPATIENT
Start: 2023-02-13 | End: 2023-02-15

## 2023-02-13 RX ADMIN — HEPARIN SODIUM 1000 UNIT(S)/HR: 5000 INJECTION INTRAVENOUS; SUBCUTANEOUS at 18:37

## 2023-02-13 RX ADMIN — AMIODARONE HYDROCHLORIDE 400 MILLIGRAM(S): 400 TABLET ORAL at 21:33

## 2023-02-13 RX ADMIN — Medication 12.5 MILLIGRAM(S): at 05:29

## 2023-02-13 RX ADMIN — HEPARIN SODIUM 700 UNIT(S)/HR: 5000 INJECTION INTRAVENOUS; SUBCUTANEOUS at 08:56

## 2023-02-13 RX ADMIN — HEPARIN SODIUM 1000 UNIT(S)/HR: 5000 INJECTION INTRAVENOUS; SUBCUTANEOUS at 19:03

## 2023-02-13 RX ADMIN — AMIODARONE HYDROCHLORIDE 400 MILLIGRAM(S): 400 TABLET ORAL at 05:29

## 2023-02-13 RX ADMIN — Medication 25 MILLIGRAM(S): at 18:43

## 2023-02-13 RX ADMIN — AMIODARONE HYDROCHLORIDE 400 MILLIGRAM(S): 400 TABLET ORAL at 13:35

## 2023-02-13 RX ADMIN — HEPARIN SODIUM 700 UNIT(S)/HR: 5000 INJECTION INTRAVENOUS; SUBCUTANEOUS at 07:14

## 2023-02-13 RX ADMIN — CEFTRIAXONE 100 MILLIGRAM(S): 500 INJECTION, POWDER, FOR SOLUTION INTRAMUSCULAR; INTRAVENOUS at 13:12

## 2023-02-13 NOTE — PHARMACOTHERAPY INTERVENTION NOTE - COMMENTS
Patient is an 87-year old female weighing 51.6 kg ordered for heparin infusion using the ACS nomogram. Patient previously had elevated troponin 2/2 to demand ischemia, which has resolved. Discussed with Dr. Williamson switching heparin drip to the full anticoagulation nomogram, since patient is not on heparin for ACS. Dr. Williamson agreed and order was entered as per discussion.    Patient is an 87-year old female weighing 51.6 kg ordered for heparin infusion using the ACS nomogram. Discussed with Dr. Williamson that patient previously had elevated troponin 2/2 to demand ischemia but is now on heparin for anticoagulation for Afib. Discussed with Dr. Williamson switching heparin drip to the full anticoagulation nomogram to match indication. Dr. Williamson agreed and order was entered as per discussion.

## 2023-02-13 NOTE — PHYSICAL THERAPY INITIAL EVALUATION ADULT - GENERAL OBSERVATIONS, REHAB EVAL
Patient was received and left supine with head of bed elevated, to receive hygiene care with Heparin Drip, tele box and lines in place, call bell in reach, bed alarm activated & PCA present.

## 2023-02-13 NOTE — PROGRESS NOTE ADULT - SUBJECTIVE AND OBJECTIVE BOX
Patient is a 87y old  Female who presents with a chief complaint of Afib with RVR (2023 07:50)    INTERVAL HPI/OVERNIGHT EVENTS: Patient seen and examined at bedside. No overnight events occurred. Patient has no complaints at this time. Denies fevers, chills, headache, lightheadedness, chest pain, dyspnea, abdominal pain, n/v/d/c.    MEDICATIONS  (STANDING):  aMIOdarone    Tablet   Oral   aMIOdarone    Tablet 400 milliGRAM(s) Oral every 8 hours  cefTRIAXone   IVPB 1000 milliGRAM(s) IV Intermittent every 24 hours  heparin  Infusion.  Unit(s)/Hr (6.5 mL/Hr) IV Continuous <Continuous>  metoprolol tartrate 12.5 milliGRAM(s) Oral two times a day    MEDICATIONS  (PRN):  acetaminophen     Tablet .. 650 milliGRAM(s) Oral every 6 hours PRN Temp greater or equal to 38C (100.4F), Mild Pain (1 - 3)  heparin   Injectable 3200 Unit(s) IV Push every 6 hours PRN For aPTT less than 40  melatonin 3 milliGRAM(s) Oral at bedtime PRN Insomnia  ondansetron Injectable 4 milliGRAM(s) IV Push every 8 hours PRN Nausea and/or Vomiting      Allergies    Allergy Status Unknown  No Known Allergies    Intolerances        REVIEW OF SYSTEMS:  CONSTITUTIONAL: No fever or chills  HEENT:  No headache, no sore throat  RESPIRATORY: No cough, wheezing, or shortness of breath  CARDIOVASCULAR: No chest pain, palpitations  GASTROINTESTINAL: No abd pain, nausea, vomiting, or diarrhea  GENITOURINARY: No dysuria, frequency, or hematuria  NEUROLOGICAL: no focal weakness or dizziness  MUSCULOSKELETAL: no myalgias     Vital Signs Last 24 Hrs  T(C): 36.9 (2023 12:23), Max: 36.9 (2023 12:23)  T(F): 98.5 (2023 12:23), Max: 98.5 (2023 12:23)  HR: 61 (2023 12:23) (61 - 67)  BP: 162/87 (2023 12:23) (162/87 - 188/79)  BP(mean): --  RR: 18 (2023 12:23) (18 - 18)  SpO2: 95% (2023 12:23) (93% - 95%)    Parameters below as of 2023 12:23  Patient On (Oxygen Delivery Method): room air        PHYSICAL EXAM:  GENERAL: NAD  HEENT:  anicteric, moist mucous membranes  CHEST/LUNG:  CTA b/l, no rales, wheezes, or rhonchi  HEART:  RRR, S1, S2  ABDOMEN:  BS+, soft, nontender, nondistended  EXTREMITIES: no edema, cyanosis, or calf tenderness  NERVOUS SYSTEM: answers questions and follows commands appropriately    LABS:                        10.4   8.87  )-----------( 277      ( 2023 07:20 )             33.0     CBC Full  -  ( 2023 07:20 )  WBC Count : 8.87 K/uL  Hemoglobin : 10.4 g/dL  Hematocrit : 33.0 %  Platelet Count - Automated : 277 K/uL  Mean Cell Volume : 94.0 fl  Mean Cell Hemoglobin : 29.6 pg  Mean Cell Hemoglobin Concentration : 31.5 gm/dL  Auto Neutrophil # : x  Auto Lymphocyte # : x  Auto Monocyte # : x  Auto Eosinophil # : x  Auto Basophil # : x  Auto Neutrophil % : x  Auto Lymphocyte % : x  Auto Monocyte % : x  Auto Eosinophil % : x  Auto Basophil % : x    2023 07:20    140    |  106    |  10     ----------------------------<  96     3.8     |  27     |  0.80     Ca    8.6        2023 07:20  Phos  3.1       2023 07:20  Mg     2.0       2023 07:20      PTT - ( 2023 07:20 )  PTT:66.5 sec  Urinalysis Basic - ( 2023 17:15 )    Color: Yellow / Appearance: Slightly Turbid / S.015 / pH: x  Gluc: x / Ketone: Negative  / Bili: Negative / Urobili: Negative   Blood: x / Protein: 30 mg/dL / Nitrite: Positive   Leuk Esterase: Moderate / RBC: 3-5 /HPF / WBC >50   Sq Epi: x / Non Sq Epi: Few / Bacteria: TNTC      CAPILLARY BLOOD GLUCOSE            Culture - Blood (collected 02-10-23 @ 12:40)  Source: .Blood Blood-Peripheral  Preliminary Report (23 @ 18:01):    No growth to date.    Culture - Blood (collected 02-10-23 @ 12:40)  Source: .Blood Blood-Peripheral  Preliminary Report (23 @ 18:01):    No growth to date.        RADIOLOGY & ADDITIONAL TESTS:    Personally reviewed.     Consultant(s) Notes Reviewed:  [x] YES  [ ] NO     Patient is a 87y old  Female who presents with a chief complaint of Afib with RVR (2023 07:50)    INTERVAL HPI/OVERNIGHT EVENTS: Patient seen and examined at bedside. No overnight events occurred. Patient has no complaints at this time. Denies fevers, chills, headache, lightheadedness, chest pain, dyspnea, abdominal pain, n/v/d/c.    MEDICATIONS  (STANDING):  aMIOdarone    Tablet   Oral   aMIOdarone    Tablet 400 milliGRAM(s) Oral every 8 hours  cefTRIAXone   IVPB 1000 milliGRAM(s) IV Intermittent every 24 hours  heparin  Infusion.  Unit(s)/Hr (6.5 mL/Hr) IV Continuous <Continuous>  metoprolol tartrate 12.5 milliGRAM(s) Oral two times a day    MEDICATIONS  (PRN):  acetaminophen     Tablet .. 650 milliGRAM(s) Oral every 6 hours PRN Temp greater or equal to 38C (100.4F), Mild Pain (1 - 3)  heparin   Injectable 3200 Unit(s) IV Push every 6 hours PRN For aPTT less than 40  melatonin 3 milliGRAM(s) Oral at bedtime PRN Insomnia  ondansetron Injectable 4 milliGRAM(s) IV Push every 8 hours PRN Nausea and/or Vomiting    Allergies  Allergy Status Unknown  No Known Allergies    Intolerances    REVIEW OF SYSTEMS:  CONSTITUTIONAL: No fever or chills  HEENT:  No headache, no sore throat  RESPIRATORY: No cough, wheezing, or shortness of breath  CARDIOVASCULAR: No chest pain, palpitations  GASTROINTESTINAL: No abd pain, nausea, vomiting, or diarrhea  GENITOURINARY: No dysuria, frequency, or hematuria  NEUROLOGICAL: no focal weakness or dizziness  MUSCULOSKELETAL: no myalgias     Vital Signs Last 24 Hrs  T(C): 36.9 (2023 12:23), Max: 36.9 (2023 12:23)  T(F): 98.5 (2023 12:23), Max: 98.5 (2023 12:23)  HR: 61 (2023 12:23) (61 - 67)  BP: 162/87 (2023 12:23) (162/87 - 188/79)  BP(mean): --  RR: 18 (2023 12:23) (18 - 18)  SpO2: 95% (2023 12:23) (93% - 95%)    Parameters below as of 2023 12:23  Patient On (Oxygen Delivery Method): room air    PHYSICAL EXAM:  GENERAL: NAD  HEENT:  anicteric, moist mucous membranes  CHEST/LUNG:  CTA b/l, no rales, wheezes, or rhonchi  HEART:  RRR, S1, S2  ABDOMEN:  BS+, soft, nontender, nondistended  EXTREMITIES: no edema, cyanosis, or calf tenderness  NERVOUS SYSTEM: answers questions and follows commands appropriately    LABS:                        10.4   8.87  )-----------( 277      ( 2023 07:20 )             33.0     CBC Full  -  ( 2023 07:20 )  WBC Count : 8.87 K/uL  Hemoglobin : 10.4 g/dL  Hematocrit : 33.0 %  Platelet Count - Automated : 277 K/uL  Mean Cell Volume : 94.0 fl  Mean Cell Hemoglobin : 29.6 pg  Mean Cell Hemoglobin Concentration : 31.5 gm/dL  Auto Neutrophil # : x  Auto Lymphocyte # : x  Auto Monocyte # : x  Auto Eosinophil # : x  Auto Basophil # : x  Auto Neutrophil % : x  Auto Lymphocyte % : x  Auto Monocyte % : x  Auto Eosinophil % : x  Auto Basophil % : x    2023 07:20    140    |  106    |  10     ----------------------------<  96     3.8     |  27     |  0.80     Ca    8.6        2023 07:20  Phos  3.1       2023 07:20  Mg     2.0       2023 07:20      PTT - ( 2023 07:20 )  PTT:66.5 sec  Urinalysis Basic - ( 2023 17:15 )    Color: Yellow / Appearance: Slightly Turbid / S.015 / pH: x  Gluc: x / Ketone: Negative  / Bili: Negative / Urobili: Negative   Blood: x / Protein: 30 mg/dL / Nitrite: Positive   Leuk Esterase: Moderate / RBC: 3-5 /HPF / WBC >50   Sq Epi: x / Non Sq Epi: Few / Bacteria: TNTC      CAPILLARY BLOOD GLUCOSE    Culture - Blood (collected 02-10-23 @ 12:40)  Source: .Blood Blood-Peripheral  Preliminary Report (23 @ 18:01):    No growth to date.    Culture - Blood (collected 02-10-23 @ 12:40)  Source: .Blood Blood-Peripheral  Preliminary Report (23 @ 18:01):    No growth to date.    RADIOLOGY & ADDITIONAL TESTS:    Personally reviewed.     Consultant(s) Notes Reviewed:  [x] YES  [ ] NO     Patient is a 87y old  Female who presents with a chief complaint of Afib with RVR (2023 07:50)    INTERVAL HPI/OVERNIGHT EVENTS: Patient seen and examined at bedside. No overnight events occurred. Patient has no complaints at this time. Denies fevers, chills, headache, lightheadedness, chest pain, dyspnea, abdominal pain, n/v/d/c.    MEDICATIONS  (STANDING):  aMIOdarone    Tablet   Oral   aMIOdarone    Tablet 400 milliGRAM(s) Oral every 8 hours  cefTRIAXone   IVPB 1000 milliGRAM(s) IV Intermittent every 24 hours  heparin  Infusion.  Unit(s)/Hr (6.5 mL/Hr) IV Continuous <Continuous>  metoprolol tartrate 12.5 milliGRAM(s) Oral two times a day    MEDICATIONS  (PRN):  acetaminophen     Tablet .. 650 milliGRAM(s) Oral every 6 hours PRN Temp greater or equal to 38C (100.4F), Mild Pain (1 - 3)  heparin   Injectable 3200 Unit(s) IV Push every 6 hours PRN For aPTT less than 40  melatonin 3 milliGRAM(s) Oral at bedtime PRN Insomnia  ondansetron Injectable 4 milliGRAM(s) IV Push every 8 hours PRN Nausea and/or Vomiting    Allergies  Allergy Status Unknown  No Known Allergies    Intolerances    REVIEW OF SYSTEMS:  CONSTITUTIONAL: No fever or chills  HEENT:  No headache, no sore throat  RESPIRATORY: No cough, wheezing, or shortness of breath  CARDIOVASCULAR: No chest pain, palpitations  GASTROINTESTINAL: No abd pain, nausea, vomiting, or diarrhea  GENITOURINARY: No dysuria, frequency, or hematuria  NEUROLOGICAL: no focal weakness or dizziness  MUSCULOSKELETAL: no myalgias     Vital Signs Last 24 Hrs  T(C): 36.9 (2023 12:23), Max: 36.9 (2023 12:23)  T(F): 98.5 (2023 12:23), Max: 98.5 (2023 12:23)  HR: 61 (2023 12:23) (61 - 67)  BP: 162/87 (2023 12:23) (162/87 - 188/79)  BP(mean): --  RR: 18 (2023 12:23) (18 - 18)  SpO2: 95% (2023 12:23) (93% - 95%)    Parameters below as of 2023 12:23  Patient On (Oxygen Delivery Method): room air    PHYSICAL EXAM:  GENERAL: NAD  HEENT:  anicteric, moist mucous membranes  CHEST/LUNG:  CTA b/l, no rales, wheezes, or rhonchi  HEART:  RRR, S1, S2  ABDOMEN:  BS+, soft, nontender, nondistended  EXTREMITIES: no edema, cyanosis, or calf tenderness  NERVOUS SYSTEM: answers questions and follows commands appropriately, occasionally confused    LABS:                        10.4   8.87  )-----------( 277      ( 2023 07:20 )             33.0     CBC Full  -  ( 2023 07:20 )  WBC Count : 8.87 K/uL  Hemoglobin : 10.4 g/dL  Hematocrit : 33.0 %  Platelet Count - Automated : 277 K/uL  Mean Cell Volume : 94.0 fl  Mean Cell Hemoglobin : 29.6 pg  Mean Cell Hemoglobin Concentration : 31.5 gm/dL  Auto Neutrophil # : x  Auto Lymphocyte # : x  Auto Monocyte # : x  Auto Eosinophil # : x  Auto Basophil # : x  Auto Neutrophil % : x  Auto Lymphocyte % : x  Auto Monocyte % : x  Auto Eosinophil % : x  Auto Basophil % : x    2023 07:20    140    |  106    |  10     ----------------------------<  96     3.8     |  27     |  0.80     Ca    8.6        2023 07:20  Phos  3.1       2023 07:20  Mg     2.0       2023 07:20      PTT - ( 2023 07:20 )  PTT:66.5 sec  Urinalysis Basic - ( 2023 17:15 )    Color: Yellow / Appearance: Slightly Turbid / S.015 / pH: x  Gluc: x / Ketone: Negative  / Bili: Negative / Urobili: Negative   Blood: x / Protein: 30 mg/dL / Nitrite: Positive   Leuk Esterase: Moderate / RBC: 3-5 /HPF / WBC >50   Sq Epi: x / Non Sq Epi: Few / Bacteria: TNTC      CAPILLARY BLOOD GLUCOSE    Culture - Blood (collected 02-10-23 @ 12:40)  Source: .Blood Blood-Peripheral  Preliminary Report (23 @ 18:01):    No growth to date.    Culture - Blood (collected 02-10-23 @ 12:40)  Source: .Blood Blood-Peripheral  Preliminary Report (23 @ 18:01):    No growth to date.    RADIOLOGY & ADDITIONAL TESTS:    Personally reviewed.     Consultant(s) Notes Reviewed:  [x] YES  [ ] NO

## 2023-02-13 NOTE — CARE COORDINATION ASSESSMENT. - NSPASTMEDSURGHISTORY_GEN_ALL_CORE_FT
PAST MEDICAL & SURGICAL HISTORY:  Hypertension      Dementia      No significant past surgical history

## 2023-02-13 NOTE — PROVIDER CONTACT NOTE (OTHER) - ASSESSMENT
pt A&Ox2. forgetful at times. pt denies chest pain, headache or distress. pt in bed eating dinner. pt asymptomatic.

## 2023-02-13 NOTE — PROGRESS NOTE ADULT - PROBLEM SELECTOR PLAN 2
+UA  started on rocephin day 2/3  f/u cultures +UA  started on rocephin day 2/3  f/u cultures  complete course of abx 2/14

## 2023-02-13 NOTE — CARE COORDINATION ASSESSMENT. - NSCAREPROVIDERS_GEN_ALL_CORE_FT
CARE PROVIDERS:  Accepting Physician: Grover Ahmadi  Administration: Jb Tucker  Administration: Chaim Diallo  Administration: Yasir Dutton  Admitting: Grover Ahmadi  Attending: Grover Ahmadi  Case Management: Minerva Grey  Consultant: Esther Philip  Consultant: Arlyn Lord  ED ACP: Deborah Robles  ED Attending: Andrews Noyola  ED Nurse: Doroteo Davalos  Nurse: Palak Callahan  Nurse: Aram Mauro  Nurse: Silvia Jeffrey  Ordered: Gayatri Guillermo  Ordered: Doctor, Unknown  Ordered: ADM, User  PCA/Nursing Assistant: Bernard Coronel  Physical Therapy: Noemi Siddiqi  Primary Team: Latoya Williamson  Primary Team: Zo Bowers  Primary Team: Grover Ahmadi  Primary Team: Andrews Macias  Primary Team: Kris Barragan  Primary Team: Maria Luisa Ortiz  Primary Team: Anthony Lozada  Primary Team: Gosia Eller  Primary Team: Shanon Watson  Registered Dietitian: Vania Feliciano

## 2023-02-13 NOTE — PROGRESS NOTE ADULT - TIME BILLING
Pt seen + examined. Case discussed with resident. Agree with assessment and plan above (edited by me in detail):  Time spent: 50min. More than 50% of the visit was spent counseling the patient on medical condition and coordination of care
Pt seen + examined. Case discussed with resident. Agree with assessment and plan above (edited by me in detail):  Time spent: 52min. More than 50% of the visit was spent counseling the patient on medical condition and coordination of care
Pt seen + examined. Case discussed with resident. Agree with assessment and plan above (edited by me in detail):  Time spent: 55min. More than 50% of the visit was spent counseling the patient on medical condition and coordination of care

## 2023-02-13 NOTE — PROGRESS NOTE ADULT - PROBLEM SELECTOR PLAN 4
Likely anemia of chronic disease  - Hgb 9.9 on admission, was in the ranges of ~10s during last hospitalization in 01/2023  - Currently hemodynamically stable  - F/u iron panel: iron, ferritin, TIBC, transferrin  - Folate WNL and vitamin B12 elevated  - Trend CBC

## 2023-02-13 NOTE — PROGRESS NOTE ADULT - PROBLEM SELECTOR PLAN 5
Chronic  - restarted home metoprolol.   -amlodipine 5mg qd started 2/13 in setting of uncontrolled HTN  - monitor for hypotension   - Monitor routine hemodynamics Chronic  - restarted home metoprolol, dose increased to 25mg BID today for better bp control  - monitor for hypotension   - Monitor routine hemodynamics

## 2023-02-13 NOTE — CARE COORDINATION ASSESSMENT. - OTHER PERTINENT DISCHARGE PLANNING INFORMATION:
CM met with the patient at the bedside and explained role of CM and transition planning. Patient alert and oriented x2. CM made contact with Jessica at [facility] [phone]. Per Jessica, the patient is a new resident there for about 10 days and received from [facility]. Jessica stated the patient had [facility] arranged from [facility] PTA and would like this resumed for the patient. Jessica stated the patient requires a 3122 form (in chart), repeat COVID-19 PCR w/in 72 hrs, and new or changed meds escribed to [facility]. Per Jessica, patient has a WC and RW PTA. Staff assist with medication management. Fax# [phone].     CM made contact with the patient's son Indra [phone] and explained role of CM and transition planning. CM discussed the stated above with Indra. CM discussed transportation and patient's son is in agreement for an ambulance to be arranged upon discharge. CM provided direct contact information to Indra/resource folder at bedside. CM remains available.

## 2023-02-13 NOTE — PATIENT CHOICE NOTE. - NSPTCHOICESTATE_GEN_ALL_CORE

## 2023-02-13 NOTE — PROGRESS NOTE ADULT - SUBJECTIVE AND OBJECTIVE BOX
[facility] -- Jeremi Perkins Pannella, Patel, Savella [facility] [place]  Office # [phone]      Follow Up:    Afib RVR htn   Subjective/Observations:   Seen at bedside, alert confused in no acute distress  denies chest pain dizziness palpitations     REVIEW OF SYSTEMS: All other review of systems is negative unless indicated above    PAST MEDICAL & SURGICAL HISTORY:  Hypertension      Dementia      No significant past surgical history          MEDICATIONS  (STANDING):  aMIOdarone    Tablet   Oral   aMIOdarone    Tablet 400 milliGRAM(s) Oral every 8 hours  cefTRIAXone   IVPB 1000 milliGRAM(s) IV Intermittent every 24 hours  heparin  Infusion.  Unit(s)/Hr (6.5 mL/Hr) IV Continuous <Continuous>  metoprolol tartrate 12.5 milliGRAM(s) Oral two times a day    MEDICATIONS  (PRN):  acetaminophen     Tablet .. 650 milliGRAM(s) Oral every 6 hours PRN Temp greater or equal to 38C (100.4F), Mild Pain (1 - 3)  heparin   Injectable 3200 Unit(s) IV Push every 6 hours PRN For aPTT less than 40  melatonin 3 milliGRAM(s) Oral at bedtime PRN Insomnia  ondansetron Injectable 4 milliGRAM(s) IV Push every 8 hours PRN Nausea and/or Vomiting      Allergies    Allergy Status Unknown  No Known Allergies    Intolerances        Vital Signs Last 24 Hrs  T(C): 36.7 (2023 03:55), Max: 36.8 (2023 20:23)  T(F): 98 (2023 03:55), Max: 98.2 (2023 20:23)  HR: 63 (2023 03:55) (61 - 64)  BP: 167/88 (2023 03:55) (167/88 - 188/79)  BP(mean): --  RR: 18 (2023 03:55) (18 - 18)  SpO2: 95% (2023 03:55) (93% - 95%)    Parameters below as of 2023 03:55  Patient On (Oxygen Delivery Method): room air        I&O's Summary    2023 07:01  -  2023 07:00  --------------------------------------------------------  IN: 518 mL / OUT: 300 mL / NET: 218 mL          PHYSICAL EXAM:  TELE:   Constitutional: NAD, awake and alert, well-developed  HEENT: Moist Mucous Membranes, Anicteric  Pulmonary: Non-labored, breath sounds are clear bilaterally, No wheezing, crackles or rhonchi  Cardiovascular: Regular, S1 and S2 nl, No murmurs, rubs, gallops or clicks  Gastrointestinal: Bowel Sounds present, soft, nontender.   Lymph: No lymphadenopathy. No peripheral edema.  Skin: No visible rashes or ulcers.  Psych:  Mood & affect appropriate, confused     LABS: All Labs Reviewed:                        10.0   07  )-----------( 262      ( 2023 07:33 )             31.8                         8.9    9.33  )-----------( 224      ( 2023 05:10 )             27.6                         9.9    9.02  )-----------( 283      ( 10 Feb 2023 11:00 )             31.6     2023 07:33    140    |  105    |  15     ----------------------------<  84     3.7     |  30     |  0.75   2023 05:10    141    |  108    |  20     ----------------------------<  91     3.8     |  26     |  0.87   10 Feb 2023 11:00    138    |  105    |  19     ----------------------------<  106    3.8     |  26     |  0.90     Ca    8.5        2023 07:33  Ca    8.5        2023 05:10  Ca    9.1        10 Feb 2023 11:00  Phos  2.3       2023 07:33  Phos  2.8       2023 05:10  Phos  3.0       10 Feb 2023 22:28  Mg     2.0       2023 07:33  Mg     2.0       2023 05:10  Mg     1.8       10 2023 22:28    TPro  6.8    /  Alb  2.8    /  TBili  0.4    /  DBili  x      /  AST  13     /  ALT  12     /  AlkPhos  68     2023 07:33  TPro  6.1    /  Alb  2.8    /  TBili  0.3    /  DBili  x      /  AST  17     /  ALT  10     /  AlkPhos  61     2023 05:10  TPro  7.1    /  Alb  3.0    /  TBili  0.5    /  DBili  x      /  AST  12     /  ALT  11     /  AlkPhos  80     10 2023 11:00    PTT - ( 2023 08:15 )  PTT:62.4 sec         EC Lead ECG:   Ventricular Rate 71 BPM    Atrial Rate 71 BPM    P-R Interval 158 ms    QRS Duration 84 ms    Q-T Interval 368 ms    QTC Calculation(Bazett) 399 ms    P Axis 63 degrees    R Axis -28 degrees    T Axis 27 degrees    Diagnosis Line Normal sinus rhythm  Nonspecific ST and T wave abnormality  Abnormal ECG  When compared with ECG of 2023 08:29, (Unconfirmed)  No significant change was found  Confirmed by ROMELIA ALONSO ([id number]) on 2023 2:09:48 PM (23 @ 08:30)      ACC: [id number] EXAM:  ECHO TTE WO CON COMP W DOPP                          PROCEDURE DATE:  2023          INTERPRETATION:  INDICATION: Abnormal EKG  Sonographer KL    Blood Pressure 136/81    Height 158 cm     Weight 57 kg       BSA 1.6 sq m    Dimensions:  LA 2.3       Normal Values: 2.0 - 4.0 cm  Ao 3.3        Normal Values: 2.0 - 3.8 cm  SEPTUM 1.2       Normal Values: 0.6 - 1.2 cm  PWT 1.1       Normal Values: 0.6 - 1.1 cm  LVIDd 4.0         Normal Values: 3.0 - 5.6 cm  LVIDs 2.4  Normal Values: 1.8 - 4.0 cm      OBSERVATIONS:  Mitral Valve: normal, trace physiologic MR.  Aortic Valve/Aorta: Sclerotic trileaflet aortic valve. Trace to mild AI  Tricuspid Valve: normal with trace TR.  Pulmonic Valve: Not well-visualized  Left Atrium: normal  Right Atrium: Not well-visualized  Left Ventricle: normal LV size and systolic function, estimated LVEF of   65%.  Right Ventricle: Grossly normal size and systolic function.  Pericardium: Trace pericardial effusion.      IMPRESSION:  Normal left ventricular internal dimensions and systolic function,   estimated LVEF of 65%.  Grossly normal RV size and systolic function.  Sclerotic trileaflet aortic valve, trace to mild AI.  Trace physiologic MR and TR.  Trace pericardial effusion.    --- End of Report ---            JELLY BINGHAM MD; Attending Cardiologist  This document has been electronically signed. 2023 12:47PM      Radiology:

## 2023-02-13 NOTE — PROGRESS NOTE ADULT - PROBLEM SELECTOR PLAN 1
Pt presents with new onset Afib RVR up to 140s and hypotension  - EKG on admission: Afib with RVR 132bpm, PVCs  - initially held home Metoprolol 12.5 mg BID due to low blood pressures; pt missed dose at rehab due to hypotension. now restarted  - Now on amiodarone    - Patient sons, Tomer and Joshua, who are the medical decision makers, initially refused blood thinner however given pt also with elevated troponin they agreed to starting hep gtt. continue for now. will need further discussion with family regarding A/c going forward. 2/12- son will speak to friend who is cardiologist and decide on A/c.  - troponins trended up overnight- now peaked. 2/2 demand ischemia in setting of rapid afib.   - TSH WNL  - Replete electrolytes as needed  - TTE 1/2023 showed normal LV & RV size and function EF 65%, mild AI, trace MR and TR, trace pericardial effusion.   - [id number] score: 4  - Monitor lytes and replete as needed  - Telemonitoring  - Cardiology Dr. Philip consulted, recs appreciated Pt presents with new onset Afib RVR up to 140s and hypotension  - EKG on admission: Afib with RVR 132bpm, PVCs  - initially held home Metoprolol 12.5 mg BID due to low blood pressures; pt missed dose at rehab due to hypotension. now restarted and pt is hypertensive   - Now on amiodarone    - Patient sons, Tomer and Joshua, who are the medical decision makers, initially refused blood thinner however given pt also with elevated troponin they agreed to starting hep gtt. continue for now. will need further discussion with family regarding A/c going forward. 2/12- son will speak to friend who is cardiologist and decide on A/c.  - troponins trended up - now peaked. 2/2 demand ischemia in setting of rapid afib.   - TSH WNL  - Replete electrolytes as needed  - TTE 1/2023 showed normal LV & RV size and function EF 65%, mild AI, trace MR and TR, trace pericardial effusion.   - [id number] score: 4  - Monitor lytes and replete as needed  - Telemonitoring  - Cardiology Dr. Philip consulted, recs appreciated  - d/c planning for 2/14

## 2023-02-13 NOTE — PROGRESS NOTE ADULT - ASSESSMENT
87 F PMH HTN presents  from [facility] assisted living with low BP and tachycardia, found to be in A fib RVR. Patient was admitted recently here  after an unwitnessed fall, found to have E coli bacteremia and UTI on Jan 14, 23 and discharged 1/20/23. Cardiology was consulted A fib RVR and hypotension     New Onset A fib RVR, HTN  - Tele with A fib -140s converted to SR continue tele monitoring    - initially on Cardizem gtt, now on amio load, continue   - increase bb to 25mg PO BID to optimize bp and heart rate     - EKG showed A fib RVR @ 132.  - No evidence of any active ischemia   - trops elev, peaked and now downtrending, likely in setting of AFib with RVR   - CHADS VASc : 4   - continue hep gtt, , continue ac, family was previously reluctant will need to discuss futher     - ECHO 01/2023 showed normal LV & RV size and function EF 65%, mild AI, trace MR and TR, trace pericardial effusion.   - No evidence of any meaningful volume overload  - Monitor and replete Lytes. Keep K > 4 and Mg > 2  Tana Cage FNP-C  Cardiology NP  [facility] [phone]

## 2023-02-13 NOTE — PHYSICAL THERAPY INITIAL EVALUATION ADULT - PERTINENT HX OF CURRENT PROBLEM, REHAB EVAL
As per H&P, patient presenting with hypotension and found to be in new-onset Afib with RVR. Patient came from [facility] assisted living. During routine vitals check, was found to be hypotensive and her metoprolol was held. Pt was sent to the ED, where she was found to be tachy on the ambulance ride. In the ED, pt was found to be in Afib with RVR. Pt herself had no complaints but did note having some lightheadedness earlier in the ED. Cardio was consulted and pt needed to be started on a Cardizem gtt. Of note, pt was recently hospitalized 1/14/23 - 1/20/23 for UTI and bacteremia - was discharged to [facility] rehab and then subsequently to [facility].

## 2023-02-14 LAB
ALBUMIN SERPL ELPH-MCNC: 2.9 G/DL — LOW (ref 3.3–5)
ALP SERPL-CCNC: 72 U/L — SIGNIFICANT CHANGE UP (ref 40–120)
ALT FLD-CCNC: 12 U/L — SIGNIFICANT CHANGE UP (ref 12–78)
ANION GAP SERPL CALC-SCNC: 7 MMOL/L — SIGNIFICANT CHANGE UP (ref 5–17)
APTT BLD: 128.7 SEC — CRITICAL HIGH (ref 27.5–35.5)
APTT BLD: 93.5 SEC — HIGH (ref 27.5–35.5)
APTT BLD: 95.9 SEC — HIGH (ref 27.5–35.5)
APTT BLD: 98.8 SEC — HIGH (ref 27.5–35.5)
AST SERPL-CCNC: 9 U/L — LOW (ref 15–37)
BASOPHILS # BLD AUTO: 0.05 K/UL — SIGNIFICANT CHANGE UP (ref 0–0.2)
BASOPHILS NFR BLD AUTO: 0.6 % — SIGNIFICANT CHANGE UP (ref 0–2)
BILIRUB SERPL-MCNC: 0.4 MG/DL — SIGNIFICANT CHANGE UP (ref 0.2–1.2)
BUN SERPL-MCNC: 9 MG/DL — SIGNIFICANT CHANGE UP (ref 7–23)
CALCIUM SERPL-MCNC: 8.7 MG/DL — SIGNIFICANT CHANGE UP (ref 8.5–10.1)
CHLORIDE SERPL-SCNC: 103 MMOL/L — SIGNIFICANT CHANGE UP (ref 96–108)
CO2 SERPL-SCNC: 29 MMOL/L — SIGNIFICANT CHANGE UP (ref 22–31)
CREAT SERPL-MCNC: 0.8 MG/DL — SIGNIFICANT CHANGE UP (ref 0.5–1.3)
EGFR: 72 ML/MIN/1.73M2 — SIGNIFICANT CHANGE UP
EOSINOPHIL # BLD AUTO: 0.14 K/UL — SIGNIFICANT CHANGE UP (ref 0–0.5)
EOSINOPHIL NFR BLD AUTO: 1.8 % — SIGNIFICANT CHANGE UP (ref 0–6)
GLUCOSE SERPL-MCNC: 106 MG/DL — HIGH (ref 70–99)
HCT VFR BLD CALC: 30.7 % — LOW (ref 34.5–45)
HCT VFR BLD CALC: 32.4 % — LOW (ref 34.5–45)
HGB BLD-MCNC: 10.3 G/DL — LOW (ref 11.5–15.5)
HGB BLD-MCNC: 9.6 G/DL — LOW (ref 11.5–15.5)
IMM GRANULOCYTES NFR BLD AUTO: 1 % — HIGH (ref 0–0.9)
LYMPHOCYTES # BLD AUTO: 1.23 K/UL — SIGNIFICANT CHANGE UP (ref 1–3.3)
LYMPHOCYTES # BLD AUTO: 15.6 % — SIGNIFICANT CHANGE UP (ref 13–44)
MCHC RBC-ENTMCNC: 29.3 PG — SIGNIFICANT CHANGE UP (ref 27–34)
MCHC RBC-ENTMCNC: 30.1 PG — SIGNIFICANT CHANGE UP (ref 27–34)
MCHC RBC-ENTMCNC: 31.3 GM/DL — LOW (ref 32–36)
MCHC RBC-ENTMCNC: 31.8 GM/DL — LOW (ref 32–36)
MCV RBC AUTO: 93.6 FL — SIGNIFICANT CHANGE UP (ref 80–100)
MCV RBC AUTO: 94.7 FL — SIGNIFICANT CHANGE UP (ref 80–100)
MONOCYTES # BLD AUTO: 0.53 K/UL — SIGNIFICANT CHANGE UP (ref 0–0.9)
MONOCYTES NFR BLD AUTO: 6.7 % — SIGNIFICANT CHANGE UP (ref 2–14)
NEUTROPHILS # BLD AUTO: 5.84 K/UL — SIGNIFICANT CHANGE UP (ref 1.8–7.4)
NEUTROPHILS NFR BLD AUTO: 74.3 % — SIGNIFICANT CHANGE UP (ref 43–77)
NRBC # BLD: 0 /100 WBCS — SIGNIFICANT CHANGE UP (ref 0–0)
NRBC # BLD: 0 /100 WBCS — SIGNIFICANT CHANGE UP (ref 0–0)
PLATELET # BLD AUTO: 255 K/UL — SIGNIFICANT CHANGE UP (ref 150–400)
PLATELET # BLD AUTO: 282 K/UL — SIGNIFICANT CHANGE UP (ref 150–400)
POTASSIUM SERPL-MCNC: 3.8 MMOL/L — SIGNIFICANT CHANGE UP (ref 3.5–5.3)
POTASSIUM SERPL-SCNC: 3.8 MMOL/L — SIGNIFICANT CHANGE UP (ref 3.5–5.3)
PROT SERPL-MCNC: 7 G/DL — SIGNIFICANT CHANGE UP (ref 6–8.3)
RBC # BLD: 3.28 M/UL — LOW (ref 3.8–5.2)
RBC # BLD: 3.42 M/UL — LOW (ref 3.8–5.2)
RBC # FLD: 14.9 % — HIGH (ref 10.3–14.5)
RBC # FLD: 15.1 % — HIGH (ref 10.3–14.5)
SARS-COV-2 RNA SPEC QL NAA+PROBE: SIGNIFICANT CHANGE UP
SODIUM SERPL-SCNC: 139 MMOL/L — SIGNIFICANT CHANGE UP (ref 135–145)
WBC # BLD: 10.48 K/UL — SIGNIFICANT CHANGE UP (ref 3.8–10.5)
WBC # BLD: 7.87 K/UL — SIGNIFICANT CHANGE UP (ref 3.8–10.5)
WBC # FLD AUTO: 10.48 K/UL — SIGNIFICANT CHANGE UP (ref 3.8–10.5)
WBC # FLD AUTO: 7.87 K/UL — SIGNIFICANT CHANGE UP (ref 3.8–10.5)

## 2023-02-14 PROCEDURE — 99232 SBSQ HOSP IP/OBS MODERATE 35: CPT

## 2023-02-14 PROCEDURE — 99233 SBSQ HOSP IP/OBS HIGH 50: CPT | Mod: GC

## 2023-02-14 RX ORDER — AMLODIPINE BESYLATE 2.5 MG/1
5 TABLET ORAL DAILY
Refills: 0 | Status: DISCONTINUED | OUTPATIENT
Start: 2023-02-14 | End: 2023-02-16

## 2023-02-14 RX ADMIN — Medication 25 MILLIGRAM(S): at 17:44

## 2023-02-14 RX ADMIN — Medication 25 MILLIGRAM(S): at 05:32

## 2023-02-14 RX ADMIN — CEFTRIAXONE 100 MILLIGRAM(S): 500 INJECTION, POWDER, FOR SOLUTION INTRAMUSCULAR; INTRAVENOUS at 13:05

## 2023-02-14 RX ADMIN — AMIODARONE HYDROCHLORIDE 400 MILLIGRAM(S): 400 TABLET ORAL at 05:31

## 2023-02-14 RX ADMIN — ONDANSETRON 4 MILLIGRAM(S): 8 TABLET, FILM COATED ORAL at 03:30

## 2023-02-14 RX ADMIN — AMLODIPINE BESYLATE 5 MILLIGRAM(S): 2.5 TABLET ORAL at 12:58

## 2023-02-14 RX ADMIN — AMIODARONE HYDROCHLORIDE 400 MILLIGRAM(S): 400 TABLET ORAL at 13:05

## 2023-02-14 RX ADMIN — HEPARIN SODIUM 800 UNIT(S)/HR: 5000 INJECTION INTRAVENOUS; SUBCUTANEOUS at 07:11

## 2023-02-14 RX ADMIN — HEPARIN SODIUM 800 UNIT(S)/HR: 5000 INJECTION INTRAVENOUS; SUBCUTANEOUS at 15:15

## 2023-02-14 RX ADMIN — HEPARIN SODIUM 0 UNIT(S)/HR: 5000 INJECTION INTRAVENOUS; SUBCUTANEOUS at 01:00

## 2023-02-14 RX ADMIN — HEPARIN SODIUM 800 UNIT(S)/HR: 5000 INJECTION INTRAVENOUS; SUBCUTANEOUS at 19:20

## 2023-02-14 RX ADMIN — HEPARIN SODIUM 800 UNIT(S)/HR: 5000 INJECTION INTRAVENOUS; SUBCUTANEOUS at 02:07

## 2023-02-14 RX ADMIN — HEPARIN SODIUM 800 UNIT(S)/HR: 5000 INJECTION INTRAVENOUS; SUBCUTANEOUS at 10:04

## 2023-02-14 RX ADMIN — AMIODARONE HYDROCHLORIDE 400 MILLIGRAM(S): 400 TABLET ORAL at 21:47

## 2023-02-14 NOTE — PROGRESS NOTE ADULT - ASSESSMENT
87 F PMH HTN presents  from [facility] assisted living with low BP and tachycardia, found to be in A fib RVR. Patient was admitted recently here  after an unwitnessed fall, found to have E coli bacteremia and UTI on Jan 14, 23 and discharged 1/20/23. Cardiology was consulted A fib RVR and hypotension     New Onset A fib RVR, HTN  - Tele with A fib -140s converted to SR continue tele monitoring    - initially on Cardizem gtt, now on amio load, continue   - BB 2/13 to 25 mg PO BID    - EKG showed A fib RVR @ 132.  - No evidence of any active ischemia   - trops elev, peaked and now downtrending, likely in setting of AFib with RVR   - CHADS VASc : 4   - continue hep gtt, , continue ac, family was previously reluctant will need to discuss futher     - ECHO 01/2023 showed normal LV & RV size and function EF 65%, mild AI, trace MR and TR, trace pericardial effusion.   - No evidence of any meaningful volume overload  - Monitor and replete Lytes. Keep K > 4 and Mg > 2  Tana Cage FNP-C  Cardiology NP  [facility] [phone]     87 F PMH HTN presents  from [facility] assisted living with low BP and tachycardia, found to be in A fib RVR. Patient was admitted recently here  after an unwitnessed fall, found to have E coli bacteremia and UTI on Jan 14, 23 and discharged 1/20/23. Cardiology was consulted A fib RVR and hypotension     New Onset A fib RVR, HTN  - Tele with A fib -140s converted to SR   - initially on Cardizem gtt, now on amio load, continue   - BB increased  2/13 to 25 mg PO BID, repeat blood pressure now that AM medications given     - EKG showed A fib RVR @ 132.  - No evidence of any active ischemia   - trops elev, peaked and now downtrending, likely in setting of AFib with RVR   - CHADS VASc : 4   - continue hep gtt, , continue ac, family was previously reluctant will need to discuss futher     - ECHO 01/2023 showed normal LV & RV size and function EF 65%, mild AI, trace MR and TR, trace pericardial effusion.   - No evidence of any meaningful volume overload  - Monitor and replete Lytes. Keep K > 4 and Mg > 2  Tana Cage FNP-C  Cardiology NP  [facility] [phone]

## 2023-02-14 NOTE — PROGRESS NOTE ADULT - PROBLEM SELECTOR PLAN 1
Pt presents with new onset Afib RVR up to 140s and hypotension  - EKG on admission: Afib with RVR 132bpm, PVCs  - initially held home Metoprolol 12.5 mg BID due to low blood pressures; pt missed dose at rehab due to hypotension. now restarted and pt is hypertensive   - Now on amiodarone    - Patient sons, Tomer and Joshua, who are the medical decision makers, initially refused blood thinner however given pt also with elevated troponin they agreed to starting hep gtt. continue for now. will need further discussion with family regarding A/c going forward. 2/12- son will speak to friend who is cardiologist and decide on A/c.  - troponins trended up - now peaked. 2/2 demand ischemia in setting of rapid afib.   - TSH WNL  - Replete electrolytes as needed  - TTE 1/2023 showed normal LV & RV size and function EF 65%, mild AI, trace MR and TR, trace pericardial effusion.   - [id number] score: 4  - Monitor lytes and replete as needed  - Telemonitoring  - Cardiology Dr. Philip consulted, recs appreciated  - d/c planning for 2/14 Pt presents with new onset Afib RVR up to 140s and hypotension  - EKG on admission: Afib with RVR 132bpm, PVCs  - initially held home Metoprolol 12.5 mg BID due to low blood pressures; pt missed dose at rehab due to hypotension. now restarted and pt is hypertensive   - Now on amiodarone    - Patient sons, Tomer and Joshua, who are the medical decision makers, initially refused blood thinner however given pt also with elevated troponin they agreed to starting hep gtt. continue for now. will need further discussion with family regarding A/c going forward. Son still to decide on longer term anticoagulation. Could not reach by phone today. Will reattempt contact again tomorrow.   - troponins trended up - now peaked. 2/2 demand ischemia in setting of rapid afib.   - TSH WNL  - Replete electrolytes as needed  - TTE 1/2023 showed normal LV & RV size and function EF 65%, mild AI, trace MR and TR, trace pericardial effusion.   - [id number] score: 4  - Monitor lytes and replete as needed  - Telemonitoring  - Cardiology Dr. Philip consulted, recs appreciated  - d/c planning for 2/15 if BP controlled.

## 2023-02-14 NOTE — PROGRESS NOTE ADULT - SUBJECTIVE AND OBJECTIVE BOX
[facility] -- Jeremi Perkins Pannella, Patel, Savella [facility]  Office # [phone]      Follow Up:    afib htn   Subjective/Observations:   No events overnight resting comfortably in bed.  No complaints of chest pain, dyspnea, or palpitations reported. No signs of orthopnea or PND.      REVIEW OF SYSTEMS: All other review of systems is negative unless indicated above    PAST MEDICAL & SURGICAL HISTORY:  Hypertension      Dementia      No significant past surgical history          MEDICATIONS  (STANDING):  aMIOdarone    Tablet   Oral   aMIOdarone    Tablet 400 milliGRAM(s) Oral every 8 hours  cefTRIAXone   IVPB 1000 milliGRAM(s) IV Intermittent every 24 hours  heparin  Infusion.  Unit(s)/Hr (10 mL/Hr) IV Continuous <Continuous>  metoprolol tartrate 25 milliGRAM(s) Oral two times a day    MEDICATIONS  (PRN):  acetaminophen     Tablet .. 650 milliGRAM(s) Oral every 6 hours PRN Temp greater or equal to 38C (100.4F), Mild Pain (1 - 3)  heparin   Injectable 4000 Unit(s) IV Push every 6 hours PRN For aPTT less than 40  heparin   Injectable 2000 Unit(s) IV Push every 6 hours PRN For aPTT between 40 - 57  melatonin 3 milliGRAM(s) Oral at bedtime PRN Insomnia  ondansetron Injectable 4 milliGRAM(s) IV Push every 8 hours PRN Nausea and/or Vomiting      Allergies    Allergy Status Unknown  No Known Allergies    Intolerances        Vital Signs Last 24 Hrs  T(C): 36.3 (2023 03:37), Max: 36.9 (2023 12:23)  T(F): 97.4 (2023 03:37), Max: 98.5 (2023 12:23)  HR: 58 (2023 03:37) (58 - 72)  BP: 175/83 (2023 03:37) (162/87 - 178/83)  BP(mean): --  RR: 19 (2023 03:37) (18 - 19)  SpO2: 97% (2023 03:37) (95% - 97%)    Parameters below as of 2023 03:37  Patient On (Oxygen Delivery Method): room air        I&O's Summary    2023 07:01  -  2023 07:00  --------------------------------------------------------  IN: 500 mL / OUT: 200 mL / NET: 300 mL          PHYSICAL EXAM:  TELE: SR  Constitutional: NAD, awake and alert,  HEENT: Moist Mucous Membranes, Anicteric  Pulmonary: Non-labored, breath sounds are clear bilaterally, No wheezing, crackles or rhonchi  Cardiovascular: Regular, S1 and S2 nl, No murmurs, rubs, gallops or clicks  Gastrointestinal: Bowel Sounds present, soft, nontender.   Lymph: No lymphadenopathy. No peripheral edema.  Skin: No visible rashes or ulcers.  Psych:  Mood & affect appropriate confused    LABS: All Labs Reviewed:                        10.3   7  )-----------( 282      ( 2023 06:55 )             32.4                         9.6    10.48 )-----------( 255      ( 2023 00:22 )             30.7                         10.4   8.87  )-----------( 277      ( 2023 07:20 )             33.0     2023 07:20    140    |  106    |  10     ----------------------------<  96     3.8     |  27     |  0.80   2023 07:33    140    |  105    |  15     ----------------------------<  84     3.7     |  30     |  0.75     Ca    8.6        2023 07:20  Ca    8.5        2023 07:33  Phos  3.1       2023 07:20  Phos  2.3       2023 07:33  Mg     2.0       2023 07:20  Mg     2.0       2023 07:33    TPro  6.8    /  Alb  2.8    /  TBili  0.4    /  DBili  x      /  AST  13     /  ALT  12     /  AlkPhos  68     2023 07:33    PTT - ( 2023 00:22 )  PTT:128.7 sec         EC Lead ECG:   Ventricular Rate 71 BPM    Atrial Rate 71 BPM    P-R Interval 158 ms    QRS Duration 84 ms    Q-T Interval 368 ms    QTC Calculation(Bazett) 399 ms    P Axis 63 degrees    R Axis -28 degrees    T Axis 27 degrees    Diagnosis Line Normal sinus rhythm  Nonspecific ST and T wave abnormality  Abnormal ECG  When compared with ECG of 2023 08:29, (Unconfirmed)  No significant change was found  Confirmed by ROMELIA ALONSO ([id number]) on 2023 2:09:48 PM (23 @ 08:30)      ACC: [id number] EXAM:  ECHO TTE WO CON COMP W DOPP                          PROCEDURE DATE:  2023          INTERPRETATION:  INDICATION: Abnormal EKG  Sonographer KL    Blood Pressure 136/81    Height 158 cm     Weight 57 kg       BSA 1.6 sq m    Dimensions:  LA 2.3       Normal Values: 2.0 - 4.0 cm  Ao 3.3        Normal Values: 2.0 - 3.8 cm  SEPTUM 1.2       Normal Values: 0.6 - 1.2 cm  PWT 1.1       Normal Values: 0.6 - 1.1 cm  LVIDd 4.0         Normal Values: 3.0 - 5.6 cm  LVIDs 2.4  Normal Values: 1.8 - 4.0 cm      OBSERVATIONS:  Mitral Valve: normal, trace physiologic MR.  Aortic Valve/Aorta: Sclerotic trileaflet aortic valve. Trace to mild AI  Tricuspid Valve: normal with trace TR.  Pulmonic Valve: Not well-visualized  Left Atrium: normal  Right Atrium: Not well-visualized  Left Ventricle: normal LV size and systolic function, estimated LVEF of   65%.  Right Ventricle: Grossly normal size and systolic function.  Pericardium: Trace pericardial effusion.      IMPRESSION:  Normal left ventricular internal dimensions and systolic function,   estimated LVEF of 65%.  Grossly normal RV size and systolic function.  Sclerotic trileaflet aortic valve, trace to mild AI.  Trace physiologic MR and TR.  Trace pericardial effusion.    --- End of Report ---            JELLY BINGHAM MD; Attending Cardiologist  This document has been electronically signed. 2023 12:47PM      Radiology:         [facility] -- Jeremi Perkins Pannella, Patel, Savella [facility]  Office # [phone]      Follow Up:    afib htn   Subjective/Observations:   No events overnight resting comfortably in bed.  No complaints of chest pain, dyspnea, or palpitations reported. No signs of orthopnea or PND.  confused unable to provide much meaningful information     REVIEW OF SYSTEMS: All other review of systems is negative unless indicated above    PAST MEDICAL & SURGICAL HISTORY:  Hypertension      Dementia      No significant past surgical history          MEDICATIONS  (STANDING):  aMIOdarone    Tablet   Oral   aMIOdarone    Tablet 400 milliGRAM(s) Oral every 8 hours  cefTRIAXone   IVPB 1000 milliGRAM(s) IV Intermittent every 24 hours  heparin  Infusion.  Unit(s)/Hr (10 mL/Hr) IV Continuous <Continuous>  metoprolol tartrate 25 milliGRAM(s) Oral two times a day    MEDICATIONS  (PRN):  acetaminophen     Tablet .. 650 milliGRAM(s) Oral every 6 hours PRN Temp greater or equal to 38C (100.4F), Mild Pain (1 - 3)  heparin   Injectable 4000 Unit(s) IV Push every 6 hours PRN For aPTT less than 40  heparin   Injectable 2000 Unit(s) IV Push every 6 hours PRN For aPTT between 40 - 57  melatonin 3 milliGRAM(s) Oral at bedtime PRN Insomnia  ondansetron Injectable 4 milliGRAM(s) IV Push every 8 hours PRN Nausea and/or Vomiting      Allergies    Allergy Status Unknown  No Known Allergies    Intolerances        Vital Signs Last 24 Hrs  T(C): 36.3 (2023 03:37), Max: 36.9 (2023 12:23)  T(F): 97.4 (2023 03:37), Max: 98.5 (2023 12:23)  HR: 58 (2023 03:37) (58 - 72)  BP: 175/83 (2023 03:37) (162/87 - 178/83)  BP(mean): --  RR: 19 (2023 03:37) (18 - 19)  SpO2: 97% (2023 03:37) (95% - 97%)    Parameters below as of 2023 03:37  Patient On (Oxygen Delivery Method): room air        I&O's Summary    2023 07:01  -  2023 07:00  --------------------------------------------------------  IN: 500 mL / OUT: 200 mL / NET: 300 mL          PHYSICAL EXAM:  TELE: SR  Constitutional: NAD, awake and alert,  HEENT: Moist Mucous Membranes, Anicteric  Pulmonary: Non-labored, breath sounds are clear bilaterally, No wheezing, crackles or rhonchi  Cardiovascular: Regular, S1 and S2 nl, No murmurs, rubs, gallops or clicks  Gastrointestinal: Bowel Sounds present, soft, nontender.   Lymph: No lymphadenopathy. No peripheral edema.  Skin: No visible rashes or ulcers.  Psych:  Mood & affect appropriate confused    LABS: All Labs Reviewed:                        10.3   7  )-----------( 282      ( 2023 06:55 )             32.4                         9.6    10.48 )-----------( 255      ( 2023 00:22 )             30.7                         10.4   8.87  )-----------( 277      ( 2023 07:20 )             33.0     2023 07:20    140    |  106    |  10     ----------------------------<  96     3.8     |  27     |  0.80   2023 07:33    140    |  105    |  15     ----------------------------<  84     3.7     |  30     |  0.75     Ca    8.6        2023 07:20  Ca    8.5        2023 07:33  Phos  3.1       2023 07:20  Phos  2.3       2023 07:33  Mg     2.0       2023 07:20  Mg     2.0       2023 07:33    TPro  6.8    /  Alb  2.8    /  TBili  0.4    /  DBili  x      /  AST  13     /  ALT  12     /  AlkPhos  68     2023 07:33    PTT - ( 2023 00:22 )  PTT:128.7 sec         EC Lead ECG:   Ventricular Rate 71 BPM    Atrial Rate 71 BPM    P-R Interval 158 ms    QRS Duration 84 ms    Q-T Interval 368 ms    QTC Calculation(Bazett) 399 ms    P Axis 63 degrees    R Axis -28 degrees    T Axis 27 degrees    Diagnosis Line Normal sinus rhythm  Nonspecific ST and T wave abnormality  Abnormal ECG  When compared with ECG of 2023 08:29, (Unconfirmed)  No significant change was found  Confirmed by ROMELIA ALONSO ([id number]) on 2023 2:09:48 PM (23 @ 08:30)      ACC: [id number] EXAM:  ECHO TTE WO CON COMP W DOPP                          PROCEDURE DATE:  2023          INTERPRETATION:  INDICATION: Abnormal EKG  Sonographer KL    Blood Pressure 136/81    Height 158 cm     Weight 57 kg       BSA 1.6 sq m    Dimensions:  LA 2.3       Normal Values: 2.0 - 4.0 cm  Ao 3.3        Normal Values: 2.0 - 3.8 cm  SEPTUM 1.2       Normal Values: 0.6 - 1.2 cm  PWT 1.1       Normal Values: 0.6 - 1.1 cm  LVIDd 4.0         Normal Values: 3.0 - 5.6 cm  LVIDs 2.4  Normal Values: 1.8 - 4.0 cm      OBSERVATIONS:  Mitral Valve: normal, trace physiologic MR.  Aortic Valve/Aorta: Sclerotic trileaflet aortic valve. Trace to mild AI  Tricuspid Valve: normal with trace TR.  Pulmonic Valve: Not well-visualized  Left Atrium: normal  Right Atrium: Not well-visualized  Left Ventricle: normal LV size and systolic function, estimated LVEF of   65%.  Right Ventricle: Grossly normal size and systolic function.  Pericardium: Trace pericardial effusion.      IMPRESSION:  Normal left ventricular internal dimensions and systolic function,   estimated LVEF of 65%.  Grossly normal RV size and systolic function.  Sclerotic trileaflet aortic valve, trace to mild AI.  Trace physiologic MR and TR.  Trace pericardial effusion.    --- End of Report ---            JELLY BINGHAM MD; Attending Cardiologist  This document has been electronically signed. 2023 12:47PM      Radiology:

## 2023-02-14 NOTE — PROGRESS NOTE ADULT - PROBLEM SELECTOR PLAN 5
Chronic  - restarted home metoprolol, dose increased to 25mg BID today for better bp control  - monitor for hypotension   - Monitor routine hemodynamics Chronic  BP has been elevated to systolic 170's 2/14  - metoprolol, dose increased to 25mg BID today for better bp control  - Amlodipine 5mg qd added 2/14 for better BP control  - monitor for hypotension   - Monitor routine hemodynamics

## 2023-02-14 NOTE — PROGRESS NOTE ADULT - SUBJECTIVE AND OBJECTIVE BOX
Patient is a 87y old  Female who presents with a chief complaint of Afib with RVR (14 Feb 2023 07:26)    INTERVAL HPI/OVERNIGHT EVENTS: Patient seen and examined at bedside. No overnight events occurred. Patient has no complaints at this time. Denies fevers, chills, headache, lightheadedness, chest pain, dyspnea, abdominal pain, n/v/d/c.    MEDICATIONS  (STANDING):  aMIOdarone    Tablet   Oral   aMIOdarone    Tablet 400 milliGRAM(s) Oral every 8 hours  amLODIPine   Tablet 5 milliGRAM(s) Oral daily  heparin  Infusion.  Unit(s)/Hr (10 mL/Hr) IV Continuous <Continuous>  metoprolol tartrate 25 milliGRAM(s) Oral two times a day    MEDICATIONS  (PRN):  acetaminophen     Tablet .. 650 milliGRAM(s) Oral every 6 hours PRN Temp greater or equal to 38C (100.4F), Mild Pain (1 - 3)  heparin   Injectable 4000 Unit(s) IV Push every 6 hours PRN For aPTT less than 40  heparin   Injectable 2000 Unit(s) IV Push every 6 hours PRN For aPTT between 40 - 57  melatonin 3 milliGRAM(s) Oral at bedtime PRN Insomnia  ondansetron Injectable 4 milliGRAM(s) IV Push every 8 hours PRN Nausea and/or Vomiting    Allergies    Allergy Status Unknown  No Known Allergies    Intolerances    REVIEW OF SYSTEMS:  CONSTITUTIONAL: No fever or chills  HEENT:  No headache, no sore throat  RESPIRATORY: No cough, wheezing, or shortness of breath  CARDIOVASCULAR: No chest pain, palpitations  GASTROINTESTINAL: No abd pain, nausea, vomiting, or diarrhea  GENITOURINARY: No dysuria, frequency, or hematuria  NEUROLOGICAL: no focal weakness or dizziness  MUSCULOSKELETAL: no myalgias     Vital Signs Last 24 Hrs  T(C): 36.3 (14 Feb 2023 03:37), Max: 36.7 (13 Feb 2023 19:34)  T(F): 97.4 (14 Feb 2023 03:37), Max: 98.1 (13 Feb 2023 19:34)  HR: 58 (14 Feb 2023 03:37) (58 - 72)  BP: 175/83 (14 Feb 2023 03:37) (172/81 - 178/83)  BP(mean): --  RR: 19 (14 Feb 2023 03:37) (18 - 19)  SpO2: 97% (14 Feb 2023 03:37) (97% - 97%)    Parameters below as of 14 Feb 2023 03:37  Patient On (Oxygen Delivery Method): room air    PHYSICAL EXAM:  GENERAL: NAD  HEENT:  anicteric, moist mucous membranes  CHEST/LUNG:  CTA b/l, no rales, wheezes, or rhonchi  HEART:  RRR, S1, S2  ABDOMEN:  BS+, soft, nontender, nondistended  EXTREMITIES: no edema, cyanosis, or calf tenderness  NERVOUS SYSTEM: answers questions and follows commands appropriately, occasionally confused but otherwise oriented    LABS:                        10.3   7.87  )-----------( 282      ( 14 Feb 2023 06:55 )             32.4     CBC Full  -  ( 14 Feb 2023 06:55 )  WBC Count : 7.87 K/uL  Hemoglobin : 10.3 g/dL  Hematocrit : 32.4 %  Platelet Count - Automated : 282 K/uL  Mean Cell Volume : 94.7 fl  Mean Cell Hemoglobin : 30.1 pg  Mean Cell Hemoglobin Concentration : 31.8 gm/dL  Auto Neutrophil # : 5.84 K/uL  Auto Lymphocyte # : 1.23 K/uL  Auto Monocyte # : 0.53 K/uL  Auto Eosinophil # : 0.14 K/uL  Auto Basophil # : 0.05 K/uL  Auto Neutrophil % : 74.3 %  Auto Lymphocyte % : 15.6 %  Auto Monocyte % : 6.7 %  Auto Eosinophil % : 1.8 %  Auto Basophil % : 0.6 %    14 Feb 2023 06:55    139    |  103    |  9      ----------------------------<  106    3.8     |  29     |  0.80     Ca    8.7        14 Feb 2023 06:55    TPro  7.0    /  Alb  2.9    /  TBili  0.4    /  DBili  x      /  AST  9      /  ALT  12     /  AlkPhos  72     14 Feb 2023 06:55    PTT - ( 14 Feb 2023 08:15 )  PTT:98.8 sec    CAPILLARY BLOOD GLUCOSE  Culture - Blood (collected 02-10-23 @ 12:40)  Source: .Blood Blood-Peripheral  Preliminary Report (02-11-23 @ 18:01):    No growth to date.    Culture - Blood (collected 02-10-23 @ 12:40)  Source: .Blood Blood-Peripheral  Preliminary Report (02-11-23 @ 18:01):    No growth to date.    RADIOLOGY & ADDITIONAL TESTS:    < from: Xray Chest 1 View AP/PA (02.10.23 @ 11:48) >  ACC: [id number] EXAM:  XR CHEST AP OR PA 1V   ORDERED BY: ILA DOLL     PROCEDURE DATE:  02/10/2023          INTERPRETATION:  INDICATION: Sepsis    Portable chest 11:25 AM    COMPARISON: 1/14/2023    Overlying EKG leads and wires.    FINDINGS:  Heart/Vascular: The heart size, mediastinum, hilum and aorta are within   normal limits for projection. Atherosclerotic changes.  Pulmonary: Midline trachea. The right lung is grossly clear.Left   costophrenic angle is blunted suggesting small pleural effusion.    Bones: There is no fracture.  Lines and catheter: None    Impression:    Left costophrenic angle is blunted suggesting small pleural effusion.    --- End of Report ---    < end of copied text >      Personally reviewed.     Consultant(s) Notes Reviewed:  [x] YES  [ ] NO

## 2023-02-14 NOTE — PROGRESS NOTE ADULT - PROBLEM SELECTOR PLAN 2
+UA  started on rocephin day 2/3  f/u cultures  complete course of abx 2/14 RESOLVED    asymptomatic  +UA  culture grew E coli  completed 3 day course rocephin

## 2023-02-15 LAB
ALBUMIN SERPL ELPH-MCNC: 2.8 G/DL — LOW (ref 3.3–5)
ALP SERPL-CCNC: 70 U/L — SIGNIFICANT CHANGE UP (ref 40–120)
ALT FLD-CCNC: 12 U/L — SIGNIFICANT CHANGE UP (ref 12–78)
ANION GAP SERPL CALC-SCNC: 6 MMOL/L — SIGNIFICANT CHANGE UP (ref 5–17)
APTT BLD: 133.7 SEC — CRITICAL HIGH (ref 27.5–35.5)
APTT BLD: 61.7 SEC — HIGH (ref 27.5–35.5)
AST SERPL-CCNC: 13 U/L — LOW (ref 15–37)
BASOPHILS # BLD AUTO: 0.05 K/UL — SIGNIFICANT CHANGE UP (ref 0–0.2)
BASOPHILS NFR BLD AUTO: 0.6 % — SIGNIFICANT CHANGE UP (ref 0–2)
BILIRUB SERPL-MCNC: 0.3 MG/DL — SIGNIFICANT CHANGE UP (ref 0.2–1.2)
BUN SERPL-MCNC: 13 MG/DL — SIGNIFICANT CHANGE UP (ref 7–23)
CALCIUM SERPL-MCNC: 8.7 MG/DL — SIGNIFICANT CHANGE UP (ref 8.5–10.1)
CHLORIDE SERPL-SCNC: 105 MMOL/L — SIGNIFICANT CHANGE UP (ref 96–108)
CO2 SERPL-SCNC: 29 MMOL/L — SIGNIFICANT CHANGE UP (ref 22–31)
CREAT SERPL-MCNC: 0.84 MG/DL — SIGNIFICANT CHANGE UP (ref 0.5–1.3)
CULTURE RESULTS: SIGNIFICANT CHANGE UP
CULTURE RESULTS: SIGNIFICANT CHANGE UP
EGFR: 68 ML/MIN/1.73M2 — SIGNIFICANT CHANGE UP
EOSINOPHIL # BLD AUTO: 0.11 K/UL — SIGNIFICANT CHANGE UP (ref 0–0.5)
EOSINOPHIL NFR BLD AUTO: 1.4 % — SIGNIFICANT CHANGE UP (ref 0–6)
GLUCOSE SERPL-MCNC: 87 MG/DL — SIGNIFICANT CHANGE UP (ref 70–99)
HCT VFR BLD CALC: 32.5 % — LOW (ref 34.5–45)
HGB BLD-MCNC: 10.5 G/DL — LOW (ref 11.5–15.5)
IMM GRANULOCYTES NFR BLD AUTO: 1.1 % — HIGH (ref 0–0.9)
LYMPHOCYTES # BLD AUTO: 1.29 K/UL — SIGNIFICANT CHANGE UP (ref 1–3.3)
LYMPHOCYTES # BLD AUTO: 16.1 % — SIGNIFICANT CHANGE UP (ref 13–44)
MAGNESIUM SERPL-MCNC: 2 MG/DL — SIGNIFICANT CHANGE UP (ref 1.6–2.6)
MCHC RBC-ENTMCNC: 30.5 PG — SIGNIFICANT CHANGE UP (ref 27–34)
MCHC RBC-ENTMCNC: 32.3 GM/DL — SIGNIFICANT CHANGE UP (ref 32–36)
MCV RBC AUTO: 94.5 FL — SIGNIFICANT CHANGE UP (ref 80–100)
MONOCYTES # BLD AUTO: 0.65 K/UL — SIGNIFICANT CHANGE UP (ref 0–0.9)
MONOCYTES NFR BLD AUTO: 8.1 % — SIGNIFICANT CHANGE UP (ref 2–14)
NEUTROPHILS # BLD AUTO: 5.83 K/UL — SIGNIFICANT CHANGE UP (ref 1.8–7.4)
NEUTROPHILS NFR BLD AUTO: 72.7 % — SIGNIFICANT CHANGE UP (ref 43–77)
NRBC # BLD: 0 /100 WBCS — SIGNIFICANT CHANGE UP (ref 0–0)
PHOSPHATE SERPL-MCNC: 2.7 MG/DL — SIGNIFICANT CHANGE UP (ref 2.5–4.5)
PLATELET # BLD AUTO: 269 K/UL — SIGNIFICANT CHANGE UP (ref 150–400)
POTASSIUM SERPL-MCNC: 3.7 MMOL/L — SIGNIFICANT CHANGE UP (ref 3.5–5.3)
POTASSIUM SERPL-SCNC: 3.7 MMOL/L — SIGNIFICANT CHANGE UP (ref 3.5–5.3)
PROT SERPL-MCNC: 6.9 G/DL — SIGNIFICANT CHANGE UP (ref 6–8.3)
RBC # BLD: 3.44 M/UL — LOW (ref 3.8–5.2)
RBC # FLD: 15 % — HIGH (ref 10.3–14.5)
SODIUM SERPL-SCNC: 140 MMOL/L — SIGNIFICANT CHANGE UP (ref 135–145)
SPECIMEN SOURCE: SIGNIFICANT CHANGE UP
SPECIMEN SOURCE: SIGNIFICANT CHANGE UP
WBC # BLD: 8.02 K/UL — SIGNIFICANT CHANGE UP (ref 3.8–10.5)
WBC # FLD AUTO: 8.02 K/UL — SIGNIFICANT CHANGE UP (ref 3.8–10.5)

## 2023-02-15 PROCEDURE — 99233 SBSQ HOSP IP/OBS HIGH 50: CPT | Mod: GC

## 2023-02-15 PROCEDURE — 99232 SBSQ HOSP IP/OBS MODERATE 35: CPT

## 2023-02-15 RX ORDER — APIXABAN 2.5 MG/1
2.5 TABLET, FILM COATED ORAL EVERY 12 HOURS
Refills: 0 | Status: DISCONTINUED | OUTPATIENT
Start: 2023-02-15 | End: 2023-02-16

## 2023-02-15 RX ORDER — METOPROLOL TARTRATE 50 MG
25 TABLET ORAL ONCE
Refills: 0 | Status: COMPLETED | OUTPATIENT
Start: 2023-02-15 | End: 2023-02-15

## 2023-02-15 RX ORDER — METOPROLOL TARTRATE 50 MG
25 TABLET ORAL
Refills: 0 | Status: DISCONTINUED | OUTPATIENT
Start: 2023-02-15 | End: 2023-02-16

## 2023-02-15 RX ORDER — AMLODIPINE BESYLATE 2.5 MG/1
1 TABLET ORAL
Qty: 0 | Refills: 0 | DISCHARGE
Start: 2023-02-15

## 2023-02-15 RX ORDER — AMIODARONE HYDROCHLORIDE 400 MG/1
1 TABLET ORAL
Qty: 0 | Refills: 0 | DISCHARGE
Start: 2023-02-15

## 2023-02-15 RX ADMIN — HEPARIN SODIUM 600 UNIT(S)/HR: 5000 INJECTION INTRAVENOUS; SUBCUTANEOUS at 09:42

## 2023-02-15 RX ADMIN — HEPARIN SODIUM 800 UNIT(S)/HR: 5000 INJECTION INTRAVENOUS; SUBCUTANEOUS at 07:48

## 2023-02-15 RX ADMIN — HEPARIN SODIUM 800 UNIT(S)/HR: 5000 INJECTION INTRAVENOUS; SUBCUTANEOUS at 01:36

## 2023-02-15 RX ADMIN — AMIODARONE HYDROCHLORIDE 400 MILLIGRAM(S): 400 TABLET ORAL at 06:23

## 2023-02-15 RX ADMIN — ONDANSETRON 4 MILLIGRAM(S): 8 TABLET, FILM COATED ORAL at 23:57

## 2023-02-15 RX ADMIN — AMLODIPINE BESYLATE 5 MILLIGRAM(S): 2.5 TABLET ORAL at 06:19

## 2023-02-15 RX ADMIN — Medication 25 MILLIGRAM(S): at 11:22

## 2023-02-15 RX ADMIN — HEPARIN SODIUM 600 UNIT(S)/HR: 5000 INJECTION INTRAVENOUS; SUBCUTANEOUS at 16:53

## 2023-02-15 RX ADMIN — HEPARIN SODIUM 0 UNIT(S)/HR: 5000 INJECTION INTRAVENOUS; SUBCUTANEOUS at 08:33

## 2023-02-15 RX ADMIN — APIXABAN 2.5 MILLIGRAM(S): 2.5 TABLET, FILM COATED ORAL at 18:34

## 2023-02-15 NOTE — PROGRESS NOTE ADULT - SUBJECTIVE AND OBJECTIVE BOX
Patient is a 87y old  Female who presents with a chief complaint of Afib with RVR (14 Feb 2023 14:06)      INTERVAL HPI/OVERNIGHT EVENTS: Pt was seen and examined at bedside. No acute overnight events. Pt states that  Pt denies headache, dizziness, lightheadedness, fever, chills, body aches, CP, SOB, palpitations, abdominal pain, n/v, numbness/tingling.  No other complaints at this time.     MEDICATIONS  (STANDING):  aMIOdarone    Tablet   Oral   aMIOdarone    Tablet 200 milliGRAM(s) Oral daily  amLODIPine   Tablet 5 milliGRAM(s) Oral daily  heparin  Infusion.  Unit(s)/Hr (10 mL/Hr) IV Continuous <Continuous>  metoprolol tartrate 25 milliGRAM(s) Oral two times a day    MEDICATIONS  (PRN):  acetaminophen     Tablet .. 650 milliGRAM(s) Oral every 6 hours PRN Temp greater or equal to 38C (100.4F), Mild Pain (1 - 3)  heparin   Injectable 4000 Unit(s) IV Push every 6 hours PRN For aPTT less than 40  heparin   Injectable 2000 Unit(s) IV Push every 6 hours PRN For aPTT between 40 - 57  melatonin 3 milliGRAM(s) Oral at bedtime PRN Insomnia  ondansetron Injectable 4 milliGRAM(s) IV Push every 8 hours PRN Nausea and/or Vomiting      Allergies    Allergy Status Unknown  No Known Allergies    Intolerances        REVIEW OF SYSTEMS:  CONSTITUTIONAL: No fever or chills  HEENT:  No headache, no sore throat  RESPIRATORY: No cough, wheezing, or shortness of breath  CARDIOVASCULAR: No chest pain, palpitations  GASTROINTESTINAL: No abd pain, nausea, vomiting, or diarrhea  GENITOURINARY: No dysuria, frequency, or hematuria  NEUROLOGICAL: no focal weakness or dizziness  MUSCULOSKELETAL: no myalgias     Vital Signs Last 24 Hrs  T(C): 36.7 (15 Feb 2023 12:03), Max: 36.7 (14 Feb 2023 20:11)  T(F): 98.1 (15 Feb 2023 12:03), Max: 98.1 (14 Feb 2023 20:11)  HR: 62 (15 Feb 2023 12:03) (57 - 68)  BP: 158/71 (15 Feb 2023 12:03) (158/71 - 174/83)  BP(mean): --  RR: 18 (15 Feb 2023 12:03) (17 - 18)  SpO2: 98% (15 Feb 2023 12:03) (95% - 98%)    Parameters below as of 15 Feb 2023 12:03  Patient On (Oxygen Delivery Method): room air        PHYSICAL EXAM:  GENERAL: NAD  HEENT:  anicteric, moist mucous membranes  CHEST/LUNG:  CTA b/l, no rales, wheezes, or rhonchi  HEART:  RRR, S1, S2  ABDOMEN:  BS+, soft, nontender, nondistended  EXTREMITIES: no edema, cyanosis, or calf tenderness  NERVOUS SYSTEM: answers questions and follows commands appropriately    LABS:                        10.5   8.02  )-----------( 269      ( 15 Feb 2023 06:08 )             32.5     CBC Full  -  ( 15 Feb 2023 06:08 )  WBC Count : 8.02 K/uL  Hemoglobin : 10.5 g/dL  Hematocrit : 32.5 %  Platelet Count - Automated : 269 K/uL  Mean Cell Volume : 94.5 fl  Mean Cell Hemoglobin : 30.5 pg  Mean Cell Hemoglobin Concentration : 32.3 gm/dL  Auto Neutrophil # : 5.83 K/uL  Auto Lymphocyte # : 1.29 K/uL  Auto Monocyte # : 0.65 K/uL  Auto Eosinophil # : 0.11 K/uL  Auto Basophil # : 0.05 K/uL  Auto Neutrophil % : 72.7 %  Auto Lymphocyte % : 16.1 %  Auto Monocyte % : 8.1 %  Auto Eosinophil % : 1.4 %  Auto Basophil % : 0.6 %    15 Feb 2023 06:08    140    |  105    |  13     ----------------------------<  87     3.7     |  29     |  0.84     Ca    8.7        15 Feb 2023 06:08  Phos  2.7       15 Feb 2023 06:08  Mg     2.0       15 Feb 2023 06:08    TPro  6.9    /  Alb  2.8    /  TBili  0.3    /  DBili  x      /  AST  13     /  ALT  12     /  AlkPhos  70     15 Feb 2023 06:08    PTT - ( 15 Feb 2023 06:08 )  PTT:133.7 sec    CAPILLARY BLOOD GLUCOSE            Culture - Blood (collected 02-10-23 @ 12:40)  Source: .Blood Blood-Peripheral  Preliminary Report (02-11-23 @ 18:01):    No growth to date.    Culture - Blood (collected 02-10-23 @ 12:40)  Source: .Blood Blood-Peripheral  Preliminary Report (02-11-23 @ 18:01):    No growth to date.        RADIOLOGY & ADDITIONAL TESTS: ____    Personally reviewed.     Consultant(s) Notes Reviewed:  [x] YES  [ ] NO     Patient is a 87y old  Female who presents with a chief complaint of Afib with RVR (14 Feb 2023 14:06)      INTERVAL HPI/OVERNIGHT EVENTS: Pt was seen and examined at bedside. No acute overnight events. Pt states that she is doing well. Pt denies headache, dizziness, lightheadedness, fever, chills, body aches, CP, SOB, palpitations, abdominal pain, n/v, numbness/tingling. No other complaints at this time. Pt wants to go home. Pt currently on Metoprolol 25mg BID and amlodipine 5mg for consistently elevated BP. BP downtrending. Will monitor. Spoke to family. On board to transition pt to oral AC today. Will stop heparin gtt and start eliquis tonight.     MEDICATIONS  (STANDING):  aMIOdarone    Tablet   Oral   aMIOdarone    Tablet 200 milliGRAM(s) Oral daily  amLODIPine   Tablet 5 milliGRAM(s) Oral daily  heparin  Infusion.  Unit(s)/Hr (10 mL/Hr) IV Continuous <Continuous>  metoprolol tartrate 25 milliGRAM(s) Oral two times a day    MEDICATIONS  (PRN):  acetaminophen     Tablet .. 650 milliGRAM(s) Oral every 6 hours PRN Temp greater or equal to 38C (100.4F), Mild Pain (1 - 3)  heparin   Injectable 4000 Unit(s) IV Push every 6 hours PRN For aPTT less than 40  heparin   Injectable 2000 Unit(s) IV Push every 6 hours PRN For aPTT between 40 - 57  melatonin 3 milliGRAM(s) Oral at bedtime PRN Insomnia  ondansetron Injectable 4 milliGRAM(s) IV Push every 8 hours PRN Nausea and/or Vomiting      Allergies    Allergy Status Unknown  No Known Allergies    Intolerances        REVIEW OF SYSTEMS:  CONSTITUTIONAL: No fever or chills  HEENT:  No headache, no sore throat  RESPIRATORY: No cough, wheezing, or shortness of breath  CARDIOVASCULAR: No chest pain, palpitations  GASTROINTESTINAL: No abd pain, nausea, vomiting, or diarrhea  GENITOURINARY: No dysuria, frequency, or hematuria  NEUROLOGICAL: no focal weakness or dizziness  MUSCULOSKELETAL: no myalgias     Vital Signs Last 24 Hrs  T(C): 36.7 (15 Feb 2023 12:03), Max: 36.7 (14 Feb 2023 20:11)  T(F): 98.1 (15 Feb 2023 12:03), Max: 98.1 (14 Feb 2023 20:11)  HR: 62 (15 Feb 2023 12:03) (57 - 68)  BP: 158/71 (15 Feb 2023 12:03) (158/71 - 174/83)  BP(mean): --  RR: 18 (15 Feb 2023 12:03) (17 - 18)  SpO2: 98% (15 Feb 2023 12:03) (95% - 98%)    Parameters below as of 15 Feb 2023 12:03  Patient On (Oxygen Delivery Method): room air        PHYSICAL EXAM:  GENERAL: NAD  HEENT:  anicteric, moist mucous membranes  CHEST/LUNG:  CTA b/l, no rales, wheezes, or rhonchi  HEART:  RRR, S1, S2  ABDOMEN:  BS+, soft, nontender, nondistended  EXTREMITIES: no edema, cyanosis, or calf tenderness  NERVOUS SYSTEM: answers questions and follows commands appropriately    LABS:                        10.5   8.02  )-----------( 269      ( 15 Feb 2023 06:08 )             32.5     CBC Full  -  ( 15 Feb 2023 06:08 )  WBC Count : 8.02 K/uL  Hemoglobin : 10.5 g/dL  Hematocrit : 32.5 %  Platelet Count - Automated : 269 K/uL  Mean Cell Volume : 94.5 fl  Mean Cell Hemoglobin : 30.5 pg  Mean Cell Hemoglobin Concentration : 32.3 gm/dL  Auto Neutrophil # : 5.83 K/uL  Auto Lymphocyte # : 1.29 K/uL  Auto Monocyte # : 0.65 K/uL  Auto Eosinophil # : 0.11 K/uL  Auto Basophil # : 0.05 K/uL  Auto Neutrophil % : 72.7 %  Auto Lymphocyte % : 16.1 %  Auto Monocyte % : 8.1 %  Auto Eosinophil % : 1.4 %  Auto Basophil % : 0.6 %    15 Feb 2023 06:08    140    |  105    |  13     ----------------------------<  87     3.7     |  29     |  0.84     Ca    8.7        15 Feb 2023 06:08  Phos  2.7       15 Feb 2023 06:08  Mg     2.0       15 Feb 2023 06:08    TPro  6.9    /  Alb  2.8    /  TBili  0.3    /  DBili  x      /  AST  13     /  ALT  12     /  AlkPhos  70     15 Feb 2023 06:08    PTT - ( 15 Feb 2023 06:08 )  PTT:133.7 sec    CAPILLARY BLOOD GLUCOSE            Culture - Blood (collected 02-10-23 @ 12:40)  Source: .Blood Blood-Peripheral  Preliminary Report (02-11-23 @ 18:01):    No growth to date.    Culture - Blood (collected 02-10-23 @ 12:40)  Source: .Blood Blood-Peripheral  Preliminary Report (02-11-23 @ 18:01):    No growth to date.        RADIOLOGY & ADDITIONAL TESTS: None     Personally reviewed.     Consultant(s) Notes Reviewed:  [x] YES  [ ] NO

## 2023-02-15 NOTE — PROGRESS NOTE ADULT - PROBLEM SELECTOR PLAN 1
Pt presents with new onset Afib RVR up to 140s and hypotension  - EKG on admission: Afib with RVR 132bpm, PVCs  - initially held home Metoprolol 12.5 mg BID due to low blood pressures; pt missed dose at rehab due to hypotension. now restarted and pt is hypertensive   - Now on amiodarone    - Patient sons, Tomer and Joshua, who are the medical decision makers, initially refused blood thinner however given pt also with elevated troponin they agreed to starting hep gtt. continue for now. will need further discussion with family regarding A/c going forward. Son still to decide on longer term anticoagulation. Could not reach by phone today. Will reattempt contact again tomorrow.   - troponins trended up - now peaked. 2/2 demand ischemia in setting of rapid afib.   - TSH WNL  - Replete electrolytes as needed  - TTE 1/2023 showed normal LV & RV size and function EF 65%, mild AI, trace MR and TR, trace pericardial effusion.   - [id number] score: 4  - Monitor lytes and replete as needed  - Telemonitoring  - Cardiology Dr. Philip consulted, recs appreciated  - d/c planning for 2/15 if BP controlled. Pt presents with new onset Afib RVR up to 140s and hypotension  - EKG on admission: Afib with RVR 132bpm, PVCs   - Continue amiodarone    - Patient son Joshua and daughter in law Lucy on board to transition pt from heparin gtt to Eliquis tonight   - Troponins trended to peak; likely 2/2 to demand in relation to afib with RVR  - TSH WNL  - Replete electrolytes as needed  - TTE 1/2023 showed normal LV & RV size and function EF 65%, mild AI, trace MR and TR, trace pericardial effusion.   - [id number] score: 4  - Monitor lytes and replete as needed  - Telemonitoring  - Cardiology Dr. Philip consulted, recs appreciated  - d/c planning for 2/16 if BP controlled.

## 2023-02-15 NOTE — PROGRESS NOTE ADULT - SUBJECTIVE AND OBJECTIVE BOX
[facility] -- Jeremi Perkins, Cedrick Samson Savella, [place]  Office # [phone]      Follow Up:  Afib with RVR and HTN    Subjective/Observations: Seen and examined.  Sitting in bed resting comfortably with no new complaints.  Denies cp, sob or palpitations.  Tele reviewed,  NAD.  No events overnight.        REVIEW OF SYSTEMS: All other review of systems is negative unless indicated above    PAST MEDICAL & SURGICAL HISTORY:  Hypertension      Dementia      No significant past surgical history          MEDICATIONS  (STANDING):  aMIOdarone    Tablet   Oral   aMIOdarone    Tablet 200 milliGRAM(s) Oral daily  amLODIPine   Tablet 5 milliGRAM(s) Oral daily  apixaban 2.5 milliGRAM(s) Oral every 12 hours  heparin  Infusion.  Unit(s)/Hr (10 mL/Hr) IV Continuous <Continuous>  metoprolol tartrate 25 milliGRAM(s) Oral two times a day    MEDICATIONS  (PRN):  acetaminophen     Tablet .. 650 milliGRAM(s) Oral every 6 hours PRN Temp greater or equal to 38C (100.4F), Mild Pain (1 - 3)  heparin   Injectable 4000 Unit(s) IV Push every 6 hours PRN For aPTT less than 40  heparin   Injectable 2000 Unit(s) IV Push every 6 hours PRN For aPTT between 40 - 57  melatonin 3 milliGRAM(s) Oral at bedtime PRN Insomnia  ondansetron Injectable 4 milliGRAM(s) IV Push every 8 hours PRN Nausea and/or Vomiting      Allergies    Allergy Status Unknown  No Known Allergies    Intolerances            Vital Signs Last 24 Hrs  T(C): 36.7 (15 Feb 2023 12:03), Max: 36.7 (14 Feb 2023 20:11)  T(F): 98.1 (15 Feb 2023 12:03), Max: 98.1 (14 Feb 2023 20:11)  HR: 62 (15 Feb 2023 12:03) (57 - 68)  BP: 158/71 (15 Feb 2023 12:03) (158/71 - 174/83)  BP(mean): --  RR: 18 (15 Feb 2023 12:03) (17 - 18)  SpO2: 98% (15 Feb 2023 12:03) (98% - 98%)    Parameters below as of 15 Feb 2023 12:03  Patient On (Oxygen Delivery Method): room air        I&O's Summary    14 Feb 2023 07:01  -  15 Feb 2023 07:00  --------------------------------------------------------  IN: 0 mL / OUT: 300 mL / NET: -300 mL          PHYSICAL EXAM:  TELE: SB 50s  Constitutional: NAD, awake and alert, well-developed  HEENT: Moist Mucous Membranes, Anicteric  Pulmonary: Non-labored, breath sounds are clear bilaterally, No wheezing, rales or rhonchi  Cardiovascular: Regular, S1 and S2, No murmurs, rubs, gallops or clicks  Gastrointestinal: Bowel Sounds present, soft, nontender.   Lymph: mild peripheral edema. No lymphadenopathy.  Skin: No visible rashes or ulcers.  Psych:  Mood & affect appropriate    LABS: All Labs Reviewed:                        10.5   8.02  )-----------( 269      ( 15 Feb 2023 06:08 )             32.5                         10.3   7.87  )-----------( 282      ( 14 Feb 2023 06:55 )             32.4                         9.6    10.48 )-----------( 255      ( 14 Feb 2023 00:22 )             30.7     15 Feb 2023 06:08    140    |  105    |  13     ----------------------------<  87     3.7     |  29     |  0.84   14 Feb 2023 06:55    139    |  103    |  9      ----------------------------<  106    3.8     |  29     |  0.80   13 Feb 2023 07:20    140    |  106    |  10     ----------------------------<  96     3.8     |  27     |  0.80     Ca    8.7        15 Feb 2023 06:08  Ca    8.7        14 Feb 2023 06:55  Ca    8.6        13 Feb 2023 07:20  Phos  2.7       15 Feb 2023 06:08  Phos  3.1       13 Feb 2023 07:20  Mg     2.0       15 Feb 2023 06:08  Mg     2.0       13 Feb 2023 07:20    TPro  6.9    /  Alb  2.8    /  TBili  0.3    /  DBili  x      /  AST  13     /  ALT  12     /  AlkPhos  70     15 Feb 2023 06:08  TPro  7.0    /  Alb  2.9    /  TBili  0.4    /  DBili  x      /  AST  9      /  ALT  12     /  AlkPhos  72     14 Feb 2023 06:55    PTT - ( 15 Feb 2023 06:08 )  PTT:133.7 sec    Ventricular Rate 71 BPM    Atrial Rate 71 BPM    P-R Interval 158 ms    QRS Duration 84 ms    Q-T Interval 368 ms    QTC Calculation(Bazett) 399 ms    P Axis 63 degrees    R Axis -28 degrees    T Axis 27 degrees    Diagnosis Line Normal sinus rhythm  Nonspecific ST and T wave abnormality  Abnormal ECG  When compared with ECG of 11-FEB-2023 08:29, (Unconfirmed)  No significant change was found  Confirmed by ROMELIA ALONSO ([id number]) on 2/11/2023 2:09:48 PM    ACC: [id number] EXAM:  ECHO TTE WO CON COMP W DOPP                          PROCEDURE DATE:  01/17/2023          INTERPRETATION:  INDICATION: Abnormal EKG  Sonographer KL    Blood Pressure 136/81    Height 158 cm     Weight 57 kg       BSA 1.6 sq m    Dimensions:  LA 2.3       Normal Values: 2.0 - 4.0 cm  Ao 3.3        Normal Values: 2.0 - 3.8 cm  SEPTUM 1.2       Normal Values: 0.6 - 1.2 cm  PWT 1.1       Normal Values: 0.6 - 1.1 cm  LVIDd 4.0         Normal Values: 3.0 - 5.6 cm  LVIDs 2.4  Normal Values: 1.8 - 4.0 cm      OBSERVATIONS:  Mitral Valve: normal, trace physiologic MR.  Aortic Valve/Aorta: Sclerotic trileaflet aortic valve. Trace to mild AI  Tricuspid Valve: normal with trace TR.  Pulmonic Valve: Not well-visualized  Left Atrium: normal  Right Atrium: Not well-visualized  Left Ventricle: normal LV size and systolic function, estimated LVEF of   65%.  Right Ventricle: Grossly normal size and systolic function.  Pericardium: Trace pericardial effusion.      IMPRESSION:  Normal left ventricular internal dimensions and systolic function,   estimated LVEF of 65%.  Grossly normal RV size and systolic function.  Sclerotic trileaflet aortic valve, trace to mild AI.  Trace physiologic MR and TR.  Trace pericardial effusion.    --- End of Report ---            JELLY BINGHAM MD; Attending Cardiologist  This document has been electronically signed. Jan 18 2023 12:47PM    ACC: [id number] EXAM:  XR CHEST AP OR PA 1V   ORDERED BY: ILA DOLL     PROCEDURE DATE:  02/10/2023          INTERPRETATION:  INDICATION: Sepsis    Portable chest 11:25 AM    COMPARISON: 1/14/2023    Overlying EKG leads and wires.    FINDINGS:  Heart/Vascular: The heart size, mediastinum, hilum and aorta are within   normal limits for projection. Atherosclerotic changes.  Pulmonary: Midline trachea. The right lung is grossly clear.Left   costophrenic angle is blunted suggesting small pleural effusion.    Bones: There is no fracture.  Lines and catheter: None    Impression:    Left costophrenic angle is blunted suggesting small pleural effusion.    --- End of Report ---             JANE ST DO; Attending Radiologist  This document has been electronically signed. Feb 10 2023  2:27PM     ACC: [id number] EXAM:  MR SPINE LUMBAR   ORDERED BY: YOBANI APRIL     PROCEDURE DATE:  01/19/2023          INTERPRETATION:  MR LUMBAR SPINE    Clinical information: Back pain difficulty walking    ADDITIONAL CLINICAL INFORMATION: Not Applicable    A noncontrast MRI of the lumbar spine was performed.    TECHNIQUE:  In the sagittal plane T1, T2, and STIR imaging was performed.  In the   axial plane T1 and T2 weighted imaging was utilized. In the coronal plane   T2 weighted images were obtained.    COMPARISON:  Abdomen and pelvis CT of 1/16/2023    FINDINGS:  SPINAL COLUMN:  Straightening of the normal lumbar lordosis. No subluxation.  Narrowing of the disc spaces, endplate degenerative changes, and endplate   osteophytes throughout. Partial fusion of the L4-5 disc space on a   degenerative basis.  No prevertebral or paraspinal edema. No paraspinal collection.    DISC LEVELS:  T11-12: Diffuse disc osteophyte complex with facet and ligamentous   degenerative changes. Mild canal and moderate bilateral neural foramina   narrowing.  T12-L1. Small to moderate right paracentral disc extrusion with   associated inferior migration. Mild to moderate facet and ligamentous   degenerative changes. Mild canal and mild right greater than left neural   foramina narrowing.  L1-2: Mild to moderate diffuse disc bulge. Moderate facet and ligamentous   degenerative changes. Mild canal, moderate left and mild right neural   foramina narrowing.  L2-3: Small central/right paracentral disc protrusion superimposed on a   diffuse disc bulge. Moderate facet and ligamentous degenerative changes.   Moderate canal, moderate right and mild left neural foramina narrowing  L3-4: Moderate diffuse disc bulge. Severe facet and ligamentous   degenerative changes. Severe canal, severe right and mild left neural   foramina narrowing  L4-5: L4-5 disc space is partly fused on a degenerative basis. Moderate   disc osteophyte complex greatest in the central/left paracentral region.   Moderate facet and ligamentous degenerative changes. Moderate canal and   severe bilateral neural foramina narrowing  L5-S1: Mild diffuse disc bulge. Moderate facet and ligamentous   degenerative changes. Mild to moderate canal, severe left and moderate   right neural foramina narrowing.    CANAL CONTENTS:  The tip of the conus is at the L1-2 level. No compression of the distal   cord.  No epidural collection    OTHER:  The sacro-illiac joints appear intact.    IMPRESSION:  Moderate to severe multilevel degenerative changes lumbar spine greatest   at L3-4. Please see report for detail    --- End of Report ---            JANE LORENZO MD; Attending Radiologist  This document has been electronically signed. Jan 19 2023  4:30PM

## 2023-02-15 NOTE — PROGRESS NOTE ADULT - PROBLEM SELECTOR PLAN 2
RESOLVED    asymptomatic  +UA  culture grew E coli  completed 3 day course rocephin RESOLVED  asymptomatic  +UA  culture grew E coli  completed 3 day course rocephin

## 2023-02-15 NOTE — PROGRESS NOTE ADULT - ATTENDING COMMENTS
Agree with above, edited where appropriate.
87y Female with PMHx of HTN, dementia presenting with hypotension and found to be in new-onset Afib with RVR being admitted for rate control. Rate now controlled. BP poorly controlled, so Norvasc was added. Awaiting family decision on long term anticoagulation. Anticipate discharge tomorrow 2/15.
Agree  above, edited where appropriate.
87y Female with PMHx of HTN, dementia presenting with hypotension and found to be in new-onset Afib with RVR being admitted for rate control.    Patient seen and examined at bedside. States she is feeling well, AAOx3 on my exam. Denies any chest pain, SOB, abd pain, palpitations. States she is eager to go home. Discussed with family over phone, need to transition patient from heparin gtt to oral AC, family agreeable with starting eliquis. Will stop heparin gtt tonight and give 1st dose of eliquis, monitor Hgb in AM. Patient with better BP trend today, continue increased dose of metoprolol and continue norvasc. Monitor BP closely. D/C planning in AM.
Agree with above, edited where appropriate.

## 2023-02-15 NOTE — PROGRESS NOTE ADULT - PROBLEM SELECTOR PLAN 5
Chronic  BP has been elevated to systolic 170's 2/14  - metoprolol, dose increased to 25mg BID today for better bp control  - Amlodipine 5mg qd added 2/14 for better BP control  - monitor for hypotension   - Monitor routine hemodynamics Chronic  BP has been elevated to systolic 160s-70s  - continue metoprolol 25 mg BID   - continue amlodipine 5mg   - monitor for hypotension   - Monitor routine hemodynamics

## 2023-02-15 NOTE — PROGRESS NOTE ADULT - PROBLEM SELECTOR PLAN 7
- on hep gtt, ongoing discussion with family regarding AC - will transition from heparin gtt to Eliquis tonight

## 2023-02-15 NOTE — PROGRESS NOTE ADULT - PROBLEM SELECTOR PLAN 4
Likely anemia of chronic disease  - Hgb 9.9 on admission, was in the ranges of ~10s during last hospitalization in 01/2023  - Currently hemodynamically stable  - F/u iron panel: iron, ferritin, TIBC, transferrin  - Folate WNL and vitamin B12 elevated  - Trend CBC Likely anemia of chronic disease  - Hgb 9.9 on admission, was in the ranges of ~10s during last hospitalization in 01/2023  - Currently hemodynamically stable  - F/u iron panel  - Folate WNL and vitamin B12 elevated  - Trend CBC

## 2023-02-15 NOTE — CASE MANAGEMENT PROGRESS NOTE - NSCMPROGRESSNOTE_GEN_ALL_CORE
Discussed patient on rounds this morning and with MD. Per MD, patient is not medically cleared for discharge. Patient remains acute on a Heparin gt and systolic BP is in the 170's. CM spoke with the patient's son Indra [phone] and advised of the stated above. CM remains available for transition planning back to the [facility] with resumption of [facility] when medically cleared. Ambulance will be arranged for transportation.

## 2023-02-15 NOTE — PROGRESS NOTE ADULT - PROBLEM SELECTOR PROBLEM 1
New onset atrial fibrillation

## 2023-02-15 NOTE — PROGRESS NOTE ADULT - ASSESSMENT
87 F PMH HTN presents  from [facility] assisted living with low BP and tachycardia, found to be in A fib RVR. Patient was admitted recently here  after an unwitnessed fall, found to have E coli bacteremia and UTI on Jan 14, 23 and discharged 1/20/23. Cardiology was consulted A fib RVR and hypotension     New Onset A fib RVR, HTN  - Tele with A fib -140s converted to SR   - initially on Cardizem gtt,  - Continue Amio load 400mg q8h x 12 doses then 200mg daily  - TSH and LFTs normal.  Continue to monitor periodically while on Amiodarone    - BB increased  2/13 to 25 mg PO BID,   - Tele SB 50s  - CHADS VASc : 4   - continue hep gtt, , continue ac, family was previously reluctant   - Eliquis 2.5mg BID to be started this evening Heparin gtt to be stopped once DOAC started.     - EKG showed A fib RVR @ 132.  - No evidence of any active ischemia   - trops elev, peaked and now downtrending, likely in setting of AFib with RVR      Hypertension  - SBP elevated trending 150s-170s.  - Continue Metoprolol  - Pt on Amlodipine 5mg daily consider increasing for better BP control    - ECHO 01/2023 showed normal LV & RV size and function EF 65%, mild AI, trace MR and TR, trace pericardial effusion.   - No evidence of any meaningful volume overload  - Monitor and replete Lytes. Keep K > 4 and Mg > 2    CHELSEA Wood-BC  Cardiology NP  [facility] [phone]

## 2023-02-16 ENCOUNTER — TRANSCRIPTION ENCOUNTER (OUTPATIENT)
Age: 88
End: 2023-02-16

## 2023-02-16 VITALS
SYSTOLIC BLOOD PRESSURE: 147 MMHG | RESPIRATION RATE: 18 BRPM | HEART RATE: 59 BPM | DIASTOLIC BLOOD PRESSURE: 70 MMHG | OXYGEN SATURATION: 97 % | TEMPERATURE: 98 F

## 2023-02-16 LAB
ALBUMIN SERPL ELPH-MCNC: 3 G/DL — LOW (ref 3.3–5)
ALP SERPL-CCNC: 71 U/L — SIGNIFICANT CHANGE UP (ref 40–120)
ALT FLD-CCNC: 15 U/L — SIGNIFICANT CHANGE UP (ref 12–78)
ANION GAP SERPL CALC-SCNC: 5 MMOL/L — SIGNIFICANT CHANGE UP (ref 5–17)
APTT BLD: 29.5 SEC — SIGNIFICANT CHANGE UP (ref 27.5–35.5)
AST SERPL-CCNC: 16 U/L — SIGNIFICANT CHANGE UP (ref 15–37)
BASOPHILS # BLD AUTO: 0.03 K/UL — SIGNIFICANT CHANGE UP (ref 0–0.2)
BASOPHILS NFR BLD AUTO: 0.4 % — SIGNIFICANT CHANGE UP (ref 0–2)
BILIRUB SERPL-MCNC: 0.4 MG/DL — SIGNIFICANT CHANGE UP (ref 0.2–1.2)
BUN SERPL-MCNC: 13 MG/DL — SIGNIFICANT CHANGE UP (ref 7–23)
CALCIUM SERPL-MCNC: 8.9 MG/DL — SIGNIFICANT CHANGE UP (ref 8.5–10.1)
CHLORIDE SERPL-SCNC: 105 MMOL/L — SIGNIFICANT CHANGE UP (ref 96–108)
CO2 SERPL-SCNC: 29 MMOL/L — SIGNIFICANT CHANGE UP (ref 22–31)
CREAT SERPL-MCNC: 0.78 MG/DL — SIGNIFICANT CHANGE UP (ref 0.5–1.3)
EGFR: 74 ML/MIN/1.73M2 — SIGNIFICANT CHANGE UP
EOSINOPHIL # BLD AUTO: 0.11 K/UL — SIGNIFICANT CHANGE UP (ref 0–0.5)
EOSINOPHIL NFR BLD AUTO: 1.5 % — SIGNIFICANT CHANGE UP (ref 0–6)
GLUCOSE SERPL-MCNC: 86 MG/DL — SIGNIFICANT CHANGE UP (ref 70–99)
HCT VFR BLD CALC: 34.4 % — LOW (ref 34.5–45)
HGB BLD-MCNC: 10.8 G/DL — LOW (ref 11.5–15.5)
IMM GRANULOCYTES NFR BLD AUTO: 1.1 % — HIGH (ref 0–0.9)
LYMPHOCYTES # BLD AUTO: 0.93 K/UL — LOW (ref 1–3.3)
LYMPHOCYTES # BLD AUTO: 12.6 % — LOW (ref 13–44)
MAGNESIUM SERPL-MCNC: 2.2 MG/DL — SIGNIFICANT CHANGE UP (ref 1.6–2.6)
MCHC RBC-ENTMCNC: 29.7 PG — SIGNIFICANT CHANGE UP (ref 27–34)
MCHC RBC-ENTMCNC: 31.4 GM/DL — LOW (ref 32–36)
MCV RBC AUTO: 94.5 FL — SIGNIFICANT CHANGE UP (ref 80–100)
MONOCYTES # BLD AUTO: 0.49 K/UL — SIGNIFICANT CHANGE UP (ref 0–0.9)
MONOCYTES NFR BLD AUTO: 6.6 % — SIGNIFICANT CHANGE UP (ref 2–14)
NEUTROPHILS # BLD AUTO: 5.76 K/UL — SIGNIFICANT CHANGE UP (ref 1.8–7.4)
NEUTROPHILS NFR BLD AUTO: 77.8 % — HIGH (ref 43–77)
NRBC # BLD: 0 /100 WBCS — SIGNIFICANT CHANGE UP (ref 0–0)
PHOSPHATE SERPL-MCNC: 3.1 MG/DL — SIGNIFICANT CHANGE UP (ref 2.5–4.5)
PLATELET # BLD AUTO: 298 K/UL — SIGNIFICANT CHANGE UP (ref 150–400)
POTASSIUM SERPL-MCNC: 3.5 MMOL/L — SIGNIFICANT CHANGE UP (ref 3.5–5.3)
POTASSIUM SERPL-SCNC: 3.5 MMOL/L — SIGNIFICANT CHANGE UP (ref 3.5–5.3)
PROT SERPL-MCNC: 7.2 G/DL — SIGNIFICANT CHANGE UP (ref 6–8.3)
RBC # BLD: 3.64 M/UL — LOW (ref 3.8–5.2)
RBC # FLD: 15 % — HIGH (ref 10.3–14.5)
SODIUM SERPL-SCNC: 139 MMOL/L — SIGNIFICANT CHANGE UP (ref 135–145)
WBC # BLD: 7.4 K/UL — SIGNIFICANT CHANGE UP (ref 3.8–10.5)
WBC # FLD AUTO: 7.4 K/UL — SIGNIFICANT CHANGE UP (ref 3.8–10.5)

## 2023-02-16 PROCEDURE — 84443 ASSAY THYROID STIM HORMONE: CPT

## 2023-02-16 PROCEDURE — 85027 COMPLETE CBC AUTOMATED: CPT

## 2023-02-16 PROCEDURE — 84484 ASSAY OF TROPONIN QUANT: CPT

## 2023-02-16 PROCEDURE — 97530 THERAPEUTIC ACTIVITIES: CPT

## 2023-02-16 PROCEDURE — 85610 PROTHROMBIN TIME: CPT

## 2023-02-16 PROCEDURE — 85730 THROMBOPLASTIN TIME PARTIAL: CPT

## 2023-02-16 PROCEDURE — 82550 ASSAY OF CK (CPK): CPT

## 2023-02-16 PROCEDURE — 86901 BLOOD TYPING SEROLOGIC RH(D): CPT

## 2023-02-16 PROCEDURE — 97162 PT EVAL MOD COMPLEX 30 MIN: CPT

## 2023-02-16 PROCEDURE — 80048 BASIC METABOLIC PNL TOTAL CA: CPT

## 2023-02-16 PROCEDURE — 80053 COMPREHEN METABOLIC PANEL: CPT

## 2023-02-16 PROCEDURE — 97116 GAIT TRAINING THERAPY: CPT

## 2023-02-16 PROCEDURE — 36415 COLL VENOUS BLD VENIPUNCTURE: CPT

## 2023-02-16 PROCEDURE — 87637 SARSCOV2&INF A&B&RSV AMP PRB: CPT

## 2023-02-16 PROCEDURE — 99232 SBSQ HOSP IP/OBS MODERATE 35: CPT

## 2023-02-16 PROCEDURE — 96375 TX/PRO/DX INJ NEW DRUG ADDON: CPT

## 2023-02-16 PROCEDURE — 83735 ASSAY OF MAGNESIUM: CPT

## 2023-02-16 PROCEDURE — 84100 ASSAY OF PHOSPHORUS: CPT

## 2023-02-16 PROCEDURE — 86850 RBC ANTIBODY SCREEN: CPT

## 2023-02-16 PROCEDURE — 96374 THER/PROPH/DIAG INJ IV PUSH: CPT

## 2023-02-16 PROCEDURE — 87040 BLOOD CULTURE FOR BACTERIA: CPT

## 2023-02-16 PROCEDURE — 71045 X-RAY EXAM CHEST 1 VIEW: CPT

## 2023-02-16 PROCEDURE — 86900 BLOOD TYPING SEROLOGIC ABO: CPT

## 2023-02-16 PROCEDURE — 93005 ELECTROCARDIOGRAM TRACING: CPT

## 2023-02-16 PROCEDURE — 99239 HOSP IP/OBS DSCHRG MGMT >30: CPT

## 2023-02-16 PROCEDURE — 99285 EMERGENCY DEPT VISIT HI MDM: CPT

## 2023-02-16 PROCEDURE — 96361 HYDRATE IV INFUSION ADD-ON: CPT

## 2023-02-16 PROCEDURE — 82553 CREATINE MB FRACTION: CPT

## 2023-02-16 PROCEDURE — 83605 ASSAY OF LACTIC ACID: CPT

## 2023-02-16 PROCEDURE — 81001 URINALYSIS AUTO W/SCOPE: CPT

## 2023-02-16 PROCEDURE — 85025 COMPLETE CBC W/AUTO DIFF WBC: CPT

## 2023-02-16 PROCEDURE — 87635 SARS-COV-2 COVID-19 AMP PRB: CPT

## 2023-02-16 PROCEDURE — 96372 THER/PROPH/DIAG INJ SC/IM: CPT | Mod: XU

## 2023-02-16 RX ORDER — POTASSIUM CHLORIDE 20 MEQ
40 PACKET (EA) ORAL ONCE
Refills: 0 | Status: COMPLETED | OUTPATIENT
Start: 2023-02-16 | End: 2023-02-16

## 2023-02-16 RX ORDER — APIXABAN 2.5 MG/1
1 TABLET, FILM COATED ORAL
Qty: 60 | Refills: 0
Start: 2023-02-16 | End: 2023-03-17

## 2023-02-16 RX ORDER — METOPROLOL TARTRATE 50 MG
1 TABLET ORAL
Qty: 60 | Refills: 0
Start: 2023-02-16 | End: 2023-03-17

## 2023-02-16 RX ORDER — AMIODARONE HYDROCHLORIDE 400 MG/1
1 TABLET ORAL
Qty: 30 | Refills: 0
Start: 2023-02-16 | End: 2023-03-17

## 2023-02-16 RX ORDER — AMLODIPINE BESYLATE 2.5 MG/1
5 TABLET ORAL DAILY
Refills: 0 | Status: DISCONTINUED | OUTPATIENT
Start: 2023-02-16 | End: 2023-02-16

## 2023-02-16 RX ORDER — AMLODIPINE BESYLATE 2.5 MG/1
1 TABLET ORAL
Qty: 30 | Refills: 0
Start: 2023-02-16 | End: 2023-03-17

## 2023-02-16 RX ADMIN — AMLODIPINE BESYLATE 5 MILLIGRAM(S): 2.5 TABLET ORAL at 05:12

## 2023-02-16 RX ADMIN — Medication 40 MILLIEQUIVALENT(S): at 14:17

## 2023-02-16 RX ADMIN — AMIODARONE HYDROCHLORIDE 200 MILLIGRAM(S): 400 TABLET ORAL at 05:11

## 2023-02-16 RX ADMIN — Medication 25 MILLIGRAM(S): at 05:11

## 2023-02-16 RX ADMIN — APIXABAN 2.5 MILLIGRAM(S): 2.5 TABLET, FILM COATED ORAL at 05:12

## 2023-02-16 NOTE — PROGRESS NOTE ADULT - NS ATTEND AMEND GEN_ALL_CORE FT
Transitioning heparin gtt to Eliquis.  To follow while admitted.
cont amio load. cont bb. ac for now. fu whether family wants to cont. Further cardiac workup will depend on clinical course.
HR controlled. No signs of significant ischemia or volume overload. cont current care. Further cardiac workup will depend on clinical course.
I have personally seen and examined the patient in detail.  I have spoken to the provider regarding the assessment and plan of care.  I have made changes to the note accordingly.
now in SR. stop cardizem gtt. cont bb. start Amio load. monitor lfts  CHADS Vasc high. cont ac. though family was previously reluctant.   elevated trops likely from demand. now downtrended.   Further cardiac workup will depend on clinical course.
paf back in sr  on heparin gtt  hx of falls, is elevated risk for long term ac  family needs to be in agreement, noting their prev desire to avoid long term ac

## 2023-02-16 NOTE — PROGRESS NOTE ADULT - PROVIDER SPECIALTY LIST ADULT
Cardiology
Hospitalist
Hospitalist
Cardiology
Cardiology
Hospitalist

## 2023-02-16 NOTE — DISCHARGE NOTE NURSING/CASE MANAGEMENT/SOCIAL WORK - PATIENT PORTAL LINK FT
You can access the FollowMyHealth Patient Portal offered by [facility] by registering at the following website: [email or web address]. By joining "[employer]"’s FollowMyHealth portal, you will also be able to view your health information using other applications (apps) compatible with our system.

## 2023-02-16 NOTE — CASE MANAGEMENT PROGRESS NOTE - NSCMPROGRESSNOTE_GEN_ALL_CORE
Discussed patient on rounds this morning and with MD. Per MD, patient is medically cleared for transition back to [facility] today. CM spoke with Jessica at [facility] [phone] and advised of anticipated discharge for today at 2pm. 3122 form and repeat COVID-19 negative PCR results have been faxed to [facility] F# [phone]. Referral to [facility] for resumption of services for a visiting nurse/PT for SOC 2/17/23 has been accepted. CM met with the patient at the bedside and made contact with the patient's son Indra [phone] to discuss the transition plan as stated above. Patient and Indra verbalized understanding of the transition plan and are in agreement. AMY verified with Renata at [facility] that they have received the e-prescriptions. Bedside RN aware of discharge plan. CM remains available.

## 2023-02-16 NOTE — PROGRESS NOTE ADULT - REASON FOR ADMISSION
Afib with RVR

## 2023-02-16 NOTE — DISCHARGE NOTE NURSING/CASE MANAGEMENT/SOCIAL WORK - NSDCCRTYPESERV_GEN_ALL_CORE_FT
An ambulance has been arranged to transport you to [facility] today

## 2023-02-16 NOTE — PROGRESS NOTE ADULT - SUBJECTIVE AND OBJECTIVE BOX
[facility] -- Maddie Arevalo, Jeremi, Cedrick Samson Savella, Goodger  Office # [phone]    Follow Up:      Subjective/Observations:     REVIEW OF SYSTEMS: All other review of systems is negative unless indicated above  PAST MEDICAL & SURGICAL HISTORY:  Hypertension      Dementia      No significant past surgical history        MEDICATIONS  (STANDING):  aMIOdarone    Tablet   Oral   aMIOdarone    Tablet 200 milliGRAM(s) Oral daily  amLODIPine   Tablet 5 milliGRAM(s) Oral daily  apixaban 2.5 milliGRAM(s) Oral every 12 hours  metoprolol tartrate 25 milliGRAM(s) Oral two times a day  potassium chloride   Powder 40 milliEquivalent(s) Oral once    MEDICATIONS  (PRN):  acetaminophen     Tablet .. 650 milliGRAM(s) Oral every 6 hours PRN Temp greater or equal to 38C (100.4F), Mild Pain (1 - 3)  melatonin 3 milliGRAM(s) Oral at bedtime PRN Insomnia  ondansetron Injectable 4 milliGRAM(s) IV Push every 8 hours PRN Nausea and/or Vomiting    Allergies    Allergy Status Unknown  No Known Allergies    Intolerances      Vital Signs Last 24 Hrs  T(C): 36.3 (16 Feb 2023 04:52), Max: 36.7 (15 Feb 2023 12:03)  T(F): 97.4 (16 Feb 2023 04:52), Max: 98.1 (15 Feb 2023 12:03)  HR: 62 (16 Feb 2023 04:52) (62 - 68)  BP: 154/81 (16 Feb 2023 04:52) (154/81 - 167/72)  BP(mean): --  RR: 18 (16 Feb 2023 04:52) (18 - 18)  SpO2: 95% (16 Feb 2023 04:52) (95% - 98%)    Parameters below as of 16 Feb 2023 04:52  Patient On (Oxygen Delivery Method): room air      I&O's Summary      PHYSICAL EXAM:  TELE:   Constitutional: NAD, awake and alert, well-developed  HEENT: Moist Mucous Membranes, Anicteric  Pulmonary: Non-labored, breath sounds are clear bilaterally, No wheezing, rales or rhonchi  Cardiovascular: Regular, S1 and S2, No murmurs, rubs, gallops or clicks  Gastrointestinal: Bowel Sounds present, soft, nontender.   Lymph: No peripheral edema. No lymphadenopathy.  Skin: No visible rashes or ulcers.  Psych:  Mood & affect appropriate  LABS: All Labs Reviewed:                        10.8   7.40  )-----------( 298      ( 16 Feb 2023 06:18 )             34.4                         10.5   8.02  )-----------( 269      ( 15 Feb 2023 06:08 )             32.5                         10.3   7.87  )-----------( 282      ( 14 Feb 2023 06:55 )             32.4     16 Feb 2023 06:18    139    |  105    |  13     ----------------------------<  86     3.5     |  29     |  0.78   15 Feb 2023 06:08    140    |  105    |  13     ----------------------------<  87     3.7     |  29     |  0.84   14 Feb 2023 06:55    139    |  103    |  9      ----------------------------<  106    3.8     |  29     |  0.80     Ca    8.9        16 Feb 2023 06:18  Ca    8.7        15 Feb 2023 06:08  Ca    8.7        14 Feb 2023 06:55  Phos  3.1       16 Feb 2023 06:18  Phos  2.7       15 Feb 2023 06:08  Mg     2.2       16 Feb 2023 06:18  Mg     2.0       15 Feb 2023 06:08    TPro  7.2    /  Alb  3.0    /  TBili  0.4    /  DBili  x      /  AST  16     /  ALT  15     /  AlkPhos  71     16 Feb 2023 06:18  TPro  6.9    /  Alb  2.8    /  TBili  0.3    /  DBili  x      /  AST  13     /  ALT  12     /  AlkPhos  70     15 Feb 2023 06:08  TPro  7.0    /  Alb  2.9    /  TBili  0.4    /  DBili  x      /  AST  9      /  ALT  12     /  AlkPhos  72     14 Feb 2023 06:55    PTT - ( 15 Feb 2023 23:39 )  PTT:29.5 sec         Sierra Sales DNP, AGACNP-C  Cardiology   Spectra #[phone]      [facility] -- Maddie Arevalo, Jeremi, Cedrick Samson Savella, Goodger  Office # [phone]    Follow Up:  New Onset Afib    Subjective/Observations: Awake and alert, comfortable on RA.  Denies any form of respiratory or cardiac discomfort.  No further Afib on tele    REVIEW OF SYSTEMS: All other review of systems is negative unless indicated above  PAST MEDICAL & SURGICAL HISTORY:  Hypertension  Dementia  No significant past surgical history    MEDICATIONS  (STANDING):  aMIOdarone    Tablet   Oral   aMIOdarone    Tablet 200 milliGRAM(s) Oral daily  amLODIPine   Tablet 5 milliGRAM(s) Oral daily  apixaban 2.5 milliGRAM(s) Oral every 12 hours  metoprolol tartrate 25 milliGRAM(s) Oral two times a day  potassium chloride   Powder 40 milliEquivalent(s) Oral once    MEDICATIONS  (PRN):  acetaminophen     Tablet .. 650 milliGRAM(s) Oral every 6 hours PRN Temp greater or equal to 38C (100.4F), Mild Pain (1 - 3)  melatonin 3 milliGRAM(s) Oral at bedtime PRN Insomnia  ondansetron Injectable 4 milliGRAM(s) IV Push every 8 hours PRN Nausea and/or Vomiting    Allergies    Allergy Status Unknown  No Known Allergies    Intolerances    Vital Signs Last 24 Hrs  T(C): 36.3 (16 Feb 2023 04:52), Max: 36.7 (15 Feb 2023 12:03)  T(F): 97.4 (16 Feb 2023 04:52), Max: 98.1 (15 Feb 2023 12:03)  HR: 62 (16 Feb 2023 04:52) (62 - 68)  BP: 154/81 (16 Feb 2023 04:52) (154/81 - 167/72)  BP(mean): --  RR: 18 (16 Feb 2023 04:52) (18 - 18)  SpO2: 95% (16 Feb 2023 04:52) (95% - 98%)    Parameters below as of 16 Feb 2023 04:52  Patient On (Oxygen Delivery Method): room air    I&O's Summary      PHYSICAL EXAM:  TELE: NSR  Constitutional: NAD, awake and alert, well-developed  HEENT: Moist Mucous Membranes, Anicteric  Pulmonary: Non-labored, breath sounds are clear bilaterally, No wheezing, rales or rhonchi  Cardiovascular: Regular, S1 and S2, No murmurs, rubs, gallops or clicks  Gastrointestinal: Bowel Sounds present, soft, nontender.   Lymph: No peripheral edema. No lymphadenopathy.  Skin: No visible rashes or ulcers.  Psych:  Mood & affect appropriate  LABS: All Labs Reviewed:                        10.8   7.40  )-----------( 298      ( 16 Feb 2023 06:18 )             34.4                         10.5   8.02  )-----------( 269      ( 15 Feb 2023 06:08 )             32.5                         10.3   7.87  )-----------( 282      ( 14 Feb 2023 06:55 )             32.4     16 Feb 2023 06:18    139    |  105    |  13     ----------------------------<  86     3.5     |  29     |  0.78   15 Feb 2023 06:08    140    |  105    |  13     ----------------------------<  87     3.7     |  29     |  0.84   14 Feb 2023 06:55    139    |  103    |  9      ----------------------------<  106    3.8     |  29     |  0.80     Ca    8.9        16 Feb 2023 06:18  Ca    8.7        15 Feb 2023 06:08  Ca    8.7        14 Feb 2023 06:55  Phos  3.1       16 Feb 2023 06:18  Phos  2.7       15 Feb 2023 06:08  Mg     2.2       16 Feb 2023 06:18  Mg     2.0       15 Feb 2023 06:08    TPro  7.2    /  Alb  3.0    /  TBili  0.4    /  DBili  x      /  AST  16     /  ALT  15     /  AlkPhos  71     16 Feb 2023 06:18  TPro  6.9    /  Alb  2.8    /  TBili  0.3    /  DBili  x      /  AST  13     /  ALT  12     /  AlkPhos  70     15 Feb 2023 06:08  TPro  7.0    /  Alb  2.9    /  TBili  0.4    /  DBili  x      /  AST  9      /  ALT  12     /  AlkPhos  72     14 Feb 2023 06:55    PTT - ( 15 Feb 2023 23:39 )  PTT:29.5 sec      ACC: [id number] EXAM:  ECHO TTE WO CON COMP W DOPP                          PROCEDURE DATE:  01/17/2023          INTERPRETATION:  INDICATION: Abnormal EKG  Sonographer KL    Blood Pressure 136/81    Height 158 cm     Weight 57 kg       BSA 1.6 sq m    Dimensions:  LA 2.3       Normal Values: 2.0 - 4.0 cm  Ao 3.3        Normal Values: 2.0 - 3.8 cm  SEPTUM 1.2       Normal Values: 0.6 - 1.2 cm  PWT 1.1       Normal Values: 0.6 - 1.1 cm  LVIDd 4.0         Normal Values: 3.0 - 5.6 cm  LVIDs 2.4  Normal Values: 1.8 - 4.0 cm      OBSERVATIONS:  Mitral Valve: normal, trace physiologic MR.  Aortic Valve/Aorta: Sclerotic trileaflet aortic valve. Trace to mild AI  Tricuspid Valve: normal with trace TR.  Pulmonic Valve: Not well-visualized  Left Atrium: normal  Right Atrium: Not well-visualized  Left Ventricle: normal LV size and systolic function, estimated LVEF of   65%.  Right Ventricle: Grossly normal size and systolic function.  Pericardium: Trace pericardial effusion.    IMPRESSION:  Normal left ventricular internal dimensions and systolic function,   estimated LVEF of 65%.  Grossly normal RV size and systolic function.  Sclerotic trileaflet aortic valve, trace to mild AI.  Trace physiologic MR and TR.  Trace pericardial effusion.    --- End of Report ---    JELLY BINGHAM MD; Attending Cardiologist  This document has been electronically signed. Jan 18 2023 12:47PM    ACC: [id number] EXAM:  XR CHEST AP OR PA 1V   ORDERED BY: LIA DOLL     PROCEDURE DATE:  02/10/2023      INTERPRETATION:  INDICATION: Sepsis    Portable chest 11:25 AM    COMPARISON: 1/14/2023    Overlying EKG leads and wires.    FINDINGS:  Heart/Vascular: The heart size, mediastinum, hilum and aorta are within   normal limits for projection. Atherosclerotic changes.  Pulmonary: Midline trachea. The right lung is grossly clear.Left   costophrenic angle is blunted suggesting small pleural effusion.    Bones: There is no fracture.  Lines and catheter: None    Impression:    Left costophrenic angle is blunted suggesting small pleural effusion.    --- End of Report ---     JANE ST DO; Attending Radiologist  This document has been electronically signed. Feb 10 2023  2:27PM    Ventricular Rate 71 BPM    Atrial Rate 71 BPM    P-R Interval 158 ms    QRS Duration 84 ms    Q-T Interval 368 ms    QTC Calculation(Bazett) 399 ms    P Axis 63 degrees    R Axis -28 degrees    T Axis 27 degrees    Diagnosis Line Normal sinus rhythm  Nonspecific ST and T wave abnormality  Abnormal ECG  When compared with ECG of 11-FEB-2023 08:29, (Unconfirmed)  No significant change was found  Confirmed by ROMELIA ALONSO ([id number]) on 2/11/2023 2:09:48 PM

## 2023-02-16 NOTE — PROGRESS NOTE ADULT - SUBJECTIVE AND OBJECTIVE BOX
Patient seen and examined at bedside. Patient states she feels well, denies any chest pain, SOB, abd pain. Patient's BP improved, plan for discharge back to Assisted Living with home care today.    Physical Exam:  GENERAL: NAD, laying in bed comfortably  HEENT:  anicteric, moist mucous membranes  CHEST/LUNG:  CTA b/l, no rales, wheezes, or rhonchi  HEART:  RRR, S1, S2  ABDOMEN:  BS+, soft, nontender, nondistended  EXTREMITIES: no edema, cyanosis, or calf tenderness  NERVOUS SYSTEM: answers questions and follows commands appropriately    Please refer to updated D/C note for further details.

## 2023-02-16 NOTE — PROGRESS NOTE ADULT - ASSESSMENT
87 F PMH HTN presents  from [facility] assisted living with low BP and tachycardia, found to be in A fib RVR. Patient was admitted recently here  after an unwitnessed fall, found to have E coli bacteremia and UTI on Jan 14, 23 and discharged 1/20/23. Cardiology was consulted A fib RVR and hypotension     New Onset A fib, HTN  - Tele with A fib -140s; converted to SR.  No further Afib noted.  Can D/C tele  - Continue Amio load 400mg q8h x 12 doses (total of 5Gm) then 200mg daily  - Continue BB.  Can switch to long-acting in am, 2/17  - TSH and LFTs normal.    - Continue low dose Eliquis  - EKG showed A fib RVR @ 132.  - No evidence of any active ischemia     Hypertension  - BP range 140-150.  Continue Norvasc 5 mg daily with parameter  - ECHO 01/2023 showed normal LV & RV size and function EF 65%, mild AI, trace MR and TR, trace pericardial effusion.   - No evidence of any meaningful volume overload    - Monitor and replete Lytes. Keep K > 4 and Mg > 2  - Will continue to follow    Sierra Sales DNP, NP-C, AGACNP-C  Cardiology   Spectra #[phone]

## 2023-02-16 NOTE — DISCHARGE NOTE NURSING/CASE MANAGEMENT/SOCIAL WORK - NSDCVIVACCINE_GEN_ALL_CORE_FT
Tdap; 24-Mar-2019 16:36; Yara Rowe (HIRO); MashON; [id number] (Exp. Date: 26-Feb-2021); IntraMuscular; Deltoid Left.; 0.5 milliLiter(s); VIS (VIS Published: 09-May-2013, VIS Presented: 24-Mar-2019);

## 2023-02-20 ENCOUNTER — EMERGENCY (EMERGENCY)
Facility: HOSPITAL | Age: 88
LOS: 1 days | Discharge: HOME | End: 2023-02-20
Attending: STUDENT IN AN ORGANIZED HEALTH CARE EDUCATION/TRAINING PROGRAM | Admitting: STUDENT IN AN ORGANIZED HEALTH CARE EDUCATION/TRAINING PROGRAM
Payer: MEDICARE

## 2023-02-20 VITALS
RESPIRATION RATE: 18 BRPM | DIASTOLIC BLOOD PRESSURE: 79 MMHG | SYSTOLIC BLOOD PRESSURE: 179 MMHG | HEART RATE: 63 BPM | TEMPERATURE: 98 F | OXYGEN SATURATION: 98 %

## 2023-02-20 VITALS
HEIGHT: 62 IN | HEART RATE: 58 BPM | OXYGEN SATURATION: 98 % | SYSTOLIC BLOOD PRESSURE: 149 MMHG | TEMPERATURE: 98 F | RESPIRATION RATE: 16 BRPM | DIASTOLIC BLOOD PRESSURE: 80 MMHG

## 2023-02-20 PROCEDURE — 99284 EMERGENCY DEPT VISIT MOD MDM: CPT | Mod: 25

## 2023-02-20 PROCEDURE — 72125 CT NECK SPINE W/O DYE: CPT | Mod: 26,MA

## 2023-02-20 PROCEDURE — 70450 CT HEAD/BRAIN W/O DYE: CPT | Mod: MA

## 2023-02-20 PROCEDURE — 72125 CT NECK SPINE W/O DYE: CPT | Mod: MA

## 2023-02-20 PROCEDURE — 70450 CT HEAD/BRAIN W/O DYE: CPT | Mod: 26,MA

## 2023-02-20 PROCEDURE — 73080 X-RAY EXAM OF ELBOW: CPT | Mod: 26,LT

## 2023-02-20 PROCEDURE — 90715 TDAP VACCINE 7 YRS/> IM: CPT

## 2023-02-20 PROCEDURE — 90471 IMMUNIZATION ADMIN: CPT

## 2023-02-20 PROCEDURE — 99285 EMERGENCY DEPT VISIT HI MDM: CPT | Mod: FS

## 2023-02-20 PROCEDURE — 73080 X-RAY EXAM OF ELBOW: CPT

## 2023-02-20 RX ORDER — TETANUS TOXOID, REDUCED DIPHTHERIA TOXOID AND ACELLULAR PERTUSSIS VACCINE, ADSORBED 5; 2.5; 8; 8; 2.5 [IU]/.5ML; [IU]/.5ML; UG/.5ML; UG/.5ML; UG/.5ML
0.5 SUSPENSION INTRAMUSCULAR ONCE
Refills: 0 | Status: COMPLETED | OUTPATIENT
Start: 2023-02-20 | End: 2023-02-20

## 2023-02-20 RX ADMIN — TETANUS TOXOID, REDUCED DIPHTHERIA TOXOID AND ACELLULAR PERTUSSIS VACCINE, ADSORBED 0.5 MILLILITER(S): 5; 2.5; 8; 8; 2.5 SUSPENSION INTRAMUSCULAR at 14:39

## 2023-02-20 NOTE — ED PROVIDER NOTE - PROGRESS NOTE DETAILS
spoke with son, refusing sutures or repair to elbow laceration. states "there will be too much confusion when she returns back to assisted living". discussed laceration will heal better with repair, and son continues to refuse. laceration was cleaned in ED and bandage applied. CT of head and x-ray pending. tetanus updated Patient stable.  CAT scan and x-ray results discussed with patient.  No evidence of intracranial hemorrhage or skull fracture.  No obvious fracture on elbow x-ray.  Discussed possibility of small missed occult fracture.  Patient has full range of motion, low clinical suspicion for fracture.  Wound care and head injury instructions discussed.  Return precautions explained.  Spoke with patient's son, Indra and is comfortable with plan.

## 2023-02-20 NOTE — ED PROVIDER NOTE - PATIENT PORTAL LINK FT
You can access the FollowMyHealth Patient Portal offered by [facility] by registering at the following website: [email or web address]. By joining [employer]’s FollowMyHealth portal, you will also be able to view your health information using other applications (apps) compatible with our system.

## 2023-02-20 NOTE — ED ADULT NURSE NOTE - NSIMPLEMENTINTERV_GEN_ALL_ED
Implemented All Fall Risk Interventions:  [place] to call system. Call bell, personal items and telephone within reach. Instruct patient to call for assistance. Room bathroom lighting operational. Non-slip footwear when patient is off stretcher. Physically safe environment: no spills, clutter or unnecessary equipment. Stretcher in lowest position, wheels locked, appropriate side rails in place. Provide visual cue, wrist band, yellow gown, etc. Monitor gait and stability. Monitor for mental status changes and reorient to person, place, and time. Review medications for side effects contributing to fall risk. Reinforce activity limits and safety measures with patient and family.

## 2023-02-20 NOTE — ED PROVIDER NOTE - NSFOLLOWUPINSTRUCTIONS_ED_ALL_ED_FT
Tylenol over-the-counter for pain and discomfort as needed  Ice  Apply bacitracin or triple antibiotic ointment to left elbow twice a day  Follow-up with primary care doctor      Head Injury    WHAT YOU NEED TO KNOW:    A head injury can include your scalp, face, skull, or brain and range from mild to severe. Effects can appear immediately after the injury or develop later. The effects may last a short time or be permanent. Healthcare providers may want to check your recovery over time. Treatment may change as you recover or develop new health problems from the head injury.    DISCHARGE INSTRUCTIONS:    Call your local emergency number (911 in the ), or have someone else call if:   •You cannot be woken.      •You have a seizure.      •You stop responding to others or you faint.      •You have blurry or double vision.      •Your speech becomes slurred or confused.      •You have arm or leg weakness, loss of feeling, or new problems with coordination.      •Your pupils are larger than usual, or one pupil is a different size than the other.      •You have blood or clear fluid coming out of your ears or nose.      Return to the emergency department if:   •You have repeated or forceful vomiting.      •You feel confused.      •Your headache gets worse or becomes severe.      •You or someone caring for you notices that you are harder to wake than usual.      Call your doctor if:   •Your symptoms last longer than 6 weeks after the injury.      •You have questions or concerns about your condition or care.      Medicines:   •Acetaminophen decreases pain and fever. It is available without a doctor's order. Ask how much to take and how often to take it. Follow directions. Read the labels of all other medicines you are using to see if they also contain acetaminophen, or ask your doctor or pharmacist. Acetaminophen can cause liver damage if not taken correctly.      •Take your medicine as directed. Contact your healthcare provider if you think your medicine is not helping or if you have side effects. Tell your provider if you are allergic to any medicine. Keep a list of the medicines, vitamins, and herbs you take. Include the amounts, and when and why you take them. Bring the list or the pill bottles to follow-up visits. Carry your medicine list with you in case of an emergency.      Self-care:   •Rest or do quiet activities. Limit your time watching TV, using the computer, or doing tasks that require a lot of thinking. Slowly return to your normal activities as directed. Do not play sports or do activities that may cause you to get hit in the head. Ask your healthcare provider when you can return to sports.      •Apply ice on your head for 15 to 20 minutes every hour or as directed. Use an ice pack, or put crushed ice in a plastic bag. Cover it with a towel before you apply it to your skin. Ice helps prevent tissue damage and decreases swelling and pain.      •Have someone stay with you for 24 hours , or as directed. This person can monitor you for problems and call for help if needed. When you are awake, the person should ask you a few questions every few hours to see if you are thinking clearly. An example is to ask your name or address.      Prevent another head injury:   •Wear a helmet that fits properly. Do this when you play sports, or ride a bike, scooter, or skateboard. Helmets help decrease your risk for a serious head injury. Talk to your healthcare provider about other ways you can protect yourself if you play sports.      •Wear your seatbelt every time you are in a car. This helps lower your risk for a head injury if you are in a car accident.      Follow up with your doctor as directed: Write down your questions so you remember to ask them during your visits.

## 2023-02-20 NOTE — ED ADULT NURSE NOTE - NS TRANSFER DISPOSITION PATIENT BELONGINGS
Stereotactic biopsy completed. Biopsy site to right outer breast with an approximate 8.0 cm x 8.0 cm light bruise present. Dermabond dry and intact. Area under bruise with firmness noted around biopsy site. Denies pain. Ice pack with protective covering applied to biopsy site. To Ultrasound Dept. For biopsy on left breast.   with patient

## 2023-02-20 NOTE — ED PROVIDER NOTE - CARE PROVIDER_API CALL
Rivas Matthews)  [facility]  [address] [place] [address]  Phone: [phone]  Fax: [phone]  Follow Up Time: 1-3 Days

## 2023-02-20 NOTE — ED PROVIDER NOTE - MUSCULOSKELETAL, MLM
no muscle or joint tenderness. no vert tenderness or step off deformities. full ROM of all ext. no deformities

## 2023-02-20 NOTE — ED PROVIDER NOTE - CARE PLAN
1 Principal Discharge DX:	Head injury  Secondary Diagnosis:	Fall  Secondary Diagnosis:	Laceration of elbow, left

## 2023-02-20 NOTE — ED PROVIDER NOTE - CLINICAL SUMMARY MEDICAL DECISION MAKING FREE TEXT BOX
87-year-old female with recent diagnosis of A-fib now on Eliquis hypertension presents with fall patient states she had a slip and fall in the bathroom and hit the back of her head.  Denies loss of consciousness nausea vomiting or headache.  Complains of left elbow pain and laceration.  Denies neck pain or back pain patient well-appearing no neck or back tenderness neuro grossly intact laceration about 1 cm noted to left elbow.  Elbow shoulders bilaterally with full range of motion hips with full range of motion no bony tenderness Will check CT head and neck x-rays elbow laceration repair and with if within normal limits will DC

## 2023-02-20 NOTE — ED PROVIDER NOTE - DIFFERENTIAL DIAGNOSIS
Differentials include but not limited to intracranial hemorrhage, skull fracture, concussion, contusion, sprain, strain Differential Diagnosis

## 2023-02-20 NOTE — ED ADULT TRIAGE NOTE - CHIEF COMPLAINT QUOTE
Patient is from assisted living for tripped and fell. Denies LOC. Hit the head. Patient is on Eliquis.

## 2023-02-20 NOTE — ED ADULT NURSE NOTE - OBJECTIVE STATEMENT
88y/o female A&ox2, ambulatory with walker, received in rm5a. MD at bedside for eval. bed in lowest position, side rails up, call bell in hand, safety maintained. awaiting further orders. will continue to monitor. 86y/o female A&ox2, ambulatory with walker, received in rm5a. pt arrive s/p mechanical fall at aMD at bedside for eval. bed in lowest position, side rails up, call bell in hand, safety maintained. awaiting further orders. will continue to monitor. 86y/o female A&ox2, ambulatory with walker, received in rm5a. pt arrives s/p mechanical fall at an assisted living facility. as per pt, was getting up off toilet and fell when she tried to ambulate. endorses head trauma, uses daily eliquis. denies LOC, dizziness, lightheadedness, n/v, blurry vision. arrives with laceration to L elbow, bleeding controlled at this time. pt following commands appropriately. respirations even and unlabored. pt in NAD. MD at bedside for eval. bed in lowest position, side rails up, call bell in hand, safety maintained. awaiting further orders. will continue to monitor.

## 2023-02-20 NOTE — ED PROVIDER NOTE - OBJECTIVE STATEMENT
87-year-old female with history of hypertension, dementia, atrial fibrillation, and anxiety brought in by ambulance for evaluation after trip and fall at assisted living prior to arrival.  Patient states she slipped in the bathroom and hit the back of her head.  No LOC.  Is on Eliquis.  Denies any headache, dizziness, confusion, or memory loss.  Denies any neck or back pain.  Denies chest pain or abdominal pain.  Small lac to left elbow.  Unsure of tetanus status.  Denies other injuries or complaints  PCP Rivas Matthews  Resident at [facility]

## 2023-03-01 NOTE — ED ADULT NURSE NOTE - CAS EDN DISCHARGE ASSESSMENT
Alert and oriented to person, place and time Low Dose Naltrexone Counseling- I discussed with the patient the potential risks and side effects of low dose naltrexone including but not limited to: more vivid dreams, headaches, nausea, vomiting, abdominal pain, fatigue, dizziness, and anxiety.

## 2023-04-24 NOTE — DISCHARGE NOTE NURSING/CASE MANAGEMENT/SOCIAL WORK - NSSCCONTNUM_GEN_ALL_CORE
[phone] [employer] Pregnancy And Lactation Text: This medication is Pregnancy Category B and is considered safe. There is some data to suggest avoiding during the third trimester. It is unknown if this medication is excreted in breast milk.

## 2023-06-27 NOTE — ED PROVIDER NOTE - MDM ORDERS SUBMITTED SELECTION
Group Topic: BH Process Group     Date: 6/27/2023  Start Time: 11:00 AM  End Time: 11:50 AM  Facilitators: Bridgett Johnson APSW    Focus: Recovery  Number in attendance: 7    Patients were given the opportunity to share individually about goals, current stressors and therapeutic assignments. Group and therapist feedback is welcomed and maladaptive skills are challenged with coping options.     JUANJOSE Erwin        Method: Group  Attendance: Present  Participation: Active  Patient Response: Attentive  Mood: Anxious  Affect: Type: Anxious   Range: Full (normal)   Congruency: Congruent   Stability: Stable  Behavior/Socialization: Engaged  Thought Process: Organized  Task Performance: Follows directions  Patient Evaluation: Independent - full participation    Pt presented with anxious mood and he demonstrated insight into his mental health. Pt reported that his goals for tonight are to read, call mother, and read some scriptures. Pt shared that he is work on challenging his self-defeating thinking. Pt informed that what is going well for him is his ability to connect with his support system and demonstrate insight into his mental health. Pt identify spending time with friends and listening to music as healthy coping tools to manage symptoms.    Labs/EKG/Imaging Studies/Medications

## 2023-07-13 NOTE — PROGRESS NOTE ADULT - ATTENDING COMMENTS
I agree with the above with the following additions and exceptions:    86yo female with pmhx htn who presents 1 day after an unwitnessed fall admitted for metabolic encephalopathy admitted for a UTI, age indeterminate fracture of acetabulum, and tachycardia    Overnight with episode of tachy to 140s, This AM, reports some pain in R lower pelvic area. Denies weakness, cp, sob, palpitations, dizziness    Metabolic encephalopathy due to UTI  -c/w iv zosyn  -f/u urine and bcx  -wbc 29, trend cbc    LEIGH likely prerenal from poor PO intake  - cr improving, IVF, trend bmp    Tachycardia likely 2/2 infection, pain, ?pSVT  - telemetry most with HR in 80s with transient elevations to 140s with pacs and SVT per cards  - EKG   - cards consult appreciated rec start metoprolol 12.5mg bid  - TTE  - IVF, hold home bp meds as bp low normal  - pain control    age indeterminate fracture of acetabulum  -ortho consult: recs pending pt declines family notification of hospital stay/Patient I agree with the above with the following additions and exceptions:    86yo female with pmhx htn who presents 1 day after an unwitnessed fall admitted for metabolic encephalopathy admitted for a UTI, age indeterminate fracture of acetabulum, and tachycardia    Overnight with episode of tachy to 140s, This AM, reports some pain in R lower pelvic area. Denies weakness, cp, sob, palpitations, dizziness    Metabolic encephalopathy due to UTI  -c/w iv zosyn  -f/u urine and bcx  -wbc 29, trend cbc    Rhabdo from fall, LEIGH likely prerenal from poor PO intake  - cr improving, IVF, trend bmp  - repeat CK elvels    Tachycardia likely 2/2 infection, pain, ?pSVT  - telemetry most with HR in 80s with transient elevations to 140s with pacs and SVT per cards  - EKG   - cards consult appreciated rec start metoprolol 12.5mg bid  - TTE  - IVF, hold home bp meds as bp low normal  - pain control    age indeterminate fracture of acetabulum  -ortho consult: recs pending I agree with the above with the following additions and exceptions:    88yo female with pmhx htn who presents 1 day after an unwitnessed fall admitted for metabolic encephalopathy admitted for a UTI, age indeterminate fracture of acetabulum, and tachycardia    Overnight with episode of tachy to 140s, This AM, reports some pain in R lower pelvic area. Denies weakness, cp, sob, palpitations, dizziness    Metabolic encephalopathy due to UTI  -c/w iv zosyn  -f/u urine and bcx  -wbc 29, trend cbc    Rhabdo from fall, LEIGH likely prerenal from poor PO intake  - cr improving, IVF, trend bmp  - repeat CK levels    Tachycardia likely 2/2 infection, pain, ?pSVT  - telemetry most with HR in 80s with transient elevations to 140s with pacs and SVT per cards  -k low 3.0, replete and trend  - EKG   - cards consult appreciated rec start metoprolol 12.5mg bid  - TTE  - IVF, hold home bp meds as bp low normal  - pain control    age indeterminate fracture of acetabulum  -ortho consult: recs pending I agree with the above with the following additions and exceptions:    88yo female with pmhx htn who presents 1 day after an unwitnessed fall admitted for severe sepsis with gram negative bacteremia metabolic encephalopathy admitted for a UTI, age indeterminate fracture of acetabulum, and tachycardia    Overnight with episode of tachy to 140s, This AM, reports some pain in R lower pelvic area. Denies weakness, cp, sob, palpitations, dizziness    Severe sepsis with gram negative bacteremia and Metabolic encephalopathy due to UTI  -c/w iv zosyn  -f/u final blood cx and urine cx  -repeat blood cx in AM  -wbc 26 from 25, trend cbc  -ID consult    Rhabdo from fall, LEIGH likely prerenal from poor PO intake  - cr improving, IVF, trend bmp  - repeat CK levels    Tachycardia likely 2/2 infection, pain, ?pSVT  - telemetry most with HR in 80s with transient elevations to 140s with pacs and SVT per cards  -k low 3.0, replete and trend  - EKG   - cards consult appreciated rec start metoprolol 12.5mg bid  - TTE  - IVF, hold home bp meds as bp low normal  - pain control    age indeterminate fracture of acetabulum  -ortho consult: recs pending    discussed with son in great detail I agree with the above with the following additions and exceptions:    86yo female with pmhx htn who presents 1 day after an unwitnessed fall admitted for severe sepsis with gram negative bacteremia metabolic encephalopathy admitted for a UTI, age indeterminate fracture of acetabulum, and tachycardia    Overnight with episode of tachy to 140s, This AM, reports some pain in R lower pelvic area. Denies weakness, cp, sob, palpitations, dizziness    Severe sepsis with gram negative bacteremia and Metabolic encephalopathy due to UTI  -c/w iv zosyn  -f/u final blood cx and urine cx  -repeat blood cx in AM  -wbc 26 from 25, trend cbc  -ID consult  -lactate resolved 1.1 from 2.3  -IVF  -if develops hypotension, MICU consult    Rhabdo from fall, LEIGH likely prerenal from poor PO intake  - cr improving, IVF, trend bmp  - repeat CK levels    Tachycardia likely 2/2 infection, pain, ?pSVT  - telemetry most with HR in 80s with transient elevations to 140s with pacs and SVT per cards  -k low 3.0, replete and trend  - EKG   - cards consult appreciated rec start metoprolol 12.5mg bid  - TTE  - IVF, hold home bp meds as bp low normal  - pain control    age indeterminate fracture of acetabulum  -ortho consult: recs pending    discussed with son in great detail

## 2023-09-26 RX ORDER — ACETAMINOPHEN 500 MG
2 TABLET ORAL
Qty: 0 | Refills: 0 | DISCHARGE

## 2023-09-26 RX ORDER — HYDROCORTISONE 1 %
1 OINTMENT (GRAM) TOPICAL
Qty: 0 | Refills: 0 | DISCHARGE

## 2023-09-26 RX ORDER — AMLODIPINE AND VALSARTAN 5; 320 MG/1; MG/1
0.5 TABLET, FILM COATED ORAL
Qty: 0 | Refills: 0 | DISCHARGE

## 2024-09-16 NOTE — ED ADULT NURSE NOTE - HOW OFTEN DO YOU HAVE A DRINK CONTAINING ALCOHOL?
Naren GRIFFITH nurse called and asked that writer send script for quantity of 1 tablet for Cipro 500 mg and Metronidazole 250 mg to pharmacy for prophylaxis for colonoscopy.   
Never
steady

## 2024-10-06 ENCOUNTER — INPATIENT (INPATIENT)
Facility: HOSPITAL | Age: 89
LOS: 7 days | Discharge: NURSING, REHAB OR LONG-TERM CARE | DRG: 536 | End: 2024-10-14
Attending: INTERNAL MEDICINE | Admitting: STUDENT IN AN ORGANIZED HEALTH CARE EDUCATION/TRAINING PROGRAM
Payer: MEDICARE

## 2024-10-06 VITALS
OXYGEN SATURATION: 97 % | WEIGHT: 139.99 LBS | HEIGHT: 60 IN | TEMPERATURE: 98 F | SYSTOLIC BLOOD PRESSURE: 138 MMHG | HEART RATE: 72 BPM | RESPIRATION RATE: 16 BRPM | DIASTOLIC BLOOD PRESSURE: 61 MMHG

## 2024-10-06 PROCEDURE — 76376 3D RENDER W/INTRP POSTPROCES: CPT | Mod: 26

## 2024-10-06 PROCEDURE — 70450 CT HEAD/BRAIN W/O DYE: CPT | Mod: 26,MC

## 2024-10-06 PROCEDURE — 72131 CT LUMBAR SPINE W/O DYE: CPT | Mod: 26,MC

## 2024-10-06 PROCEDURE — 72125 CT NECK SPINE W/O DYE: CPT | Mod: 26,MC

## 2024-10-06 PROCEDURE — 99285 EMERGENCY DEPT VISIT HI MDM: CPT | Mod: FS

## 2024-10-06 PROCEDURE — 72192 CT PELVIS W/O DYE: CPT | Mod: 26,MC

## 2024-10-06 RX ORDER — ACETAMINOPHEN 325 MG
650 TABLET ORAL ONCE
Refills: 0 | Status: COMPLETED | OUTPATIENT
Start: 2024-10-06 | End: 2024-10-06

## 2024-10-06 RX ADMIN — Medication 650 MILLIGRAM(S): at 22:12

## 2024-10-06 NOTE — ED PROVIDER NOTE - CLINICAL SUMMARY MEDICAL DECISION MAKING FREE TEXT BOX
87-year-old female with history of hypertension, dementia, atrial fibrillation, and anxiety pw fall and right hip pain,   fell while trying to get something from fridge was then complaining of pain while trying to walk. unsure if head injury ,  tender in right hip  will check CT head, pelvis, dispo pending Cts

## 2024-10-06 NOTE — ED ADULT NURSE REASSESSMENT NOTE - NS ED NURSE REASSESS COMMENT FT1
assumed care of pt pt resting I stretcher pending results and disposition, offers no complaints at this time. assumed care of pt pt resting I stretcher pending results and disposition, offers c/o pain to right leg/buttock.  pending orders from MD.

## 2024-10-06 NOTE — ED ADULT NURSE REASSESSMENT NOTE - NS ED NURSE REASSESS COMMENT FT1
Patient family member followed up on MD to check her mother lying down in the bed; advised family member that MD are checking patient on a case to case basis and will see patient when they are done with the critical patients, understood the explanation

## 2024-10-06 NOTE — ED PROVIDER NOTE - OBJECTIVE STATEMENT
89 y/o female with PMHX HTN and Dementia presents today due to fall. pt reports she was at the refrigerator in which she dropped a bottle. She attempted to pick it up but lost her balance. Reports pain to right hip and back, described as aching, non-radiating, and currently 8/10. Pt admits to head injury. Denies LOC, vomiting, blood thinner use, laceration, cp, sob, abd pain, or any other complaints.

## 2024-10-06 NOTE — ED ADULT NURSE NOTE - OBJECTIVE STATEMENT
Pt presents to the ED c/o fall. Endorses R knee, hip and back pain. Unknown head strike, unknown LOC. Pt is well appearing, in no acute distress. R knee appears slightly swollen. No other obvious injuries/ deformities.

## 2024-10-06 NOTE — ED ADULT NURSE NOTE - NSFALLOOBATTEMPT_ED_ALL_ED
[No Acute Distress] : no acute distress [Well Nourished] : well nourished [Well Developed] : well developed [Well-Appearing] : well-appearing [Thyroid Normal, No Nodules] : the thyroid was normal and there were no nodules present [No Respiratory Distress] : no respiratory distress  [No Accessory Muscle Use] : no accessory muscle use [Clear to Auscultation] : lungs were clear to auscultation bilaterally [Normal Rate] : normal rate  [Regular Rhythm] : with a regular rhythm [Normal S1, S2] : normal S1 and S2 [No Murmur] : no murmur heard [No Edema] : there was no peripheral edema [Normal Gait] : normal gait No

## 2024-10-06 NOTE — ED PROVIDER NOTE - PHYSICAL EXAMINATION
Constitutional: Awake, Alert, non-toxic  HEAD: Normocephalic, atraumatic.   EYES: EOM intact, conjunctiva and sclera are clear bilaterally  ENT: No rhinorrhea, normal pharynx  NECK: Supple, non-tender  BACK: No midline or paraspinal TTP of cervical/thoracic/lumbar spine, FROM. No ecchymosis or hematomas.   CARDIOVASCULAR: Normal S1, S2; regular rate and rhythm.  RESPIRATORY: Normal respiratory effort; breath sounds CTAB, no wheezes, rhonchi, or rales. Speaking in full sentences. No accessory muscle use.   ABDOMEN: Soft; non-tender, non-distended  EXTREMITIES: Full passive and active ROM in all extremities; (+) mild right hip TTP, knee and distal extremities non-tender to palpation; distal pulses palpable and symmetric, no edema, no crepitus or step off  SKIN: Warm, dry; good skin turgor, no apparent lesions or rashes, no ecchymosis, brisk capillary refill.  NEURO: A&O x3. Sensory and motor functions are grossly intact. Speech is normal. Appearance and judgement seem appropriate for gender and age. No neurological deficits. Neurovascular sensation intact motor function 5/5 of upper and lower extremities, CN II-XII grossly intact, no ataxia, absent pronator drift, intact cerebellar function

## 2024-10-06 NOTE — ED ADULT TRIAGE NOTE - CHIEF COMPLAINT QUOTE
Patient brought by ambulance from [facility] fell complaining of right knee pain

## 2024-10-06 NOTE — ED PROVIDER NOTE - PROGRESS NOTE DETAILS
Reza ATTG   patient to be observed overnight and have labs/for the labs drawn given leukocytosis and patient require more pain control require physical therapy CT scan of the pelvis still to result Reza Burrell the son updated  regarding fxs

## 2024-10-06 NOTE — ED ADULT NURSE NOTE - NSFALLHARMRISKINTERV_ED_ALL_ED

## 2024-10-07 DIAGNOSIS — S32.9XXA FRACTURE OF UNSPECIFIED PARTS OF LUMBOSACRAL SPINE AND PELVIS, INITIAL ENCOUNTER FOR CLOSED FRACTURE: ICD-10-CM

## 2024-10-07 DIAGNOSIS — Z29.9 ENCOUNTER FOR PROPHYLACTIC MEASURES, UNSPECIFIED: ICD-10-CM

## 2024-10-07 DIAGNOSIS — I10 ESSENTIAL (PRIMARY) HYPERTENSION: ICD-10-CM

## 2024-10-07 DIAGNOSIS — I48.91 UNSPECIFIED ATRIAL FIBRILLATION: ICD-10-CM

## 2024-10-07 DIAGNOSIS — D72.829 ELEVATED WHITE BLOOD CELL COUNT, UNSPECIFIED: ICD-10-CM

## 2024-10-07 DIAGNOSIS — F39 UNSPECIFIED MOOD [AFFECTIVE] DISORDER: ICD-10-CM

## 2024-10-07 PROBLEM — F03.90 UNSPECIFIED DEMENTIA, UNSPECIFIED SEVERITY, WITHOUT BEHAVIORAL DISTURBANCE, PSYCHOTIC DISTURBANCE, MOOD DISTURBANCE, AND ANXIETY: Chronic | Status: ACTIVE | Noted: 2023-02-10

## 2024-10-07 LAB
A1C WITH ESTIMATED AVERAGE GLUCOSE RESULT: 5.4 % — SIGNIFICANT CHANGE UP (ref 4–5.6)
ALBUMIN SERPL ELPH-MCNC: 3.5 G/DL — SIGNIFICANT CHANGE UP (ref 3.3–5)
ALBUMIN SERPL ELPH-MCNC: 3.7 G/DL — SIGNIFICANT CHANGE UP (ref 3.3–5)
ALP SERPL-CCNC: 71 U/L — SIGNIFICANT CHANGE UP (ref 40–120)
ALP SERPL-CCNC: 74 U/L — SIGNIFICANT CHANGE UP (ref 40–120)
ALT FLD-CCNC: 37 U/L — SIGNIFICANT CHANGE UP (ref 12–78)
ALT FLD-CCNC: 37 U/L — SIGNIFICANT CHANGE UP (ref 12–78)
ANION GAP SERPL CALC-SCNC: 10 MMOL/L — SIGNIFICANT CHANGE UP (ref 5–17)
ANION GAP SERPL CALC-SCNC: 9 MMOL/L — SIGNIFICANT CHANGE UP (ref 5–17)
APPEARANCE UR: CLEAR — SIGNIFICANT CHANGE UP
APTT BLD: 28.7 SEC — SIGNIFICANT CHANGE UP (ref 24.5–35.6)
APTT BLD: 29 SEC — SIGNIFICANT CHANGE UP (ref 24.5–35.6)
AST SERPL-CCNC: 29 U/L — SIGNIFICANT CHANGE UP (ref 15–37)
AST SERPL-CCNC: 32 U/L — SIGNIFICANT CHANGE UP (ref 15–37)
BASOPHILS # BLD AUTO: 0.04 K/UL — SIGNIFICANT CHANGE UP (ref 0–0.2)
BASOPHILS # BLD AUTO: 0.05 K/UL — SIGNIFICANT CHANGE UP (ref 0–0.2)
BASOPHILS NFR BLD AUTO: 0.2 % — SIGNIFICANT CHANGE UP (ref 0–2)
BASOPHILS NFR BLD AUTO: 0.3 % — SIGNIFICANT CHANGE UP (ref 0–2)
BILIRUB SERPL-MCNC: 0.7 MG/DL — SIGNIFICANT CHANGE UP (ref 0.2–1.2)
BILIRUB SERPL-MCNC: 0.9 MG/DL — SIGNIFICANT CHANGE UP (ref 0.2–1.2)
BILIRUB UR-MCNC: NEGATIVE — SIGNIFICANT CHANGE UP
BUN SERPL-MCNC: 19 MG/DL — SIGNIFICANT CHANGE UP (ref 7–23)
BUN SERPL-MCNC: 21 MG/DL — SIGNIFICANT CHANGE UP (ref 7–23)
CALCIUM SERPL-MCNC: 8.6 MG/DL — SIGNIFICANT CHANGE UP (ref 8.5–10.1)
CALCIUM SERPL-MCNC: 8.9 MG/DL — SIGNIFICANT CHANGE UP (ref 8.5–10.1)
CHLORIDE SERPL-SCNC: 101 MMOL/L — SIGNIFICANT CHANGE UP (ref 96–108)
CHLORIDE SERPL-SCNC: 103 MMOL/L — SIGNIFICANT CHANGE UP (ref 96–108)
CHOLEST SERPL-MCNC: 225 MG/DL — HIGH
CO2 SERPL-SCNC: 22 MMOL/L — SIGNIFICANT CHANGE UP (ref 22–31)
CO2 SERPL-SCNC: 23 MMOL/L — SIGNIFICANT CHANGE UP (ref 22–31)
COLOR SPEC: YELLOW — SIGNIFICANT CHANGE UP
CREAT SERPL-MCNC: 0.71 MG/DL — SIGNIFICANT CHANGE UP (ref 0.5–1.3)
CREAT SERPL-MCNC: 0.93 MG/DL — SIGNIFICANT CHANGE UP (ref 0.5–1.3)
DIFF PNL FLD: ABNORMAL
EGFR: 59 ML/MIN/1.73M2 — LOW
EGFR: 82 ML/MIN/1.73M2 — SIGNIFICANT CHANGE UP
EOSINOPHIL # BLD AUTO: 0 K/UL — SIGNIFICANT CHANGE UP (ref 0–0.5)
EOSINOPHIL # BLD AUTO: 0.01 K/UL — SIGNIFICANT CHANGE UP (ref 0–0.5)
EOSINOPHIL NFR BLD AUTO: 0 % — SIGNIFICANT CHANGE UP (ref 0–6)
EOSINOPHIL NFR BLD AUTO: 0 % — SIGNIFICANT CHANGE UP (ref 0–6)
ESTIMATED AVERAGE GLUCOSE: 108 MG/DL — SIGNIFICANT CHANGE UP (ref 68–114)
GLUCOSE SERPL-MCNC: 120 MG/DL — HIGH (ref 70–99)
GLUCOSE SERPL-MCNC: 137 MG/DL — HIGH (ref 70–99)
GLUCOSE UR QL: NEGATIVE MG/DL — SIGNIFICANT CHANGE UP
HCT VFR BLD CALC: 34.6 % — SIGNIFICANT CHANGE UP (ref 34.5–45)
HCT VFR BLD CALC: 34.9 % — SIGNIFICANT CHANGE UP (ref 34.5–45)
HDLC SERPL-MCNC: 58 MG/DL — SIGNIFICANT CHANGE UP
HGB BLD-MCNC: 11.2 G/DL — LOW (ref 11.5–15.5)
HGB BLD-MCNC: 11.9 G/DL — SIGNIFICANT CHANGE UP (ref 11.5–15.5)
IMM GRANULOCYTES NFR BLD AUTO: 0.9 % — SIGNIFICANT CHANGE UP (ref 0–0.9)
IMM GRANULOCYTES NFR BLD AUTO: 1.2 % — HIGH (ref 0–0.9)
INR BLD: 0.98 RATIO — SIGNIFICANT CHANGE UP (ref 0.85–1.16)
INR BLD: 1.01 RATIO — SIGNIFICANT CHANGE UP (ref 0.85–1.16)
KETONES UR-MCNC: 15 MG/DL
LEUKOCYTE ESTERASE UR-ACNC: ABNORMAL
LIPID PNL WITH DIRECT LDL SERPL: 156 MG/DL — HIGH
LYMPHOCYTES # BLD AUTO: 1.13 K/UL — SIGNIFICANT CHANGE UP (ref 1–3.3)
LYMPHOCYTES # BLD AUTO: 1.22 K/UL — SIGNIFICANT CHANGE UP (ref 1–3.3)
LYMPHOCYTES # BLD AUTO: 5.6 % — LOW (ref 13–44)
LYMPHOCYTES # BLD AUTO: 7.5 % — LOW (ref 13–44)
MCHC RBC-ENTMCNC: 30.9 PG — SIGNIFICANT CHANGE UP (ref 27–34)
MCHC RBC-ENTMCNC: 32.3 PG — SIGNIFICANT CHANGE UP (ref 27–34)
MCHC RBC-ENTMCNC: 32.4 GM/DL — SIGNIFICANT CHANGE UP (ref 32–36)
MCHC RBC-ENTMCNC: 34.1 GM/DL — SIGNIFICANT CHANGE UP (ref 32–36)
MCV RBC AUTO: 94.8 FL — SIGNIFICANT CHANGE UP (ref 80–100)
MCV RBC AUTO: 95.3 FL — SIGNIFICANT CHANGE UP (ref 80–100)
MONOCYTES # BLD AUTO: 0.89 K/UL — SIGNIFICANT CHANGE UP (ref 0–0.9)
MONOCYTES # BLD AUTO: 1.22 K/UL — HIGH (ref 0–0.9)
MONOCYTES NFR BLD AUTO: 5.6 % — SIGNIFICANT CHANGE UP (ref 2–14)
MONOCYTES NFR BLD AUTO: 5.9 % — SIGNIFICANT CHANGE UP (ref 2–14)
NEUTROPHILS # BLD AUTO: 12.82 K/UL — HIGH (ref 1.8–7.4)
NEUTROPHILS # BLD AUTO: 18.89 K/UL — HIGH (ref 1.8–7.4)
NEUTROPHILS NFR BLD AUTO: 85.4 % — HIGH (ref 43–77)
NEUTROPHILS NFR BLD AUTO: 87.4 % — HIGH (ref 43–77)
NITRITE UR-MCNC: NEGATIVE — SIGNIFICANT CHANGE UP
NON HDL CHOLESTEROL: 167 MG/DL — HIGH
NRBC # BLD: 0 /100 WBCS — SIGNIFICANT CHANGE UP (ref 0–0)
NRBC # BLD: 0 /100 WBCS — SIGNIFICANT CHANGE UP (ref 0–0)
PH UR: 7.5 — SIGNIFICANT CHANGE UP (ref 5–8)
PLATELET # BLD AUTO: 232 K/UL — SIGNIFICANT CHANGE UP (ref 150–400)
PLATELET # BLD AUTO: 245 K/UL — SIGNIFICANT CHANGE UP (ref 150–400)
POTASSIUM SERPL-MCNC: 3.7 MMOL/L — SIGNIFICANT CHANGE UP (ref 3.5–5.3)
POTASSIUM SERPL-MCNC: 3.8 MMOL/L — SIGNIFICANT CHANGE UP (ref 3.5–5.3)
POTASSIUM SERPL-SCNC: 3.7 MMOL/L — SIGNIFICANT CHANGE UP (ref 3.5–5.3)
POTASSIUM SERPL-SCNC: 3.8 MMOL/L — SIGNIFICANT CHANGE UP (ref 3.5–5.3)
PROT SERPL-MCNC: 7.4 G/DL — SIGNIFICANT CHANGE UP (ref 6–8.3)
PROT SERPL-MCNC: 7.7 G/DL — SIGNIFICANT CHANGE UP (ref 6–8.3)
PROT UR-MCNC: 30 MG/DL
PROTHROM AB SERPL-ACNC: 11.5 SEC — SIGNIFICANT CHANGE UP (ref 9.9–13.4)
PROTHROM AB SERPL-ACNC: 11.8 SEC — SIGNIFICANT CHANGE UP (ref 9.9–13.4)
RBC # BLD: 3.63 M/UL — LOW (ref 3.8–5.2)
RBC # BLD: 3.68 M/UL — LOW (ref 3.8–5.2)
RBC # FLD: 13.4 % — SIGNIFICANT CHANGE UP (ref 10.3–14.5)
RBC # FLD: 13.5 % — SIGNIFICANT CHANGE UP (ref 10.3–14.5)
SODIUM SERPL-SCNC: 133 MMOL/L — LOW (ref 135–145)
SODIUM SERPL-SCNC: 135 MMOL/L — SIGNIFICANT CHANGE UP (ref 135–145)
SP GR SPEC: 1.01 — SIGNIFICANT CHANGE UP (ref 1–1.03)
TRIGL SERPL-MCNC: 63 MG/DL — SIGNIFICANT CHANGE UP
UROBILINOGEN FLD QL: 0.2 MG/DL — SIGNIFICANT CHANGE UP (ref 0.2–1)
WBC # BLD: 15.01 K/UL — HIGH (ref 3.8–10.5)
WBC # BLD: 21.64 K/UL — HIGH (ref 3.8–10.5)
WBC # FLD AUTO: 15.01 K/UL — HIGH (ref 3.8–10.5)
WBC # FLD AUTO: 21.64 K/UL — HIGH (ref 3.8–10.5)

## 2024-10-07 PROCEDURE — 72190 X-RAY EXAM OF PELVIS: CPT | Mod: 26

## 2024-10-07 PROCEDURE — 99232 SBSQ HOSP IP/OBS MODERATE 35: CPT | Mod: GC

## 2024-10-07 PROCEDURE — 71045 X-RAY EXAM CHEST 1 VIEW: CPT | Mod: 26

## 2024-10-07 PROCEDURE — 93010 ELECTROCARDIOGRAM REPORT: CPT

## 2024-10-07 PROCEDURE — 76770 US EXAM ABDO BACK WALL COMP: CPT | Mod: 26

## 2024-10-07 RX ORDER — AMIODARONE HYDROCHLORIDE 50 MG/ML
1 INJECTION, SOLUTION INTRAVENOUS
Refills: 0 | DISCHARGE

## 2024-10-07 RX ORDER — TRAMADOL HYDROCHLORIDE 50 MG/1
25 TABLET, COATED ORAL ONCE
Refills: 0 | Status: DISCONTINUED | OUTPATIENT
Start: 2024-10-07 | End: 2024-10-07

## 2024-10-07 RX ORDER — LIDOCAINE 50 MG/G
1 CREAM TOPICAL ONCE
Refills: 0 | Status: COMPLETED | OUTPATIENT
Start: 2024-10-07 | End: 2024-10-07

## 2024-10-07 RX ORDER — MORPHINE SULFATE 30 MG/1
1 TABLET, FILM COATED, EXTENDED RELEASE ORAL EVERY 6 HOURS
Refills: 0 | Status: DISCONTINUED | OUTPATIENT
Start: 2024-10-07 | End: 2024-10-07

## 2024-10-07 RX ORDER — AMLODIPINE BESYLATE 5 MG
5 TABLET ORAL DAILY
Refills: 0 | Status: DISCONTINUED | OUTPATIENT
Start: 2024-10-07 | End: 2024-10-13

## 2024-10-07 RX ORDER — MORPHINE SULFATE 30 MG/1
2 TABLET, FILM COATED, EXTENDED RELEASE ORAL ONCE
Refills: 0 | Status: DISCONTINUED | OUTPATIENT
Start: 2024-10-07 | End: 2024-10-07

## 2024-10-07 RX ORDER — TRAMADOL HYDROCHLORIDE 50 MG/1
50 TABLET, COATED ORAL EVERY 6 HOURS
Refills: 0 | Status: DISCONTINUED | OUTPATIENT
Start: 2024-10-07 | End: 2024-10-09

## 2024-10-07 RX ORDER — AMIODARONE HYDROCHLORIDE 50 MG/ML
100 INJECTION, SOLUTION INTRAVENOUS DAILY
Refills: 0 | Status: DISCONTINUED | OUTPATIENT
Start: 2024-10-07 | End: 2024-10-14

## 2024-10-07 RX ORDER — ACETAMINOPHEN 325 MG
650 TABLET ORAL EVERY 6 HOURS
Refills: 0 | Status: DISCONTINUED | OUTPATIENT
Start: 2024-10-07 | End: 2024-10-09

## 2024-10-07 RX ORDER — BUSPIRONE HCL 5 MG
7.5 TABLET ORAL
Refills: 0 | Status: DISCONTINUED | OUTPATIENT
Start: 2024-10-07 | End: 2024-10-14

## 2024-10-07 RX ORDER — ONDANSETRON HCL/PF 4 MG/2 ML
4 VIAL (ML) INJECTION ONCE
Refills: 0 | Status: DISCONTINUED | OUTPATIENT
Start: 2024-10-07 | End: 2024-10-14

## 2024-10-07 RX ORDER — ANTI-ITCH CREAM 1 G/100G
1 OINTMENT TOPICAL DAILY
Refills: 0 | Status: DISCONTINUED | OUTPATIENT
Start: 2024-10-07 | End: 2024-10-14

## 2024-10-07 RX ORDER — METOPROLOL TARTRATE 50 MG
25 TABLET ORAL
Refills: 0 | Status: DISCONTINUED | OUTPATIENT
Start: 2024-10-07 | End: 2024-10-14

## 2024-10-07 RX ORDER — BUSPIRONE HCL 5 MG
1 TABLET ORAL
Refills: 0 | DISCHARGE

## 2024-10-07 RX ORDER — TRAMADOL HYDROCHLORIDE 50 MG/1
25 TABLET, COATED ORAL EVERY 6 HOURS
Refills: 0 | Status: DISCONTINUED | OUTPATIENT
Start: 2024-10-07 | End: 2024-10-09

## 2024-10-07 RX ORDER — MORPHINE SULFATE 30 MG/1
0.5 TABLET, FILM COATED, EXTENDED RELEASE ORAL EVERY 6 HOURS
Refills: 0 | Status: DISCONTINUED | OUTPATIENT
Start: 2024-10-07 | End: 2024-10-07

## 2024-10-07 RX ORDER — CEFTRIAXONE SODIUM 1 G
1000 VIAL (EA) INJECTION ONCE
Refills: 0 | Status: DISCONTINUED | OUTPATIENT
Start: 2024-10-07 | End: 2024-10-07

## 2024-10-07 RX ADMIN — Medication 650 MILLIGRAM(S): at 09:24

## 2024-10-07 RX ADMIN — Medication 650 MILLIGRAM(S): at 14:56

## 2024-10-07 RX ADMIN — LIDOCAINE 1 PATCH: 50 CREAM TOPICAL at 00:54

## 2024-10-07 RX ADMIN — Medication 650 MILLIGRAM(S): at 08:54

## 2024-10-07 RX ADMIN — AMIODARONE HYDROCHLORIDE 100 MILLIGRAM(S): 50 INJECTION, SOLUTION INTRAVENOUS at 08:55

## 2024-10-07 RX ADMIN — MORPHINE SULFATE 1 MILLIGRAM(S): 30 TABLET, FILM COATED, EXTENDED RELEASE ORAL at 05:50

## 2024-10-07 RX ADMIN — LIDOCAINE 1 PATCH: 50 CREAM TOPICAL at 08:48

## 2024-10-07 RX ADMIN — MORPHINE SULFATE 2 MILLIGRAM(S): 30 TABLET, FILM COATED, EXTENDED RELEASE ORAL at 02:05

## 2024-10-07 RX ADMIN — Medication 25 MILLIGRAM(S): at 05:51

## 2024-10-07 RX ADMIN — Medication 7.5 MILLIGRAM(S): at 08:55

## 2024-10-07 RX ADMIN — MORPHINE SULFATE 2 MILLIGRAM(S): 30 TABLET, FILM COATED, EXTENDED RELEASE ORAL at 02:34

## 2024-10-07 RX ADMIN — TRAMADOL HYDROCHLORIDE 25 MILLIGRAM(S): 50 TABLET, COATED ORAL at 18:34

## 2024-10-07 RX ADMIN — Medication 25 MILLIGRAM(S): at 17:54

## 2024-10-07 RX ADMIN — LIDOCAINE 1 PATCH: 50 CREAM TOPICAL at 15:00

## 2024-10-07 RX ADMIN — Medication 7.5 MILLIGRAM(S): at 17:54

## 2024-10-07 RX ADMIN — Medication 5 MILLIGRAM(S): at 05:51

## 2024-10-07 NOTE — H&P ADULT - NSHPPHYSICALEXAM_GEN_ALL_CORE
T(C): 36.7 (10-07-24 @ 00:06), Max: 36.7 (10-06-24 @ 19:52)  HR: 89 (10-07-24 @ 02:05) (72 - 89)  BP: 138/93 (10-07-24 @ 02:05) (138/61 - 146/81)  RR: 18 (10-07-24 @ 02:05) (16 - 18)  SpO2: 96% (10-07-24 @ 02:05) (96% - 97%)    GENERAL: patient appears well, no acute distress, appropriate, pleasant  EYES: sclera clear, no exudates  ENMT: oropharynx clear without erythema, no exudates, moist mucous membranes  NECK: supple, soft, no thyromegaly noted  LUNGS: good air entry bilaterally, clear to auscultation, symmetric breath sounds, no wheezing or rhonchi appreciated  HEART: soft S1/S2, regular rate and rhythm, no murmurs noted, no lower extremity edema  GASTROINTESTINAL: abdomen is soft, nontender, nondistended, normoactive bowel sounds, no palpable masses  INTEGUMENT: good skin turgor, +   MUSCULOSKELETAL: decrease RLE motor strength and ROM in hip flexion, abduction and adduction on passive movement 2/2 to pain. LLE motor strength and ROM intact.   NEUROLOGIC: awake, alert, oriented x3, good muscle tone in 4 extremities, no B/L LE sensory deficits   PSYCHIATRIC: mood is good, affect is congruent, linear and logical thought process  HEME/LYMPH: no palpable supraclavicular nodules, no obvious ecchymosis or petechiae

## 2024-10-07 NOTE — CAREGIVER ENGAGEMENT NOTE - CAREGIVER EDUCATION NOTES - FREE TEXT
Reviewed That PT has to see pt and that there is a possibility that pt may need EVERT. Pts son expressed understanding.

## 2024-10-07 NOTE — CONSULT NOTE ADULT - SUBJECTIVE AND OBJECTIVE BOX
HPI:  89 y.o. F with PMHx of Afib not on ac, HTN, dementia (A&Ox3) presents to the ED s/p fall. Patient states that she was at the refrigerator when she dropped a bottle. As patient was bending down to  the bottle, she slipped and fell backwards onto wooden floor her right side. Reports that she was unable to pick herself back up, so called for help using her medical alert necklace. Patient states that she was on the floor for less than half hour before she was helped up. Denies hitting her head or neck, and loss of consciousness. States that she was alert during and after the fall, denies being on anticoagulation. At the time of interview, patient states that she continues to have dull achy 10/10 pain of her right lower back radiating to posterior leg and right groin. She states that the pain worsens with movement or laying down on her right side.  Patient admitted with Right-sided acute pelvic fractures. Urology consulted for CT finding of pelvic sidewall hematoma, small amount of presumed blood abutting the bladder. Patient voiding into diaper. UA performed with trace blood.  Patient with baseline dementia and [place]. History obtained mainly from chart review and discussion with son at bedside.    PAST MEDICAL & SURGICAL HISTORY:  Hypertension  Dementia  AF (atrial fibrillation) (off eliquis)  No significant past surgical history    MEDICATIONS:  MEDICATIONS  (STANDING):  aMIOdarone    Tablet 100 milliGRAM(s) Oral daily  amLODIPine   Tablet 5 milliGRAM(s) Oral daily  busPIRone 7.5 milliGRAM(s) Oral two times a day  metoprolol tartrate 25 milliGRAM(s) Oral two times a day  ondansetron Injectable 4 milliGRAM(s) IV Push once    MEDICATIONS  (PRN):  acetaminophen     Tablet .. 650 milliGRAM(s) Oral every 6 hours PRN Temp greater or equal to 38C (100.4F), Mild Pain (1 - 3)  hydrocortisone 2.5% Rectal Cream 1 Application(s) Rectal daily PRN Rectal pain  traMADol 50 milliGRAM(s) Oral every 6 hours PRN Severe Pain (7 - 10)  traMADol 25 milliGRAM(s) Oral every 6 hours PRN Moderate Pain (4 - 6)    ALLERGIES:  Allergy Status Unknown    SOCIAL HISTORY:  Tobacco: denies  EtOH: denies  Recreational drug use: denies  Lives with: patient lives at [facility].   Ambulates: with walker   ADLs: dependent of health aides at [facility] (07 Oct 2024 02:35)    FAMILY HISTORY:  FHx: hypertension (Mother)  FHx: diabetes mellitus (Mother)    VITAL SIGNS:  Vital Signs Last 24 Hrs  T(C): 36.4 (07 Oct 2024 08:06), Max: 36.7 (07 Oct 2024 00:06)  T(F): 97.5 (07 Oct 2024 08:06), Max: 98 (07 Oct 2024 00:06)  HR: 81 (07 Oct 2024 17:17) (72 - 89)  BP: 128/80 (07 Oct 2024 17:17) (114/68 - 146/81)  RR: 17 (07 Oct 2024 08:06) (17 - 18)  SpO2: 97% (07 Oct 2024 08:06) (96% - 97%)    PHYSICAL EXAM:  GENERAL: Elderly female lying in bed in NAD.  HEENT:  NC/AT. Sclera white.   CARDIO:  Regular rate and rhythm.   RESPIRATORY:  Nonlabored breathing, no accessory muscle use.  ABDOMEN:  Soft, nondistended, nontender. No suprapubic tenderness. No rebound tenderness or guarding.  SKIN:  No jaundice, pallor, or cyanosis  NEURO:  Alert    LABS:                        11.2   15.01 )-----------( 232      ( 07 Oct 2024 06:40 )             34.6     10-07    133[L]  |  101  |  19  ----------------------------<  120[H]  3.8   |  22  |  0.71    Ca    8.6      07 Oct 2024 06:40    TPro  7.4  /  Alb  3.5  /  TBili  0.9  /  DBili  x   /  AST  29  /  ALT  37  /  AlkPhos  71  10-07    LIVER FUNCTIONS - ( 07 Oct 2024 06:40 )  Alb: 3.5 g/dL / Pro: 7.4 g/dL / ALK PHOS: 71 U/L / ALT: 37 U/L / AST: 29 U/L / GGT: x           PT/INR - ( 07 Oct 2024 06:40 )   PT: 11.8 sec;   INR: 1.01 ratio    PTT - ( 07 Oct 2024 06:40 )  PTT:29.0 sec    Urinalysis with Rflx Culture (collected 07 Oct 2024 19:00)    RADIOLOGY & ADDITIONAL STUDIES:  < from: US Kidney and Bladder (10.07.24 @ 18:23) >  IMPRESSION:  RIGHT pelvic sidewall hematoma observed on CT is occult on ultrasound      < from: CT Pelvis No Cont (10.06.24 @ 23:21) >    FINDINGS:  Gallbladder and biliary ducts: Cholelithiasis.  Vasculature: Atherosclerosis.  Reproductive: Uterine calcifications likely secondary to fibroids.  Bones/joints: Degenerative changes of the lower lumbar spine. Right   sacral ala  comminuted fracture. Degenerative changes of the SI joints. Right-sided  superior and inferior rami comminuted displaced fractures. Severe   degenerative  changes of the hip joint spaces. Joint space narrowing and spurring. No   displaced femoral  fracture or dislocation.  Soft tissues: Small amount of right pelvic sidewall fluid. Small amount of  fluid abutting the right lateral inferior margin of the bladder.    IMPRESSION:  1.   Right-sided acute pelvic fractures.  2.   Pelvic sidewall hematoma. Small amount of presumed blood abutting the  bladder. Injury to the bladder cannot be excluded. Recommend clinical  correlation and follow-up.

## 2024-10-07 NOTE — H&P ADULT - HISTORY OF PRESENT ILLNESS
89 y.o. F with PMHx of Afib not on ac, HTN, dementia (A&Ox3) presents to the ED s/p fall. Patient states that she was at the refrigerator when she dropped a bottle. As patient was bending down to  the bottle, she slipped and fell backwards onto wooden floor her right side. Reports that she was unable to pick herself back up, so called for help using her medical alert necklace. Patient states that she was on the floor for less than half hour before she was helped up. Denies hitting her head or neck, and loss of consciousness. States that she was alert during and after the fall, denies being on anticoagulation. At the time of interview, patient states that she continues to have dull achy 10/10 pain of her right lower back radiating to posterior leg and right groin. She states that the pain worsens with movement or laying down on her right side.     ED course:  Vitals: BP: 146/81, HR: 80bpm, Temp: 98F, RR: 18, SpO2: 97% on RA  Labs significant for: WBC 21.64, Gluc 137   UA: ordered   EKG: Sinus rhythm with occasional PVCs at 80bpm   In ED given: Tylenol 650mg PO x1, morphine 2mg IVP x2, lidocaine 4% patch x1,     Imaging  CT HEAD: No acute intracranial hemorrhage, mass effect, or osseous fracture.    CT LUMBAR SPINE: No acute lumbar spine fracture or traumatic malalignment.    CT CERVICAL SPINE: No acute cervical spine fracture or traumatic malalignment.    CT Pelvis: prelim report  1.   Right-sided acute pelvic fractures.  2.   Pelvic sidewall hematoma. Small amount of presumed blood abutting thebladder. Injury to the bladder cannot be excluded. Recommend clinical correlation and follow-up.

## 2024-10-07 NOTE — H&P ADULT - ATTENDING COMMENTS
89 y.o. F with PMHx of Afib not on ac, HTN, dementia (A&Ox3) presents to the ED s/p fall. Admitted for right pelvic fractures.     #Pelvic Fracture  Patient presents s/p mechanical fall. Found with right sacral ala comminuted fracture, right-sided superior and inferior rami comminuted displaced fractures on CT Pelvis.   - Also with pelvic sidewall hematoma. Small amount of presumed blood abutting the bladder. Injury to the bladder cannot be excluded.   - CT head, cervical and lumbar spine negative    - Tylenol 650mg PO q6h for mild pain, morphine 0.5mg q6h for moderate pain, morphine 1.0mg q6h for severe pain   - Fall precautions  - PT eval  - Social work eval  - Ortho consulted - no surgical intervention indicated, WBAT    #Leukocytosis  Leukocytosis 21.64 with neutrophil predominance. Afebrile. HDS.   - F/u Lactate level    - F/u UA  - F/u BCx and UCx   - Trend WBC and monitor for fever   - Monitor off abx    DVT PPX: SCDs, hold chemical ac due to small pelvic sidewall hematoma and small amount of presumed blood abutting bladder on CT Pelvis. Consider repeat CT scan to eval for evolution of hematoma, if stable can start ac.

## 2024-10-07 NOTE — PHYSICAL THERAPY INITIAL EVALUATION ADULT - ADDITIONAL COMMENTS
Patient report she lives at an longterm alone. Patient states she is dependent w/ all ADLs and uses a rolling walker for ambulation.

## 2024-10-07 NOTE — PATIENT PROFILE ADULT - DO YOU EVER NEED HELP READING HOSPITAL MATERIALS?
[facility] - Observation 11H  Endocrinology  Progress Note    Admit Date: 5/22/2024     Tasha Hawley is a 68 y.o. female with a medical history of COPD (on 3-4L), HFpEF, BLE lymphedema, diastolic HF, indwelling suprapubic catheter, chronic pain on pain pump, hypothyroidism and concerns for prior adrenal insuffiencey.   She presented to the ED after having 6 falls prior to admission.  Recent stay at a SNF and since then she has worsened despite having her  at home to assist.  Reports are that her  is in his 80's and not much help though so there has been ongoing concern for medication non-compliance per the daughter.  Issues also recently with no having approval for home health due to insurance issues.  Has worsening discoloration of her legs concerning for cellulitis so antibiotics started.  Labs performed included thyroid function studies which were abnormal prompting consult to endocrinology.      Review of chart shows this patient has a long standing history of hypothyroidism with prior use of Levothyroxine and Liothyronine.  In prior years this was adjusted to LT4 alone.  More recently on chart review she appears to be on 150 mcg of Levothyroxine and she states her pill is blue in color which matches this description.  She does admit to compliance issues at times and her daughter agrees that is most likely the case.  Dosing at home currently is close to her weight predicted dose.      Lab Results   Component Value Date    TSH 26.179 (H) 05/22/2024    T2CGNHJ 5.2 05/17/2020    FREET4 0.69 (L) 05/22/2024     Previously seen in the outpatient endocrine clinic (6/2023) for evaluation of adrenal insufficiency concerns.  Had ACTH stimulation testing performed in the months prior to that visit with results as below.  Performed due to concerns for long standing opioid use causing AI.  Stimulation was noted to be in the setting of known hypoalbuminemia.  Due to cortisol being less than 18 the patient was  "placed on florinef and hydrocortisone and has been on this ever since.  Florinef possibly added in setting of low blood pressure and concerns for orthostatic hypotension.     Latest Reference Range & Units 01/16/23 07:57 01/16/23 08:35 01/16/23 09:10   Cortisol ug/dL 8.60 16.40 17.20     The stimulation testing appears to have been done in the setting of hypotension during a hospital admission when patient was noted to be on a dilaudid pain pump and needing vasopressor support.    Hydrocortisone and florinef doses were decreased on last endocrine visit but ultimately appear to be an ongoing medication though compliance is not entirely known even speaking with the patient and daughter on day of consult.        Interval HPI:   Overnight events:  Not feeling well this morning and having pain but with lower blood pressures.  Steroid dose held last night with plans for ACTH stim test this morning.  Reviewed timing and need for labs with nursing staff at bedside.  No acute events noted overnight.    BP (!) 80/42 (BP Location: Right arm, Patient Position: Sitting)   Pulse (!) 58   Temp 97.7 °F (36.5 °C) (Tympanic)   Resp 18   Ht 5' 5" (1.651 m)   Wt 96.6 kg (212 lb 15.4 oz)   LMP  (LMP Unknown)   SpO2 95%   Breastfeeding No   BMI 35.44 kg/m²     Labs Reviewed and Include    Recent Labs   Lab 05/24/24  0307   GLU 94   CALCIUM 8.1*      K 4.0   CO2 32*      BUN 10   CREATININE 1.0     Lab Results   Component Value Date    WBC 4.50 05/24/2024    HGB 9.7 (L) 05/24/2024    HCT 31.4 (L) 05/24/2024    MCV 89 05/24/2024     05/24/2024     Recent Labs   Lab 05/22/24  1357   TSH 26.179*   FREET4 0.69*     Lab Results   Component Value Date    HGBA1C 5.3 05/03/2024       Nutritional status:   Body mass index is 35.44 kg/m².  Lab Results   Component Value Date    ALBUMIN 3.0 (L) 05/22/2024    ALBUMIN 3.6 05/16/2024    ALBUMIN 3.6 05/08/2024     Lab Results   Component Value Date    PREALBUMIN 13 (L) " 02/02/2017       Estimated Creatinine Clearance: 62.7 mL/min (based on SCr of 1 mg/dL).    Accu-Checks  Recent Labs     05/23/24  2020   POCTGLUCOSE 87       Current Medications and/or Treatments Impacting Glycemic Control  Immunotherapy:    Immunosuppressants       None          Steroids:   Hormones (From admission, onward)      Start     Stop Route Frequency Ordered    05/24/24 1030  cosyntropin injection 0.25 mg         -- IV Once 05/24/24 0926    05/22/24 2100  fludrocortisone (FLORINEF) split tablet 50 mcg         -- Oral Daily 05/22/24 1817 05/22/24 1634  melatonin tablet 6 mg         -- Oral Nightly PRN 05/22/24 1540          Pressors:    Autonomic Drugs (From admission, onward)      None          Hyperglycemia/Diabetes Medications:   Antihyperglycemics (From admission, onward)      None            ASSESSMENT and PLAN    ID  * Bilateral cellulitis of lower leg  Antibiotics per primary and ID      Endocrine  Adrenal insufficiency  Prior concerns for abnormal ACTH stimulation test in 2023.  Most likely degree of cortisol elevation with testing was normal in the setting of hypoalbuminemia at the time.  Has been on once nightly hydrocortisone dosing but compliance is unknown.  Also on Florinef but may have been added in setting of orthostatic hypotension concerns.     Had prior 8 am cortisol levels outpatient but unknown if these were in the setting of HC use of if doses were held prior to labs.    Plan:  - Held hydrocortisone last night  - ACTH stimulation testing this morning   - If results not concerning then HC will need to be removed from her home medication list    - No immediate concerns for primary adrenal insuffiencey which would be the only endocrine related indication to use Florinef.  Will have more evidence of AI rule out vs confirmation today when ACTH stim results return.  If concerns for orthostatic hypotension then Florinef can be helpful vs Midodrine, usually Florinef dose is higher in that  setting.  Defer to primary team and outside providers on it's use.  Chronic pain medication use with pain pump could be playing a role in lower BP's      Hypothyroidism (acquired)  Long standing hypothyroidism.  Issues over the years with medication compliance.  The degree of TSH elevation and mildly decreased Free T4 level matches more of a pattern of intermittent use of Levothyroxine.  Patient confirms compliance issues and chart review shows refills have been filled 78% of the time.      No concerns based on labs and symptoms to suggest myxedema coma or other related concerns.  No IV levothyroxine is needed in this instance and home dosing should be continued.      Plan:  - Continue Levothyroxine 150 mcg once daily (close to weight based dosing)  - At discharge compliance is encouraged with use of a pill box daily  - Will need TSH labs set up following her endocrine appointment in early July 2024      Other  Lymphedema of both lower extremities  Chronic with now concerns for cellulitis.  Management per primary and now ID following for antibiotics.      Frequent falls  Presented with recurrent falls  Ongoing management of other related conditions per primary  Cellulitis diagnosed at this time  Issues with continued pain and lower blood pressures        Betito Andrade, DO  Endocrinology     no

## 2024-10-07 NOTE — H&P ADULT - NSHPREVIEWOFSYSTEMS_GEN_ALL_CORE
CONSTITUTIONAL: denies fever, chills, diaphoresis, fatigue, weakness, dizziness, lightheadedness  HEENT: denies blurred vision, sore throat, cough  SKIN: denies new lesions, rash, hives, itching  CARDIOVASCULAR: denies chest pain, chest pressure, palpitations  RESPIRATORY: denies shortness of breath, QUINN, wheezing, sputum production  GASTROINTESTINAL: denies nausea, vomiting, diarrhea, constipation, abdominal pain, hematochezia, melena  GENITOURINARY: denies dysuria, polyuria, hematuria, discharge  NEUROLOGICAL: denies syncope, LOC, numbness, headache, focal weakness  MUSCULOSKELETAL: + right hip and lower back pain radiating down posterior leg   HEMATOLOGIC: denies gross bleeding, bruising  LYMPHATICS: denies enlarged lymph nodes, extremity swelling  PSYCHIATRIC: denies recent changes in anxiety, depression  ENDOCRINOLOGIC: denies sweating, cold or heat intolerance

## 2024-10-07 NOTE — PHYSICAL THERAPY INITIAL EVALUATION ADULT - PERTINENT HX OF CURRENT PROBLEM, REHAB EVAL
89 y.o. F with PMHx of Afib not on ac, HTN, dementia (A&Ox3) presents to the ED s/p fall. Patient states that she was at the refrigerator when she dropped a bottle. As patient was bending down to  the bottle, she slipped and fell backwards onto wooden floor her right side. Reports that she was unable to pick herself back up, so called for help using her medical alert necklace. Patient states that she was on the floor for less than half hour before she was helped up. Denies hitting her head or neck, and loss of consciousness. States that she was alert during and after the fall, denies being on anticoagulation. At the time of interview, patient states that she continues to have dull achy 10/10 pain of her right lower back radiating to posterior leg and right groin. She states that the pain worsens with movement or laying down on her right side. 89 y.o. F with PMHx of Afib not on ac, HTN, dementia (A&Ox3) presents to the ED s/p fall. Patient states that she was at the refrigerator when she dropped a bottle. As patient was bending down to  the bottle, she slipped and fell backwards onto wooden floor her right side. Reports that she was unable to pick herself back up, so called for help using her medical alert necklace. Patient states that she was on the floor for less than half hour before she was helped up. Denies hitting her head or neck, and loss of consciousness. States that she was alert during and after the fall, denies being on anticoagulation. At the time of interview, patient states that she continues to have dull achy 10/10 pain of her right lower back radiating to posterior leg and right groin. She states that the pain worsens with movement or laying down on her right side.  CT Scan of Hip: Right-sided acute pelvic fracture.

## 2024-10-07 NOTE — CHART NOTE - NSCHARTNOTEFT_GEN_A_CORE
Official CT pelvis read reviewed- demonstrating R sup/inf pubic rami fx and R sacral ala fx as well as possible bladder injury  Pt should be partial WB on the RLE  Will continue to monitor for changes in exam- spine exam below  Recommend uro/nephro eval for possible bladder injury  Will discuss w Dr Morejon and update w any changes    Exam:    General: NAD    Spine:    No palpable step off. Nontender to palpation.     Motor:                   C5                C6              C7               C8           T1   R            5/5                5/5            5/5             5/5          5/5  L             5/5               5/5             5/5             5/5          5/5                L2             L3             L4               L5            S1  R         ****           5/5          5/5             5/5           5/5  L          5/5          5/5           5/5             5/5           5/5    ****pt refuses to fire R L2 secondary to pain    Sensory:            C5         C6         C7      C8       T1        (0=absent, 1=impaired, 2=normal, NT=not testable)  R         2            2           2        2         2  L          2            2           2        2         2               L2          L3         L4      L5       S1         (0=absent, 1=impaired, 2=normal, NT=not testable)  R         2            2            2        2        2  L          2            2           2        2         2    UMNs:    Babinski (-) B/L  Clonus (-) B/L  Plaza (-) B/L Official CT pelvis read reviewed- demonstrating R sup/inf pubic rami fx and R sacral ala fx as well as possible bladder injury  WBAT RLE  Will continue to monitor for changes in exam- spine exam below  Recommend uro/nephro eval for possible bladder injury  Will discuss w Dr Morejon and update w any changes    Exam:    General: NAD    Spine:    No palpable step off. Nontender to palpation.     Motor:                   C5                C6              C7               C8           T1   R            5/5                5/5            5/5             5/5          5/5  L             5/5               5/5             5/5             5/5          5/5                L2             L3             L4               L5            S1  R         ****           5/5          5/5             5/5           5/5  L          5/5          5/5           5/5             5/5           5/5    ****pt refuses to fire R L2 secondary to pain    Sensory:            C5         C6         C7      C8       T1        (0=absent, 1=impaired, 2=normal, NT=not testable)  R         2            2           2        2         2  L          2            2           2        2         2               L2          L3         L4      L5       S1         (0=absent, 1=impaired, 2=normal, NT=not testable)  R         2            2            2        2        2  L          2            2           2        2         2    UMNs:    Babinski (-) B/L  Clonus (-) B/L  Plaza (-) B/L

## 2024-10-07 NOTE — H&P ADULT - NSHPSOCIALHISTORY_GEN_ALL_CORE
Tobacco: denies  EtOH: denies  Recreational drug use: denies  Lives with: patient lives at [facility].   Ambulates: with walker   ADLs: dependent of health aides at [facility]

## 2024-10-07 NOTE — H&P ADULT - PROBLEM SELECTOR PLAN 1
Patient presents s/p mechanical fall. Found with right sacral ala comminuted fracture, right-sided superior and inferior rami comminuted displaced fractures on CT Pelvis.   - Also with pelvic sidewall hematoma. Small amount of presumed blood abutting the bladder. Injury to the bladder cannot be excluded.   - CT head, cervical and lumbar spine negative    - Tylenol 650mg PO q6h for mild pain, morphine 0.5mg q6h for moderate pain, morphine 1.0mg q6h for severe pain   - Fall precautions  - PT eval  - Social work eval  - Ortho consulted - no surgical intervention indicated, WBAT

## 2024-10-07 NOTE — H&P ADULT - PROBLEM SELECTOR PLAN 6
DVT PPX: SCDs, hold chemical ac due to small pelvic sidewall hematoma and small amount of presumed blood abutting bladder on CT Pelvis. Consider repeat CT scan to eval for evolution of hematoma, if stable can start ac.

## 2024-10-07 NOTE — H&P ADULT - ASSESSMENT
89 y.o. F with PMHx of Afib not on ac, HTN, dementia (A&Ox3) presents to the ED s/p fall. Admitted for right pelvic fractures.

## 2024-10-07 NOTE — PHYSICAL THERAPY INITIAL EVALUATION ADULT - GAIT TRAINING, PT EVAL
Patient will ambulate >30 feet within 3-5 sessions to allow patient to ambulate household distances.

## 2024-10-07 NOTE — CARE COORDINATION ASSESSMENT. - NSPASTMEDSURGHISTORY_GEN_ALL_CORE_FT
PAST MEDICAL & SURGICAL HISTORY:  Hypertension      Dementia      No significant past surgical history      AF (atrial fibrillation)

## 2024-10-07 NOTE — PROGRESS NOTE ADULT - PROBLEM SELECTOR PLAN 6
DVT PPX: SCDs, hold chemical ac due to small pelvic sidewall hematoma and small amount of presumed blood abutting bladder on CT Pelvis. Consider repeat CT scan to eval for evolution of hematoma.

## 2024-10-07 NOTE — PATIENT PROFILE ADULT - FALL HARM RISK - HARM RISK INTERVENTIONS

## 2024-10-07 NOTE — ED ADULT NURSE REASSESSMENT NOTE - NS ED NURSE REASSESS COMMENT FT1
morphine given with CAROLA Mckeon RN at bedside, due to unable to get pyxis access from pharmacy at this time.

## 2024-10-07 NOTE — PATIENT PROFILE ADULT - STATED REASON FOR ADMISSION
Per Dr Gonzalez, I called pt to schedule a f/u appointment   Pt moved to [facility] and has found another psychiatrist  fell at home and hurt my right side

## 2024-10-07 NOTE — PHYSICAL THERAPY INITIAL EVALUATION ADULT - DID THE PATIENT HAVE SURGERY?
Relevant Problems   No relevant active problems       Anesthetic History   No history of anesthetic complications            Review of Systems / Medical History  Patient summary reviewed, nursing notes reviewed and pertinent labs reviewed    Pulmonary  Within defined limits                 Neuro/Psych   Within defined limits           Cardiovascular  Within defined limits                Exercise tolerance: >4 METS     GI/Hepatic/Renal  Within defined limits              Endo/Other  Within defined limits           Other Findings              Physical Exam    Airway  Mallampati: I  TM Distance: > 6 cm  Neck ROM: normal range of motion   Mouth opening: Normal     Cardiovascular  Regular rate and rhythm,  S1 and S2 normal,  no murmur, click, rub, or gallop  Rhythm: regular  Rate: normal         Dental  No notable dental hx       Pulmonary  Breath sounds clear to auscultation               Abdominal  GI exam deferred       Other Findings            Anesthetic Plan    ASA: 1  Anesthesia type: general          Induction: Intravenous  Anesthetic plan and risks discussed with: Patient, Mother and Parent / Doins Taylor n/a

## 2024-10-07 NOTE — CONSULT NOTE ADULT - SUBJECTIVE AND OBJECTIVE BOX
HPI:  This is a 89 y/o F seen in the ED for right superior/inferior pubic rami Fx s/p fall    PAST MEDICAL HISTORY:  PAST MEDICAL & SURGICAL HISTORY:  Hypertension      Dementia      No significant past surgical history          PAST SURGICAL HISTORY:    REVIEW OF SYSTEMS:  General/Constitutional: No acute distress, no headache, weakness, fevers, or chills   HEENT: Denies auditory or visual changes/disturbances, no vertigo, no throat pain, no dysphagia    Neck: Denies neck pain/stiffness, denies swelling/lumps/hoarseness   Lymphatic: Denies lumps or swelling in the axillae, groin, or neck bilaterally   Respiratory: Denies cough/hemoptysis, denies wheezing/SOB/dyspnea  Cardiac: Denies chest pain, palpitations  Abdomen: Denies abdominal bloating/fullness, nausea or vomiting, denies abdominal pain  Genitourinary: Denies urinary issues or complaints, denies dysuria/hematuria  Neuro: Denies weakness, paraesthesias, paralysis, syncope.  Skin: Denies pruritus, pain, rashes  Psych: Denies hallucinations, visual disturbances, or depression    MEDICATIONS:  Home Medications:  Preparation H Hydrocortisone 1% topical cream: Apply topically to affected area once a day, As Needed (26 Sep 2023 10:14)    MEDICATIONS  (STANDING):  morphine  - Injectable 2 milliGRAM(s) IV Push Once    MEDICATIONS  (PRN):      ALLERGIES:  Allergies    Allergy Status Unknown  No Known Allergies    Intolerances        SOCIAL HISTORY:  Social History:    Smoking: Yes [ ]  No [X]   ______pk yrs  ETOH  Yes [ ]  No [X]  Social [ ]  DRUGS:  Yes [ ]  No [X]  if so what______________    FAMILY HISTORY:  FAMILY HISTORY:  FHx: hypertension (Mother)    FHx: diabetes mellitus (Mother)        VITAL SIGNS:  Vital Signs Last 24 Hrs  T(C): 36.7 (07 Oct 2024 00:06), Max: 36.7 (06 Oct 2024 19:52)  T(F): 98 (07 Oct 2024 00:06), Max: 98 (06 Oct 2024 19:52)  HR: 80 (07 Oct 2024 00:06) (72 - 80)  BP: 146/81 (07 Oct 2024 00:06) (138/61 - 146/81)  BP(mean): --  RR: 18 (07 Oct 2024 00:06) (16 - 18)  SpO2: 97% (07 Oct 2024 00:06) (97% - 97%)    Parameters below as of 07 Oct 2024 00:06  Patient On (Oxygen Delivery Method): room air        PHYSICAL EXAM:  AAO x3. No acute distress, appears comfortable, well-groomed, appears stated age  Palpable pedal pulses bilaterally  Sensory intact throughout bilat LE's   Bilat LE motor intact, 5/5 strength  Decreased but Satisfactory ROM right hip    RADIOLOGIC STUDIES: CT pelvis reveals right superior/inferior pubic rami Fx's.     ASSESSMENT:  right superior/inferior pubic rami Fx    PLAN:  -d/c home from ortho standpoint although may need admission to medicine for rehab placement if unable to ambulate or WB due to pain  -WB as tolerated  -non-operative  -PT consult

## 2024-10-07 NOTE — PHYSICAL THERAPY INITIAL EVALUATION ADULT - BED MOBILITY TRAINING, PT EVAL
Patient will improve supine <-> sit with minAx1 within 3-5 sessions in order to safely get in and out of bed.

## 2024-10-07 NOTE — H&P ADULT - PROBLEM SELECTOR PLAN 4
Patient with hx of Afib, not currently on anticoagulation.   - EKG showing sinus rhythm with occasional PVCs at 80 bpm   - Continue home Amiodarone and metoprolol tartrate

## 2024-10-07 NOTE — PROGRESS NOTE ADULT - PROBLEM SELECTOR PLAN 1
Patient presents s/p mechanical fall. Found with right sacral ala comminuted fracture, right-sided superior and inferior rami comminuted displaced fractures on CT Pelvis.   - Also with pelvic sidewall hematoma. Small amount of presumed blood abutting the bladder. Injury to the bladder cannot be excluded- will check US kidneys and bladder to evaluate for injury, if concern for injury- will need Urology consult. Patient incontinent of urine- wears diapers at [facility]  - CT head, cervical and lumbar spine negative    - Tylenol 650mg PO q6h for mild pain, tramadol 25mg q6h for moderate pain, tramadol 50mg q6h for severe pain (family requesting to avoid IV morphine)   - Fall precautions  - PT eval- recommending [facility]  - Social work eval  - Ortho consulted - no surgical intervention indicated, WBAT Patient presents s/p mechanical fall. Found with right sacral ala comminuted fracture, right-sided superior and inferior rami comminuted displaced fractures on CT Pelvis.   - Also with pelvic sidewall hematoma. Small amount of presumed blood abutting the bladder. Injury to the bladder cannot be excluded- will check US kidneys and bladder to evaluate for injury, will also call Urology consult (Surgery PA- Dr. Vega consulted). Patient incontinent of urine- wears diapers at [facility]  - CT head, cervical and lumbar spine negative    - Tylenol 650mg PO q6h for mild pain, tramadol 25mg q6h for moderate pain, tramadol 50mg q6h for severe pain (family requesting to avoid IV morphine)   - Fall precautions  - PT eval- recommending [facility]  - Social work eval  - Ortho consulted - no surgical intervention indicated, WBAT

## 2024-10-07 NOTE — CARE COORDINATION ASSESSMENT. - OTHER PERTINENT REFERRAL INFORMATION
Sw met with Pt at bedside and spoke with son Indra Via phone secondary to pts dementia. Pt lives at [facility] [phone] and uses a walker at baseline. Pt has help from the staff at the facility for her ADLS. Pts son is aware that pt is pening PT eval for possible [facility] placment due hip fx. pt has a hx of Reab at the facility. PCP: Dk Matthews [phone] [place]: Chem [phone]

## 2024-10-07 NOTE — PATIENT PROFILE ADULT - VISION (WITH CORRECTIVE LENSES IF THE PATIENT USUALLY WEARS THEM):
Glasses/Partially impaired: cannot see medication labels or newsprint, but can see obstacles in path, and the surrounding layout; can count fingers at arm's length Referred To Plastics For Closure Text (Leave Blank If You Do Not Want): After obtaining clear surgical margins the patient was referred to plastics for surgical repair.  The patient understands they will receive post-surgical care and follow-up from the referring physician's office.

## 2024-10-07 NOTE — ED ADULT NURSE REASSESSMENT NOTE - NS ED NURSE REASSESS COMMENT FT1
spoke with hospitalists, pt stating no relief of pain, also c/o nausea and reflux.  as per Hospitalist will give message to day team during change of shift/report.

## 2024-10-07 NOTE — CARE COORDINATION ASSESSMENT. - NSCAREPROVIDERS_GEN_ALL_CORE_FT
CARE PROVIDERS:  Accepting Physician: Christiano Rivera  Administration: Alisa Short  Administration: Yasir Dutton  Administration: Latoya Williamson  Admitting: Christiano Rivera  Attending: Christiano Rivera  Consultant: Barbara Hearn  Consultant: Ruben Doyle  Covering Team: Kaveh Alvarez  Covering Team: Betty Erwin  ED ACP: Dennis Turk  ED Attending: Stacey Porter  ED Attending2: Main Mckeon  ED Nurse: Cindy Shannon  Nurse: Cindy Shannon  Nurse: Chapito Villegas  Nurse: Deb Crump  Ordered: Anibal Cobb  Ordered: Anibal Cobb  PCA/Nursing Assistant: Feli San  PCA/Nursing Assistant: Steffi Cabral  PCA/Nursing Assistant: Elizabeth Bell  PCA/Nursing Assistant: Magali Tavera  Physical Therapy: Alis Moffett  Physical Therapy: Dieter Waldron  Primary Team: Christiano Rivera  Primary Team: Yasir Oliver  Primary Team: Main Mckeon  Primary Team: Dennis Turk  Primary Team: Stacey Porter  Respiratory Therapy: Kusum Dillard  Respiratory Therapy: Isidro Rosas  Respiratory Therapy: Stevenson Howard  : Britt Jones  UR// Supp. Assoc.: Samaria Trujillo  UR// Supp. Assoc.: Winnie Rojas

## 2024-10-07 NOTE — PHYSICAL THERAPY INITIAL EVALUATION ADULT - TRANSFER TRAINING, PT EVAL
Patient will improve sit <-> stand with minAx1 3-5 sessions in order for patient to safely get in and out of chair

## 2024-10-07 NOTE — ED ADULT NURSE REASSESSMENT NOTE - NS ED NURSE REASSESS COMMENT FT1
Patient ringing the bell requesting for pain medication and was given to patient cosigned morphine 1 mg to patient IV; explained to patient previously that her nurse was with a critical patient; rings the call bell even when the nurse just left the room

## 2024-10-07 NOTE — H&P ADULT - PROBLEM SELECTOR PLAN 2
Leukocytosis 21.64 with neutrophil predominance. Afebrile. HDS.   - Lactate   - F/u UA  - F/u BCx and UCx   - Monitor for abx Leukocytosis 21.64 with neutrophil predominance. Afebrile. HDS.   - Lactate   - F/u UA  - F/u BCx and UCx   - Trend WBC and monitor for fever   - Monitor off abx Leukocytosis 21.64 with neutrophil predominance. Afebrile. HDS.   - F/u Lactate level    - F/u UA  - F/u BCx and UCx   - Trend WBC and monitor for fever   - Monitor off abx

## 2024-10-07 NOTE — CONSULT NOTE ADULT - ASSESSMENT
s/p fall with right hip pain.    On exam, +log roll on right, +TA, EHL, GA. SILT throughout. Able to range bilateral UE and -log oll on LLE.    On imaging, pelvis radiographs and CT with right minimally displaced superior/inferior rami fractures and sacral ala.    s/p right minimally displaced superior/inferior rami fractures and sacral ala. Will mange with closed management  *PT/OT - WBAT  *Pain control - minimize narcotics  *Dispo - pending    Ronan Morejon MD  Orthopaedics

## 2024-10-07 NOTE — SOCIAL WORK PROGRESS NOTE - NSSWPROGRESSNOTE_GEN_ALL_CORE
Sw was consulted for pelvix fx and possible EVERT placement. Pt is pending PT for recommendations. Sw will cont to follow for safe DC planning.

## 2024-10-07 NOTE — CONSULT NOTE ADULT - ASSESSMENT
89 year old female with PMH of Afib (no AC), HTN, dementia, [place], admitted s/p mechanical fall with multiple Right-sided pelvic fractures, found to have a pelvic sidewall hematoma abutting the bladder on CT.

## 2024-10-08 LAB
ANION GAP SERPL CALC-SCNC: 12 MMOL/L — SIGNIFICANT CHANGE UP (ref 5–17)
BUN SERPL-MCNC: 12 MG/DL — SIGNIFICANT CHANGE UP (ref 7–23)
CALCIUM SERPL-MCNC: 8.6 MG/DL — SIGNIFICANT CHANGE UP (ref 8.5–10.1)
CHLORIDE SERPL-SCNC: 95 MMOL/L — LOW (ref 96–108)
CO2 SERPL-SCNC: 19 MMOL/L — LOW (ref 22–31)
CREAT SERPL-MCNC: 0.69 MG/DL — SIGNIFICANT CHANGE UP (ref 0.5–1.3)
EGFR: 83 ML/MIN/1.73M2 — SIGNIFICANT CHANGE UP
GLUCOSE SERPL-MCNC: 97 MG/DL — SIGNIFICANT CHANGE UP (ref 70–99)
HCT VFR BLD CALC: 32.6 % — LOW (ref 34.5–45)
HGB BLD-MCNC: 11.4 G/DL — LOW (ref 11.5–15.5)
MCHC RBC-ENTMCNC: 32.5 PG — SIGNIFICANT CHANGE UP (ref 27–34)
MCHC RBC-ENTMCNC: 35 GM/DL — SIGNIFICANT CHANGE UP (ref 32–36)
MCV RBC AUTO: 92.9 FL — SIGNIFICANT CHANGE UP (ref 80–100)
NRBC # BLD: 0 /100 WBCS — SIGNIFICANT CHANGE UP (ref 0–0)
PLATELET # BLD AUTO: 201 K/UL — SIGNIFICANT CHANGE UP (ref 150–400)
POTASSIUM SERPL-MCNC: 3.9 MMOL/L — SIGNIFICANT CHANGE UP (ref 3.5–5.3)
POTASSIUM SERPL-SCNC: 3.9 MMOL/L — SIGNIFICANT CHANGE UP (ref 3.5–5.3)
RBC # BLD: 3.51 M/UL — LOW (ref 3.8–5.2)
RBC # FLD: 13.2 % — SIGNIFICANT CHANGE UP (ref 10.3–14.5)
SODIUM SERPL-SCNC: 126 MMOL/L — LOW (ref 135–145)
WBC # BLD: 16.36 K/UL — HIGH (ref 3.8–10.5)
WBC # FLD AUTO: 16.36 K/UL — HIGH (ref 3.8–10.5)

## 2024-10-08 PROCEDURE — 99231 SBSQ HOSP IP/OBS SF/LOW 25: CPT

## 2024-10-08 PROCEDURE — 74176 CT ABD & PELVIS W/O CONTRAST: CPT | Mod: 26

## 2024-10-08 PROCEDURE — 99233 SBSQ HOSP IP/OBS HIGH 50: CPT

## 2024-10-08 RX ADMIN — Medication 7.5 MILLIGRAM(S): at 18:17

## 2024-10-08 RX ADMIN — Medication 650 MILLIGRAM(S): at 18:24

## 2024-10-08 RX ADMIN — Medication 25 MILLIGRAM(S): at 18:17

## 2024-10-08 RX ADMIN — Medication 7.5 MILLIGRAM(S): at 06:31

## 2024-10-08 RX ADMIN — TRAMADOL HYDROCHLORIDE 25 MILLIGRAM(S): 50 TABLET, COATED ORAL at 01:45

## 2024-10-08 RX ADMIN — TRAMADOL HYDROCHLORIDE 25 MILLIGRAM(S): 50 TABLET, COATED ORAL at 02:45

## 2024-10-08 RX ADMIN — Medication 650 MILLIGRAM(S): at 19:32

## 2024-10-08 NOTE — PROGRESS NOTE ADULT - PROBLEM SELECTOR PLAN 1
Patient presents s/p mechanical fall. Found with right sacral ala comminuted fracture, right-sided superior and inferior rami comminuted displaced fractures on CT Pelvis.   - Also with pelvic sidewall hematoma. Small amount of presumed blood abutting the bladder. Injury to the bladder cannot be excluded.   - CT head, cervical and lumbar spine negative    - Continue pain control regimen  - Fall precautions  - PT eval - EVERT  - Ortho consulted - no surgical intervention indicated, WBAT  - Discussed with uro team - plan for CT cystogram to r/o underlying bladder trauma although lower suspicion

## 2024-10-08 NOTE — SOCIAL WORK PROGRESS NOTE - NSSWPROGRESSNOTE_GEN_ALL_CORE
Spoke with patients son to discuss PT recommendation. He believes the patient is feeling better and will do better with PT.The patient is from [facility]. Discussed benefit of dual planning. Patient has been to rehab over a year ago. Agreed to accept list of rehabs via Email to [email or web address]

## 2024-10-08 NOTE — CASE MANAGEMENT PROGRESS NOTE - NSCMPROGRESSNOTE_GEN_ALL_CORE
CM consult for R pelvic fx, possible EVERT noted and reviewed. CM will continue to collaborate with interdisciplinary team, remain available to assist and monitor for home care needs.

## 2024-10-08 NOTE — CASE MANAGEMENT PROGRESS NOTE - NSCMPROGRESSNOTE_GEN_ALL_CORE
Discussed pt on rounds, pt remains acute, awaiting further testing, pending CT cystogram, SW following for EVERT placement. CM will continue to collaborate with interdisciplinary team and remain available to assist.

## 2024-10-08 NOTE — PROGRESS NOTE ADULT - PROBLEM SELECTOR PLAN 6
DVT PPX: SCDs, hold chemical ac due to small pelvic sidewall hematoma and small amount of presumed blood abutting bladder on CT Pelvis. DVT PPX: SCDs, hold chemical ac due to small pelvic sidewall hematoma and small amount of presumed blood abutting bladder on CT Pelvis.    #hyponatremia  -Could be related to above  -renal consult  -follow up AM BMP

## 2024-10-09 DIAGNOSIS — E87.1 HYPO-OSMOLALITY AND HYPONATREMIA: ICD-10-CM

## 2024-10-09 LAB
ANION GAP SERPL CALC-SCNC: 8 MMOL/L — SIGNIFICANT CHANGE UP (ref 5–17)
BUN SERPL-MCNC: 17 MG/DL — SIGNIFICANT CHANGE UP (ref 7–23)
CALCIUM SERPL-MCNC: 8.7 MG/DL — SIGNIFICANT CHANGE UP (ref 8.5–10.1)
CHLORIDE SERPL-SCNC: 98 MMOL/L — SIGNIFICANT CHANGE UP (ref 96–108)
CO2 SERPL-SCNC: 22 MMOL/L — SIGNIFICANT CHANGE UP (ref 22–31)
CREAT SERPL-MCNC: 0.6 MG/DL — SIGNIFICANT CHANGE UP (ref 0.5–1.3)
EGFR: 86 ML/MIN/1.73M2 — SIGNIFICANT CHANGE UP
GLUCOSE SERPL-MCNC: 82 MG/DL — SIGNIFICANT CHANGE UP (ref 70–99)
HCT VFR BLD CALC: 31.3 % — LOW (ref 34.5–45)
HGB BLD-MCNC: 10.9 G/DL — LOW (ref 11.5–15.5)
MCHC RBC-ENTMCNC: 32.4 PG — SIGNIFICANT CHANGE UP (ref 27–34)
MCHC RBC-ENTMCNC: 34.8 GM/DL — SIGNIFICANT CHANGE UP (ref 32–36)
MCV RBC AUTO: 93.2 FL — SIGNIFICANT CHANGE UP (ref 80–100)
NRBC # BLD: 0 /100 WBCS — SIGNIFICANT CHANGE UP (ref 0–0)
PLATELET # BLD AUTO: 179 K/UL — SIGNIFICANT CHANGE UP (ref 150–400)
POTASSIUM SERPL-MCNC: 3.5 MMOL/L — SIGNIFICANT CHANGE UP (ref 3.5–5.3)
POTASSIUM SERPL-SCNC: 3.5 MMOL/L — SIGNIFICANT CHANGE UP (ref 3.5–5.3)
RBC # BLD: 3.36 M/UL — LOW (ref 3.8–5.2)
RBC # FLD: 13.3 % — SIGNIFICANT CHANGE UP (ref 10.3–14.5)
SODIUM SERPL-SCNC: 128 MMOL/L — LOW (ref 135–145)
WBC # BLD: 12.41 K/UL — HIGH (ref 3.8–10.5)
WBC # FLD AUTO: 12.41 K/UL — HIGH (ref 3.8–10.5)

## 2024-10-09 PROCEDURE — 99233 SBSQ HOSP IP/OBS HIGH 50: CPT

## 2024-10-09 RX ORDER — ACETAMINOPHEN 325 MG
1000 TABLET ORAL EVERY 8 HOURS
Refills: 0 | Status: DISCONTINUED | OUTPATIENT
Start: 2024-10-09 | End: 2024-10-14

## 2024-10-09 RX ORDER — ENOXAPARIN SODIUM 150 MG/ML
40 INJECTION SUBCUTANEOUS EVERY 24 HOURS
Refills: 0 | Status: DISCONTINUED | OUTPATIENT
Start: 2024-10-09 | End: 2024-10-14

## 2024-10-09 RX ORDER — SODIUM CHLORIDE 0.9 % (FLUSH) 0.9 %
2 SYRINGE (ML) INJECTION ONCE
Refills: 0 | Status: COMPLETED | OUTPATIENT
Start: 2024-10-09 | End: 2024-10-09

## 2024-10-09 RX ORDER — OXYCODONE HYDROCHLORIDE 30 MG/1
2.5 TABLET, FILM COATED, EXTENDED RELEASE ORAL EVERY 8 HOURS
Refills: 0 | Status: DISCONTINUED | OUTPATIENT
Start: 2024-10-09 | End: 2024-10-14

## 2024-10-09 RX ORDER — GABAPENTIN 800 MG/1
100 TABLET, FILM COATED ORAL THREE TIMES A DAY
Refills: 0 | Status: DISCONTINUED | OUTPATIENT
Start: 2024-10-09 | End: 2024-10-14

## 2024-10-09 RX ADMIN — ENOXAPARIN SODIUM 40 MILLIGRAM(S): 150 INJECTION SUBCUTANEOUS at 18:03

## 2024-10-09 RX ADMIN — Medication 650 MILLIGRAM(S): at 10:44

## 2024-10-09 RX ADMIN — Medication 2 GRAM(S): at 18:03

## 2024-10-09 RX ADMIN — Medication 650 MILLIGRAM(S): at 00:05

## 2024-10-09 RX ADMIN — TRAMADOL HYDROCHLORIDE 50 MILLIGRAM(S): 50 TABLET, COATED ORAL at 13:21

## 2024-10-09 RX ADMIN — Medication 20 MILLIEQUIVALENT(S): at 18:03

## 2024-10-09 RX ADMIN — GABAPENTIN 100 MILLIGRAM(S): 800 TABLET, FILM COATED ORAL at 21:28

## 2024-10-09 RX ADMIN — Medication 650 MILLIGRAM(S): at 11:30

## 2024-10-09 RX ADMIN — Medication 25 MILLIGRAM(S): at 18:03

## 2024-10-09 RX ADMIN — Medication 7.5 MILLIGRAM(S): at 18:03

## 2024-10-09 RX ADMIN — Medication 650 MILLIGRAM(S): at 15:58

## 2024-10-09 RX ADMIN — TRAMADOL HYDROCHLORIDE 50 MILLIGRAM(S): 50 TABLET, COATED ORAL at 21:28

## 2024-10-09 RX ADMIN — Medication 7.5 MILLIGRAM(S): at 06:13

## 2024-10-09 RX ADMIN — AMIODARONE HYDROCHLORIDE 100 MILLIGRAM(S): 50 INJECTION, SOLUTION INTRAVENOUS at 06:14

## 2024-10-09 NOTE — PROGRESS NOTE ADULT - NSPROGADDITIONALINFOA_GEN_ALL_CORE
plan of care discussed with son, Joshua, at bedside and over phone I will START or STAY ON the medications listed below when I get home from the hospital:    aspirin 81 mg oral delayed release tablet  -- 1 tab(s) by mouth once a day  -- Indication: For Cardioprotective    Crestor 5 mg oral tablet  -- 1 tab(s) by mouth once a day (at bedtime)  -- Indication: For Cardioprotective    Valtrex 500 mg oral tablet  --  by mouth once a day  -- Indication: For [place] PPX    Flexeril  -- 10 milligram(s) by mouth 3 times a day  -- Indication: For Neck muscle spasm    latanoprost 0.005% ophthalmic solution  -- 1 drop(s) to each affected eye once a day (in the evening)  -- Indication: For eye protection    omeprazole 20 mg oral delayed release capsule  -- 1 cap(s) by mouth once a day  -- Indication: For GERD (gastroesophageal reflux disease)

## 2024-10-09 NOTE — CONSULT NOTE ADULT - SUBJECTIVE AND OBJECTIVE BOX
Patient is a 89y old  Female who presents with a chief complaint of Right superior/inferior pubic rami fracture (08 Oct 2024 10:21)    HPI:  89 y.o. F with PMHx of Afib not on ac, HTN, dementia (A&Ox3) presents to the ED s/p fall. Patient states that she was at the refrigerator when she dropped a bottle. As patient was bending down to  the bottle, she slipped and fell backwards onto wooden floor her right side. Reports that she was unable to pick herself back up, so called for help using her medical alert necklace. Patient states that she was on the floor for less than half hour before she was helped up. Denies hitting her head or neck, and loss of consciousness. States that she was alert during and after the fall, denies being on anticoagulation. At the time of interview, patient states that she continues to have dull achy 10/10 pain of her right lower back radiating to posterior leg and right groin. She states that the pain worsens with movement or laying down on her right side.     ED course:  Vitals: BP: 146/81, HR: 80bpm, Temp: 98F, RR: 18, SpO2: 97% on RA  Labs significant for: WBC 21.64, Gluc 137   UA: ordered   EKG: Sinus rhythm with occasional PVCs at 80bpm   In ED given: Tylenol 650mg PO x1, morphine 2mg IVP x2, lidocaine 4% patch x1,     Imaging  CT HEAD: No acute intracranial hemorrhage, mass effect, or osseous fracture.    CT LUMBAR SPINE: No acute lumbar spine fracture or traumatic malalignment.    CT CERVICAL SPINE: No acute cervical spine fracture or traumatic malalignment.    CT Pelvis: prelim report  1.   Right-sided acute pelvic fractures.  2.   Pelvic sidewall hematoma. Small amount of presumed blood abutting thebladder. Injury to the bladder cannot be excluded. Recommend clinical correlation and follow-up.   (07 Oct 2024 02:35)      PAST MEDICAL HISTORY:  HTN (hypertension)    Hypertension    Dementia    AF (atrial fibrillation)        PAST SURGICAL HISTORY:  No significant past surgical history        FAMILY HISTORY:  FHx: hypertension (Mother)    FHx: diabetes mellitus (Mother)        SOCIAL HISTORY:    Allergies    Allergy Status Unknown  No Known Allergies    Intolerances      Home Medications:  amiodarone 100 mg oral tablet: 1 tab(s) orally once a day (07 Oct 2024 02:24)  busPIRone 7.5 mg oral tablet: 1 tab(s) orally 2 times a day (07 Oct 2024 02:25)  polyethylene glycol 3350 oral powder for reconstitution: 17 gram(s) orally every other day (07 Oct 2024 02:27)  Preparation H Hydrocortisone 1% topical cream: Apply topically to affected area once a day, As Needed (07 Oct 2024 02:28)    MEDICATIONS  (STANDING):  aMIOdarone    Tablet 100 milliGRAM(s) Oral daily  amLODIPine   Tablet 5 milliGRAM(s) Oral daily  busPIRone 7.5 milliGRAM(s) Oral two times a day  metoprolol tartrate 25 milliGRAM(s) Oral two times a day  ondansetron Injectable 4 milliGRAM(s) IV Push once    MEDICATIONS  (PRN):  acetaminophen     Tablet .. 650 milliGRAM(s) Oral every 6 hours PRN Temp greater or equal to 38C (100.4F), Mild Pain (1 - 3)  hydrocortisone 2.5% Rectal Cream 1 Application(s) Rectal daily PRN Rectal pain  traMADol 50 milliGRAM(s) Oral every 6 hours PRN Severe Pain (7 - 10)  traMADol 25 milliGRAM(s) Oral every 6 hours PRN Moderate Pain (4 - 6)      REVIEW OF SYSTEMS:  General:   Respiratory: No cough, SOB  Cardiovascular: No CP or Palpitations	  Gastrointestinal: No nausea, Vomiting. No diarrhea  Genitourinary: No urinary complaints	  Musculoskeletal: No leg swelling, No new rash or lesions	  Neurological: 	  all other systems negative    T(F): 97.6 (10-09-24 @ 05:04), Max: 97.9 (10-08-24 @ 17:30)  HR: 67 (10-09-24 @ 05:04) (67 - 73)  BP: 104/67 (10-09-24 @ 05:04) (104/67 - 128/69)  RR: 18 (10-09-24 @ 05:04) (17 - 18)  SpO2: 95% (10-09-24 @ 05:04) (92% - 95%)  Wt(kg): --    PHYSICAL EXAM:  General: NAD  Respiratory: b/l air entry  Cardiovascular: S1 S2  Gastrointestinal: soft  Extremities: edema        10-09    128[L]  |  98  |  17  ----------------------------<  82  3.5   |  22  |  0.60    Ca    8.7      09 Oct 2024 07:51                            10.9   12.41 )-----------( 179      ( 09 Oct 2024 07:51 )             31.3       Hemoglobin: 10.9 g/dL (10-09 @ 07:51)  Hematocrit: 31.3 % (10-09 @ 07:51)  Potassium: 3.5 mmol/L (10-09 @ 07:51)  Blood Urea Nitrogen: 17 mg/dL (10-09 @ 07:51)      Creatinine, Serum: 0.60 (10-09 @ 07:51)  Creatinine, Serum: 0.69 (10-08 @ 11:37)  Creatinine, Serum: 0.71 (10-07 @ 06:40)  Creatinine, Serum: 0.93 (10-07 @ 00:54)      Urinalysis Basic - ( 09 Oct 2024 07:51 )    Color: x / Appearance: x / SG: x / pH: x  Gluc: 82 mg/dL / Ketone: x  / Bili: x / Urobili: x   Blood: x / Protein: x / Nitrite: x   Leuk Esterase: x / RBC: x / WBC x   Sq Epi: x / Non Sq Epi: x / Bacteria: x                    I&O's Detail    08 Oct 2024 07:01  -  09 Oct 2024 07:00  --------------------------------------------------------  IN:    Oral Fluid: 420 mL  Total IN: 420 mL    OUT:    Indwelling Catheter - Urethral (mL): 500 mL  Total OUT: 500 mL    Total NET: -80 mL            Urinalysis with Rflx Culture (collected 07 Oct 2024 19:00)    Culture - Blood (collected 07 Oct 2024 01:40)  Source: .Blood BLOOD  Preliminary Report (09 Oct 2024 09:01):    No growth at 48 Hours    Culture - Blood (collected 07 Oct 2024 01:35)  Source: .Blood BLOOD  Preliminary Report (09 Oct 2024 09:01):    No growth at 48 Hours         Patient is a 89y old  Female who presents with a chief complaint of Right superior/inferior pubic rami fracture (08 Oct 2024 10:21)    HPI:  89 y.o. F with PMHx of Afib not on ac, HTN, dementia (A&Ox3) presents to the ED s/p fall. Patient states that she was at the refrigerator when she dropped a bottle. As patient was bending down to  the bottle, she slipped and fell backwards onto wooden floor her right side. Reports that she was unable to pick herself back up, so called for help using her medical alert necklace. Patient states that she was on the floor for less than half hour before she was helped up. Denies hitting her head or neck, and loss of consciousness. States that she was alert during and after the fall, denies being on anticoagulation. At the time of interview, patient states that she continues to have dull achy 10/10 pain of her right lower back radiating to posterior leg and right groin. She states that the pain worsens with movement or laying down on her right side.     ED course:  Vitals: BP: 146/81, HR: 80bpm, Temp: 98F, RR: 18, SpO2: 97% on RA  Labs significant for: WBC 21.64, Gluc 137   UA: ordered   EKG: Sinus rhythm with occasional PVCs at 80bpm   In ED given: Tylenol 650mg PO x1, morphine 2mg IVP x2, lidocaine 4% patch x1,     Imaging  CT HEAD: No acute intracranial hemorrhage, mass effect, or osseous fracture.    CT LUMBAR SPINE: No acute lumbar spine fracture or traumatic malalignment.    CT CERVICAL SPINE: No acute cervical spine fracture or traumatic malalignment.    CT Pelvis: prelim report  1.   Right-sided acute pelvic fractures.  2.   Pelvic sidewall hematoma. Small amount of presumed blood abutting thebladder. Injury to the bladder cannot be excluded. Recommend clinical correlation and follow-up.   (07 Oct 2024 02:35)    Renal consult called for hyponatremia. History obtained from chart and patient.       PAST MEDICAL HISTORY:  HTN (hypertension)    Hypertension    Dementia    AF (atrial fibrillation)        PAST SURGICAL HISTORY:  No significant past surgical history        FAMILY HISTORY:  FHx: hypertension (Mother)    FHx: diabetes mellitus (Mother)        SOCIAL HISTORY: No smoking or alcohol use     Allergies    Allergy Status Unknown  No Known Allergies    Intolerances      Home Medications:  amiodarone 100 mg oral tablet: 1 tab(s) orally once a day (07 Oct 2024 02:24)  busPIRone 7.5 mg oral tablet: 1 tab(s) orally 2 times a day (07 Oct 2024 02:25)  polyethylene glycol 3350 oral powder for reconstitution: 17 gram(s) orally every other day (07 Oct 2024 02:27)  Preparation H Hydrocortisone 1% topical cream: Apply topically to affected area once a day, As Needed (07 Oct 2024 02:28)    MEDICATIONS  (STANDING):  aMIOdarone    Tablet 100 milliGRAM(s) Oral daily  amLODIPine   Tablet 5 milliGRAM(s) Oral daily  busPIRone 7.5 milliGRAM(s) Oral two times a day  metoprolol tartrate 25 milliGRAM(s) Oral two times a day  ondansetron Injectable 4 milliGRAM(s) IV Push once    MEDICATIONS  (PRN):  acetaminophen     Tablet .. 650 milliGRAM(s) Oral every 6 hours PRN Temp greater or equal to 38C (100.4F), Mild Pain (1 - 3)  hydrocortisone 2.5% Rectal Cream 1 Application(s) Rectal daily PRN Rectal pain  traMADol 50 milliGRAM(s) Oral every 6 hours PRN Severe Pain (7 - 10)  traMADol 25 milliGRAM(s) Oral every 6 hours PRN Moderate Pain (4 - 6)      REVIEW OF SYSTEMS:  General: no distress  Respiratory: No cough, SOB  Cardiovascular: No CP or Palpitations	  Gastrointestinal: No nausea, Vomiting. No diarrhea  Genitourinary: No urinary complaints	  Musculoskeletal: No new rash or lesions	  all other systems negative    T(F): 97.6 (10-09-24 @ 05:04), Max: 97.9 (10-08-24 @ 17:30)  HR: 67 (10-09-24 @ 05:04) (67 - 73)  BP: 104/67 (10-09-24 @ 05:04) (104/67 - 128/69)  RR: 18 (10-09-24 @ 05:04) (17 - 18)  SpO2: 95% (10-09-24 @ 05:04) (92% - 95%)  Wt(kg): --    PHYSICAL EXAM:  General: NAD  Respiratory: b/l air entry  Cardiovascular: S1 S2  Gastrointestinal: soft  Extremities: no edema        10-09    128[L]  |  98  |  17  ----------------------------<  82  3.5   |  22  |  0.60    Ca    8.7      09 Oct 2024 07:51                            10.9   12.41 )-----------( 179      ( 09 Oct 2024 07:51 )             31.3       Hemoglobin: 10.9 g/dL (10-09 @ 07:51)  Hematocrit: 31.3 % (10-09 @ 07:51)  Potassium: 3.5 mmol/L (10-09 @ 07:51)  Blood Urea Nitrogen: 17 mg/dL (10-09 @ 07:51)      Creatinine, Serum: 0.60 (10-09 @ 07:51)  Creatinine, Serum: 0.69 (10-08 @ 11:37)  Creatinine, Serum: 0.71 (10-07 @ 06:40)  Creatinine, Serum: 0.93 (10-07 @ 00:54)      Urinalysis Basic - ( 09 Oct 2024 07:51 )    Color: x / Appearance: x / SG: x / pH: x  Gluc: 82 mg/dL / Ketone: x  / Bili: x / Urobili: x   Blood: x / Protein: x / Nitrite: x   Leuk Esterase: x / RBC: x / WBC x   Sq Epi: x / Non Sq Epi: x / Bacteria: x                    I&O's Detail    08 Oct 2024 07:01  -  09 Oct 2024 07:00  --------------------------------------------------------  IN:    Oral Fluid: 420 mL  Total IN: 420 mL    OUT:    Indwelling Catheter - Urethral (mL): 500 mL  Total OUT: 500 mL    Total NET: -80 mL            Urinalysis with Rflx Culture (collected 07 Oct 2024 19:00)    Culture - Blood (collected 07 Oct 2024 01:40)  Source: .Blood BLOOD  Preliminary Report (09 Oct 2024 09:01):    No growth at 48 Hours    Culture - Blood (collected 07 Oct 2024 01:35)  Source: .Blood BLOOD  Preliminary Report (09 Oct 2024 09:01):    No growth at 48 Hours    < from: CT Abdomen and Pelvis Cystogram w/ Catheter (10.08.24 @ 12:37) >    ACC: [id number] EXAM:  CT ABD PELV CYSTOGRAMEXISTCATH   ORDERED BY: JOHANN SHAH     PROCEDURE DATE:  10/08/2024          INTERPRETATION:  CLINICAL INFORMATION: 89 years  Female with bladder   trauma ?no need for cath.    COMPARISON: CT bony pelvis 10/6/2024    CONTRAST/COMPLICATIONS:  IV Contrast: NONE  50 cc administered   50 cc discarded  Oral Contrast: NONE  Complications: None reported at time of study completion    PROCEDURE:  CT of the Pelvis was performed (CT Cystogram).  Precontrast images were obtained through the pelvis followed by imaging   after the instillation of a dilute mixture of [id number] via a Johnson   catheter. Post void images were obtained.  Sagittal and coronal reformats were performed.    FINDINGS:  BLADDER: Johnson catheter in situ. No extraluminal contrast extravasation.  REPRODUCTIVE ORGANS: 1.4 cm calcified fibroid.  LYMPH NODES: No pelvic lymphadenopathy.    VISUALIZED PORTIONS:  ABDOMINAL ORGANS: Within normal limits.  BOWEL: Moderate stool burden. Normal appendix. Mild perirectal edema.  PERITONEUM/RETROPERITONEUM: Right pelvic sidewall fluid has resolved.  VESSELS: Atherosclerotic changes.  ABDOMINAL WALL: Within normal limits.  BONES: Redemonstrated comminuted right sacral ala fracture. Comminuted  right superior and inferior pubic ramus fractures. Degenerative changes   of the spine, hips and sacroiliac joints.    IMPRESSION:  No contrast extravasation from the urinary bladder.    Mild perirectal edema likely related to recent pelvic fractures or to   proctitis.    Improving right pelvic sidewall fluid.    Right sacral and right pubic ramus fractures redemonstrated. The right   inferior pubic ramus fracture is again displaced.        --- End of Report ---            DRE VELOZ MD; Attending Radiologist  This document has been electronically signed. Oct  8 2024  1:01PM    < end of copied text >  < from: US Kidney and Bladder (10.07.24 @ 18:23) >    ACC: [id number] EXAM:  US KIDNEYS AND BLADDER   ORDERED BY: DAJUAN JENNINGS     PROCEDURE DATE:  10/07/2024          INTERPRETATION:  CLINICAL INFORMATION: Follow-up pelvic sidewall hematoma    COMPARISON: CT 10/6/2024    TECHNIQUE: Sonography of the kidneys and bladder. Exam limited by patient   cooperativity.    FINDINGS:  Right kidney: 8.3cm. No renal mass, hydronephrosis or calculi.    Left kidney: 9.2cm. No renal mass, hydronephrosis or calculi.    Urinary bladder: Within normal limits. RIGHT pelvic sidewall hematoma   observed on CT is occult on ultrasound    IMPRESSION:  RIGHT pelvic sidewall hematoma observed on CT is occult on ultrasound        --- End of Report ---            ALESSIA BUSTOS MD; Attending Radiologist  This document has been electronically signed. Oct  7 2024  7:10PM    < end of copied text >

## 2024-10-09 NOTE — CONSULT NOTE ADULT - SUBJECTIVE AND OBJECTIVE BOX
Physical Medicine and Rehabilitation Initial Evaluation    Patients acute care records reviewed and are summarized as follows:     Patient is a 89y Female who is admitted to acute care for a fall during which the patient sustained fracture of the sacrum and well as the pelvis. Patient is referred for pain control, rehabilitation and disposition recs.    Radiological studies reviewed, including results of CT scan of pelvis.    Medical studies/laboratory studies reviewed, including patient is renal function which is within normal limits. Medications need not be dose adjusted for renal compromise.    The patient was seen and examined at bedside. complains of pain in the pelvis as well as the lower back, no bowel or bladder function changes. The radiation of the pain, no numbness or tingling or other associated symptoms.    ROS:  Constitutional: Denies fevers or chills  MSK: low back and pelvic pain as above    PAST MEDICAL & SURGICAL HISTORY:  Hypertension  Dementia  AF (atrial fibrillation)    Medications: reviewed and revised  acetaminophen     Tablet .. 1000 milliGRAM(s) Oral every 8 hours  aMIOdarone    Tablet 100 milliGRAM(s) Oral daily  amLODIPine   Tablet 5 milliGRAM(s) Oral daily  busPIRone 7.5 milliGRAM(s) Oral two times a day  enoxaparin Injectable 40 milliGRAM(s) SubCutaneous every 24 hours  gabapentin 100 milliGRAM(s) Oral three times a day  hydrocortisone 2.5% Rectal Cream 1 Application(s) Rectal daily PRN  metoprolol tartrate 25 milliGRAM(s) Oral two times a day  ondansetron Injectable 4 milliGRAM(s) IV Push once  oxyCODONE    IR 2.5 milliGRAM(s) Oral every 8 hours  sodium chloride 2 Gram(s) Oral every 6 hours      Physical Exam:   Vitals reviewed     Constitutional: Gen: In no acute distress, cooperative with exam and questioning   Neuro: Sensation intact to light touch in peripheral upper and lower extremities  TTP sacral region medially, full passive range of motion in all extremities, strength at least antigravity in all limbs, negative clonus and down going Babinski  Psychiatric: Awake alert oriented to self and place

## 2024-10-09 NOTE — PROGRESS NOTE ADULT - PROBLEM SELECTOR PLAN 1
Patient presents s/p mechanical fall. Found with right sacral ala comminuted fracture, right-sided superior and inferior rami comminuted displaced fractures on CT Pelvis.   - Also with pelvic sidewall hematoma. Small amount of presumed blood abutting the bladder. Injury to the bladder cannot be excluded.   - CT head, cervical and lumbar spine negative    - Continue pain control regimen  - Fall precautions  - PT eval - EVERT  - Ortho consulted - no surgical intervention indicated, WBAT  - Discussed with uro team - CT cystogram without evidence of bladder injury- no urological intervention at this time  - Will trial gabapentin 100mg TID for burning pain in B/L feet

## 2024-10-09 NOTE — PATIENT CHOICE NOTE. - NSPTCHOICESTATE_GEN_ALL_CORE

## 2024-10-09 NOTE — SOCIAL WORK PROGRESS NOTE - NSSWPROGRESSNOTE_GEN_ALL_CORE
Met with son in the hospital . Provided him with a list of rehabs and requested choices in preference order.Discussed that the [facility] has declined to take patient back without extra hired help. [facility] has called him.  Spoke to son later who states he has not made any choices but is waiting for MD to call him but will be looking at a facility this evening.  Educated him on dc planning process. faxed to local facilities.

## 2024-10-09 NOTE — CONSULT NOTE ADULT - ASSESSMENT
Hyponatremia, likely SIADH  s/p Fall, pelvis fracture  Hypertension  Hypokalemia    Check urine studies. PO fluid restriction. PO salt tabs x 1 dose. Trend BP and titrate BP meds as needed. Ortho follow up. Pain management.   PRN Potassium supplementation. Encourage PO intake as tolerated.

## 2024-10-09 NOTE — SOCIAL WORK PROGRESS NOTE - NSSWPROGRESSNOTE_GEN_ALL_CORE
Called son  to discuss discharge planning .  Physical Therapy is still recommending subacute rehab. Son still is hopeful that [facility] will take patient back. JOSELUIS has called [facility] and left a message. The son states he did not receive Emailed list prior. JOSELUIS resent email list of rehab  which the son has received. Educated him on choosing a rehab via [facility] ( Medicare.gov ) Patient will need a three night stay prior to dc . Patient could be ready for DC 10/10.  Reeducated son on Discharge planning process

## 2024-10-09 NOTE — CONSULT NOTE ADULT - REASON FOR ADMISSION
Right superior/inferior pubic rami fracture

## 2024-10-09 NOTE — CONSULT NOTE ADULT - ASSESSMENT
Full note to follow    Revised regimen to include standing orders of tylenol 1000mg Q8H and oxycodone 2.5mg Q8H for the time being  May scale upwards if patient remains limited in therapy due to pain A/P 89y year old Female with fall with subsequent pelvic fracture and sacral alar fx with comminution, pain due to fx, ambulatory dysfunction and functional decline    Revised regimen to include standing orders of tylenol 1000mg Q8H and oxycodone 2.5mg Q8H for the time being  Discontinued tramadol  May scale oxycodone upwards if patient remains limited in therapy due to pain  Therapy notes reviewed, patient a good candidate for subacute rehabilitation, will require physical and occupational therapy to address functional deficits demonstrated on review of therapy session notes.    Bed Mobility  Bed Mobility Training Rehab Potential: good, to achieve stated therapy goals  Bed Mobility Training Symptoms Noted During/After Treatment: fatigue  Bed Mobility Training Sit-to-Supine: moderate assist (50% patient effort);  1 person assist  Bed Mobility Training Supine-to-Sit: 1 person assist;  moderate assist (50% patient effort)  Bed Mobility Training Limitations: impaired balance;  decreased strength;  pain    Sit-Stand Transfer Training  Sit-to-Stand Transfer Training Rehab Potential: good, to achieve stated therapy goals  Sit-to-Stand Transfer Training Symptoms Noted During/After Treatment: fatigue  Transfer Training Sit-to-Stand Transfer: moderate assist (50% patient effort);  1 person assist;  weight-bearing as tolerated   rolling walker  Transfer Training Stand-to-Sit Transfer: rolling walker;  weight-bearing as tolerated   1 person assist;  moderate assist (50% patient effort)  Sit-to-Stand Transfer Training Transfer Safety Analysis: decreased strength;  impaired balance;  pain    Gait Training  Gait Training Rehab Potential: good, to achieve stated therapy goals  Gait Training Symptoms Noted During/After Treatment: fatigue;  increased pain  Gait Training: moderate assist (50% patient effort);  1 person assist;  weight-bearing as tolerated   rolling walker;  5 feet  Gait Analysis: decreased strength;  impaired balance;  pain    Primary team notes are reviewed  Therapy notes are reviewed  Laboratory studies reviewed including those mentioned earlier/above  Discussed management/coordinated care with primary team/referring provider.  High risk of morbidity from treatment with: schedule two narcotic pain medication    55 minutes spent during patient encounter:    ¦ preparing to see the patient   ¦ performing a medically appropriate examination and/or evaluation   ¦ counseling and educating the patient/family/caregiver   ¦ ordering medications, tests, or procedures   ¦ referring and communicating with other health care professionals  ¦ documenting clinical information in the electronic or other health record   ¦ care coordination with primary team nursing staff

## 2024-10-10 LAB
ANION GAP SERPL CALC-SCNC: 7 MMOL/L — SIGNIFICANT CHANGE UP (ref 5–17)
BUN SERPL-MCNC: 18 MG/DL — SIGNIFICANT CHANGE UP (ref 7–23)
CALCIUM SERPL-MCNC: 8.7 MG/DL — SIGNIFICANT CHANGE UP (ref 8.5–10.1)
CHLORIDE SERPL-SCNC: 100 MMOL/L — SIGNIFICANT CHANGE UP (ref 96–108)
CO2 SERPL-SCNC: 20 MMOL/L — LOW (ref 22–31)
CREAT SERPL-MCNC: 0.66 MG/DL — SIGNIFICANT CHANGE UP (ref 0.5–1.3)
EGFR: 84 ML/MIN/1.73M2 — SIGNIFICANT CHANGE UP
GLUCOSE SERPL-MCNC: 78 MG/DL — SIGNIFICANT CHANGE UP (ref 70–99)
HCT VFR BLD CALC: 33.7 % — LOW (ref 34.5–45)
HGB BLD-MCNC: 11.3 G/DL — LOW (ref 11.5–15.5)
MCHC RBC-ENTMCNC: 32.3 PG — SIGNIFICANT CHANGE UP (ref 27–34)
MCHC RBC-ENTMCNC: 33.5 GM/DL — SIGNIFICANT CHANGE UP (ref 32–36)
MCV RBC AUTO: 96.3 FL — SIGNIFICANT CHANGE UP (ref 80–100)
NRBC # BLD: 0 /100 WBCS — SIGNIFICANT CHANGE UP (ref 0–0)
OSMOLALITY UR: 482 MOSM/KG — SIGNIFICANT CHANGE UP (ref 50–1200)
PLATELET # BLD AUTO: 184 K/UL — SIGNIFICANT CHANGE UP (ref 150–400)
POTASSIUM SERPL-MCNC: 4.1 MMOL/L — SIGNIFICANT CHANGE UP (ref 3.5–5.3)
POTASSIUM SERPL-SCNC: 4.1 MMOL/L — SIGNIFICANT CHANGE UP (ref 3.5–5.3)
RBC # BLD: 3.5 M/UL — LOW (ref 3.8–5.2)
RBC # FLD: 13.7 % — SIGNIFICANT CHANGE UP (ref 10.3–14.5)
SODIUM SERPL-SCNC: 127 MMOL/L — LOW (ref 135–145)
SODIUM UR-SCNC: <20 MMOL/L — SIGNIFICANT CHANGE UP
WBC # BLD: 10.15 K/UL — SIGNIFICANT CHANGE UP (ref 3.8–10.5)
WBC # FLD AUTO: 10.15 K/UL — SIGNIFICANT CHANGE UP (ref 3.8–10.5)

## 2024-10-10 PROCEDURE — 99232 SBSQ HOSP IP/OBS MODERATE 35: CPT

## 2024-10-10 RX ORDER — SODIUM CHLORIDE 0.9 % (FLUSH) 0.9 %
2 SYRINGE (ML) INJECTION EVERY 6 HOURS
Refills: 0 | Status: COMPLETED | OUTPATIENT
Start: 2024-10-10 | End: 2024-10-10

## 2024-10-10 RX ADMIN — Medication 7.5 MILLIGRAM(S): at 05:45

## 2024-10-10 RX ADMIN — Medication 1000 MILLIGRAM(S): at 21:58

## 2024-10-10 RX ADMIN — Medication 1000 MILLIGRAM(S): at 06:50

## 2024-10-10 RX ADMIN — OXYCODONE HYDROCHLORIDE 2.5 MILLIGRAM(S): 30 TABLET, FILM COATED, EXTENDED RELEASE ORAL at 13:33

## 2024-10-10 RX ADMIN — Medication 1000 MILLIGRAM(S): at 14:33

## 2024-10-10 RX ADMIN — Medication 25 MILLIGRAM(S): at 05:45

## 2024-10-10 RX ADMIN — ENOXAPARIN SODIUM 40 MILLIGRAM(S): 150 INJECTION SUBCUTANEOUS at 17:13

## 2024-10-10 RX ADMIN — Medication 25 MILLIGRAM(S): at 17:12

## 2024-10-10 RX ADMIN — Medication 1000 MILLIGRAM(S): at 05:44

## 2024-10-10 RX ADMIN — OXYCODONE HYDROCHLORIDE 2.5 MILLIGRAM(S): 30 TABLET, FILM COATED, EXTENDED RELEASE ORAL at 05:51

## 2024-10-10 RX ADMIN — Medication 7.5 MILLIGRAM(S): at 17:12

## 2024-10-10 RX ADMIN — Medication 2 GRAM(S): at 13:50

## 2024-10-10 RX ADMIN — Medication 5 MILLIGRAM(S): at 05:44

## 2024-10-10 RX ADMIN — GABAPENTIN 100 MILLIGRAM(S): 800 TABLET, FILM COATED ORAL at 21:58

## 2024-10-10 RX ADMIN — Medication 1000 MILLIGRAM(S): at 13:33

## 2024-10-10 RX ADMIN — OXYCODONE HYDROCHLORIDE 2.5 MILLIGRAM(S): 30 TABLET, FILM COATED, EXTENDED RELEASE ORAL at 06:50

## 2024-10-10 RX ADMIN — Medication 2 GRAM(S): at 18:56

## 2024-10-10 RX ADMIN — OXYCODONE HYDROCHLORIDE 2.5 MILLIGRAM(S): 30 TABLET, FILM COATED, EXTENDED RELEASE ORAL at 14:33

## 2024-10-10 RX ADMIN — OXYCODONE HYDROCHLORIDE 2.5 MILLIGRAM(S): 30 TABLET, FILM COATED, EXTENDED RELEASE ORAL at 21:58

## 2024-10-10 RX ADMIN — AMIODARONE HYDROCHLORIDE 100 MILLIGRAM(S): 50 INJECTION, SOLUTION INTRAVENOUS at 05:45

## 2024-10-10 RX ADMIN — GABAPENTIN 100 MILLIGRAM(S): 800 TABLET, FILM COATED ORAL at 05:47

## 2024-10-10 NOTE — PROGRESS NOTE ADULT - PROBLEM SELECTOR PLAN 1
Patient presents s/p mechanical fall. Found with right sacral ala comminuted fracture, right-sided superior and inferior rami comminuted displaced fractures on CT Pelvis.   - Also with pelvic sidewall hematoma. Small amount of presumed blood abutting the bladder. Injury to the bladder cannot be excluded.   - CT head, cervical and lumbar spine negative    - Continue pain control regimen  - Fall precautions  - PT eval - [facility]  - Ortho consulted - no surgical intervention indicated, WBAT  - Discussed with uro team - CT cystogram without evidence of bladder injury- no urological intervention at this time  - Will trial gabapentin 100mg TID for burning pain in B/L feet  - Pain management consulted- appreciate recs

## 2024-10-11 LAB
ANION GAP SERPL CALC-SCNC: 10 MMOL/L — SIGNIFICANT CHANGE UP (ref 5–17)
BUN SERPL-MCNC: 18 MG/DL — SIGNIFICANT CHANGE UP (ref 7–23)
CALCIUM SERPL-MCNC: 8.3 MG/DL — LOW (ref 8.5–10.1)
CHLORIDE SERPL-SCNC: 102 MMOL/L — SIGNIFICANT CHANGE UP (ref 96–108)
CO2 SERPL-SCNC: 22 MMOL/L — SIGNIFICANT CHANGE UP (ref 22–31)
CREAT SERPL-MCNC: 0.76 MG/DL — SIGNIFICANT CHANGE UP (ref 0.5–1.3)
EGFR: 75 ML/MIN/1.73M2 — SIGNIFICANT CHANGE UP
GLUCOSE SERPL-MCNC: 91 MG/DL — SIGNIFICANT CHANGE UP (ref 70–99)
HCT VFR BLD CALC: 32 % — LOW (ref 34.5–45)
HGB BLD-MCNC: 10.9 G/DL — LOW (ref 11.5–15.5)
MCHC RBC-ENTMCNC: 32.5 PG — SIGNIFICANT CHANGE UP (ref 27–34)
MCHC RBC-ENTMCNC: 34.1 GM/DL — SIGNIFICANT CHANGE UP (ref 32–36)
MCV RBC AUTO: 95.5 FL — SIGNIFICANT CHANGE UP (ref 80–100)
NRBC # BLD: 0 /100 WBCS — SIGNIFICANT CHANGE UP (ref 0–0)
PLATELET # BLD AUTO: 220 K/UL — SIGNIFICANT CHANGE UP (ref 150–400)
POTASSIUM SERPL-MCNC: 3.8 MMOL/L — SIGNIFICANT CHANGE UP (ref 3.5–5.3)
POTASSIUM SERPL-SCNC: 3.8 MMOL/L — SIGNIFICANT CHANGE UP (ref 3.5–5.3)
RBC # BLD: 3.35 M/UL — LOW (ref 3.8–5.2)
RBC # FLD: 13.5 % — SIGNIFICANT CHANGE UP (ref 10.3–14.5)
SODIUM SERPL-SCNC: 134 MMOL/L — LOW (ref 135–145)
WBC # BLD: 10.33 K/UL — SIGNIFICANT CHANGE UP (ref 3.8–10.5)
WBC # FLD AUTO: 10.33 K/UL — SIGNIFICANT CHANGE UP (ref 3.8–10.5)

## 2024-10-11 PROCEDURE — 99232 SBSQ HOSP IP/OBS MODERATE 35: CPT

## 2024-10-11 RX ORDER — MORPHINE SULFATE 30 MG/1
1 TABLET, FILM COATED, EXTENDED RELEASE ORAL ONCE
Refills: 0 | Status: DISCONTINUED | OUTPATIENT
Start: 2024-10-11 | End: 2024-10-12

## 2024-10-11 RX ADMIN — Medication 1000 MILLIGRAM(S): at 14:11

## 2024-10-11 RX ADMIN — Medication 5 MILLIGRAM(S): at 05:52

## 2024-10-11 RX ADMIN — OXYCODONE HYDROCHLORIDE 2.5 MILLIGRAM(S): 30 TABLET, FILM COATED, EXTENDED RELEASE ORAL at 05:43

## 2024-10-11 RX ADMIN — GABAPENTIN 100 MILLIGRAM(S): 800 TABLET, FILM COATED ORAL at 05:44

## 2024-10-11 RX ADMIN — Medication 1000 MILLIGRAM(S): at 05:52

## 2024-10-11 RX ADMIN — OXYCODONE HYDROCHLORIDE 2.5 MILLIGRAM(S): 30 TABLET, FILM COATED, EXTENDED RELEASE ORAL at 21:35

## 2024-10-11 RX ADMIN — AMIODARONE HYDROCHLORIDE 100 MILLIGRAM(S): 50 INJECTION, SOLUTION INTRAVENOUS at 05:46

## 2024-10-11 RX ADMIN — OXYCODONE HYDROCHLORIDE 2.5 MILLIGRAM(S): 30 TABLET, FILM COATED, EXTENDED RELEASE ORAL at 14:11

## 2024-10-11 RX ADMIN — ENOXAPARIN SODIUM 40 MILLIGRAM(S): 150 INJECTION SUBCUTANEOUS at 17:55

## 2024-10-11 RX ADMIN — GABAPENTIN 100 MILLIGRAM(S): 800 TABLET, FILM COATED ORAL at 14:11

## 2024-10-11 RX ADMIN — Medication 25 MILLIGRAM(S): at 05:53

## 2024-10-11 RX ADMIN — Medication 25 MILLIGRAM(S): at 17:54

## 2024-10-11 RX ADMIN — Medication 7.5 MILLIGRAM(S): at 05:46

## 2024-10-11 RX ADMIN — Medication 7.5 MILLIGRAM(S): at 17:55

## 2024-10-11 RX ADMIN — Medication 1000 MILLIGRAM(S): at 21:35

## 2024-10-11 RX ADMIN — GABAPENTIN 100 MILLIGRAM(S): 800 TABLET, FILM COATED ORAL at 21:35

## 2024-10-11 NOTE — PROGRESS NOTE ADULT - PROBLEM SELECTOR PLAN 1
Patient presents s/p mechanical fall. Found with right sacral ala comminuted fracture, right-sided superior and inferior rami comminuted displaced fractures on CT Pelvis.   - Also with pelvic sidewall hematoma. Small amount of presumed blood abutting the bladder. Injury to the bladder cannot be excluded.   - CT head, cervical and lumbar spine negative    - Continue pain control regimen  - Fall precautions  - PT eval - [facility]  - Ortho consulted - no surgical intervention indicated, WBAT  - Discussed with uro team - CT cystogram without evidence of bladder injury- no urological intervention at this time  - Will trial gabapentin 100mg TID for burning pain in B/L feet  - Pain management consulted- appreciate recs  -Pt is medically stable for DC to [facility]. son has decided about [facility] place today. SW is aware. possible DC sunday or Monday.

## 2024-10-11 NOTE — SOCIAL WORK PROGRESS NOTE - NSSWPROGRESSNOTE_GEN_ALL_CORE
Left message for son patient will tentatively be medically ready for discharge . Patient to be discharged to [place] on Sunday at 12:00 via [facility]

## 2024-10-11 NOTE — SOCIAL WORK PROGRESS NOTE - NSSWPROGRESSNOTE_GEN_ALL_CORE
Spoke with patient's son . He was educated on Im letter and his right to appeal discharge . He has provided one choice. Seth has been medically accepted to [facility] subacute rehab

## 2024-10-11 NOTE — PROGRESS NOTE ADULT - TIME BILLING
Reviewing chart notes and data, reviewing telemetry monitor records, face to face time counseling the patient, coordinating care with SW/CM at [facility].
Reviewing chart notes and data, face to face time counseling the patient, coordinating care with SW/CM at [facility].
I personally conducted a physical examination of the patient. I personally gathered the patient's history. listed resident physician. I personally discussed the plan of care with the patient. The questions and concerns were addressed to the best of my ability. The patient is in agreement with the listed treatment plan.
Reviewing chart notes and data, face to face time counseling the patient, coordinating care with SW/CM at [facility].
Reviewing chart notes and data, face to face time counseling the patient, coordinating care with SW/CM at [facility].

## 2024-10-12 ENCOUNTER — TRANSCRIPTION ENCOUNTER (OUTPATIENT)
Age: 89
End: 2024-10-12

## 2024-10-12 LAB
ANION GAP SERPL CALC-SCNC: 7 MMOL/L — SIGNIFICANT CHANGE UP (ref 5–17)
BUN SERPL-MCNC: 18 MG/DL — SIGNIFICANT CHANGE UP (ref 7–23)
CALCIUM SERPL-MCNC: 8.4 MG/DL — LOW (ref 8.5–10.1)
CHLORIDE SERPL-SCNC: 103 MMOL/L — SIGNIFICANT CHANGE UP (ref 96–108)
CO2 SERPL-SCNC: 26 MMOL/L — SIGNIFICANT CHANGE UP (ref 22–31)
CREAT SERPL-MCNC: 0.76 MG/DL — SIGNIFICANT CHANGE UP (ref 0.5–1.3)
CULTURE RESULTS: SIGNIFICANT CHANGE UP
CULTURE RESULTS: SIGNIFICANT CHANGE UP
EGFR: 75 ML/MIN/1.73M2 — SIGNIFICANT CHANGE UP
GLUCOSE SERPL-MCNC: 87 MG/DL — SIGNIFICANT CHANGE UP (ref 70–99)
HCT VFR BLD CALC: 32.4 % — LOW (ref 34.5–45)
HGB BLD-MCNC: 10.9 G/DL — LOW (ref 11.5–15.5)
MCHC RBC-ENTMCNC: 32.2 PG — SIGNIFICANT CHANGE UP (ref 27–34)
MCHC RBC-ENTMCNC: 33.6 GM/DL — SIGNIFICANT CHANGE UP (ref 32–36)
MCV RBC AUTO: 95.9 FL — SIGNIFICANT CHANGE UP (ref 80–100)
NRBC # BLD: 0 /100 WBCS — SIGNIFICANT CHANGE UP (ref 0–0)
PLATELET # BLD AUTO: 251 K/UL — SIGNIFICANT CHANGE UP (ref 150–400)
POTASSIUM SERPL-MCNC: 3.8 MMOL/L — SIGNIFICANT CHANGE UP (ref 3.5–5.3)
POTASSIUM SERPL-SCNC: 3.8 MMOL/L — SIGNIFICANT CHANGE UP (ref 3.5–5.3)
RBC # BLD: 3.38 M/UL — LOW (ref 3.8–5.2)
RBC # FLD: 13.9 % — SIGNIFICANT CHANGE UP (ref 10.3–14.5)
SODIUM SERPL-SCNC: 136 MMOL/L — SIGNIFICANT CHANGE UP (ref 135–145)
SPECIMEN SOURCE: SIGNIFICANT CHANGE UP
SPECIMEN SOURCE: SIGNIFICANT CHANGE UP
WBC # BLD: 9.44 K/UL — SIGNIFICANT CHANGE UP (ref 3.8–10.5)
WBC # FLD AUTO: 9.44 K/UL — SIGNIFICANT CHANGE UP (ref 3.8–10.5)

## 2024-10-12 PROCEDURE — 99232 SBSQ HOSP IP/OBS MODERATE 35: CPT

## 2024-10-12 RX ORDER — ACETAMINOPHEN 325 MG
2 TABLET ORAL
Qty: 0 | Refills: 0 | DISCHARGE
Start: 2024-10-12

## 2024-10-12 RX ORDER — GABAPENTIN 800 MG/1
1 TABLET, FILM COATED ORAL
Qty: 0 | Refills: 0 | DISCHARGE
Start: 2024-10-12

## 2024-10-12 RX ORDER — OXYCODONE HYDROCHLORIDE 30 MG/1
0.5 TABLET, FILM COATED, EXTENDED RELEASE ORAL
Qty: 0 | Refills: 0 | DISCHARGE
Start: 2024-10-12

## 2024-10-12 RX ADMIN — Medication 1000 MILLIGRAM(S): at 06:40

## 2024-10-12 RX ADMIN — OXYCODONE HYDROCHLORIDE 2.5 MILLIGRAM(S): 30 TABLET, FILM COATED, EXTENDED RELEASE ORAL at 06:40

## 2024-10-12 RX ADMIN — Medication 25 MILLIGRAM(S): at 06:04

## 2024-10-12 RX ADMIN — OXYCODONE HYDROCHLORIDE 2.5 MILLIGRAM(S): 30 TABLET, FILM COATED, EXTENDED RELEASE ORAL at 06:03

## 2024-10-12 RX ADMIN — MORPHINE SULFATE 1 MILLIGRAM(S): 30 TABLET, FILM COATED, EXTENDED RELEASE ORAL at 01:00

## 2024-10-12 RX ADMIN — AMIODARONE HYDROCHLORIDE 100 MILLIGRAM(S): 50 INJECTION, SOLUTION INTRAVENOUS at 06:04

## 2024-10-12 RX ADMIN — Medication 5 MILLIGRAM(S): at 06:03

## 2024-10-12 RX ADMIN — GABAPENTIN 100 MILLIGRAM(S): 800 TABLET, FILM COATED ORAL at 06:04

## 2024-10-12 RX ADMIN — Medication 1000 MILLIGRAM(S): at 21:58

## 2024-10-12 RX ADMIN — Medication 1000 MILLIGRAM(S): at 13:32

## 2024-10-12 RX ADMIN — Medication 1000 MILLIGRAM(S): at 06:03

## 2024-10-12 RX ADMIN — OXYCODONE HYDROCHLORIDE 2.5 MILLIGRAM(S): 30 TABLET, FILM COATED, EXTENDED RELEASE ORAL at 13:32

## 2024-10-12 RX ADMIN — Medication 7.5 MILLIGRAM(S): at 06:04

## 2024-10-12 RX ADMIN — Medication 1000 MILLIGRAM(S): at 14:32

## 2024-10-12 RX ADMIN — ENOXAPARIN SODIUM 40 MILLIGRAM(S): 150 INJECTION SUBCUTANEOUS at 18:05

## 2024-10-12 RX ADMIN — GABAPENTIN 100 MILLIGRAM(S): 800 TABLET, FILM COATED ORAL at 21:58

## 2024-10-12 RX ADMIN — Medication 7.5 MILLIGRAM(S): at 18:05

## 2024-10-12 RX ADMIN — OXYCODONE HYDROCHLORIDE 2.5 MILLIGRAM(S): 30 TABLET, FILM COATED, EXTENDED RELEASE ORAL at 22:53

## 2024-10-12 RX ADMIN — MORPHINE SULFATE 1 MILLIGRAM(S): 30 TABLET, FILM COATED, EXTENDED RELEASE ORAL at 02:00

## 2024-10-12 RX ADMIN — GABAPENTIN 100 MILLIGRAM(S): 800 TABLET, FILM COATED ORAL at 13:31

## 2024-10-12 RX ADMIN — OXYCODONE HYDROCHLORIDE 2.5 MILLIGRAM(S): 30 TABLET, FILM COATED, EXTENDED RELEASE ORAL at 14:32

## 2024-10-12 NOTE — DISCHARGE NOTE PROVIDER - CARE PROVIDER_API CALL
Rivas Matthews  Hospice/Palliative Medicine  [address] [place] [address]  Phone: [phone]  Fax: [phone]  Follow Up Time:

## 2024-10-12 NOTE — DISCHARGE NOTE PROVIDER - NSDCMRMEDTOKEN_GEN_ALL_CORE_FT
acetaminophen 500 mg oral tablet: 2 tab(s) orally every 8 hours  amiodarone 100 mg oral tablet: 1 tab(s) orally once a day  amLODIPine 5 mg oral tablet: 1 tab(s) orally once a day  busPIRone 7.5 mg oral tablet: 1 tab(s) orally 2 times a day  gabapentin 100 mg oral capsule: 1 cap(s) orally 3 times a day  metoprolol tartrate 25 mg oral tablet: 1 tab(s) orally 2 times a day  polyethylene glycol 3350 oral powder for reconstitution: 17 gram(s) orally every other day  Preparation H Hydrocortisone 1% topical cream: Apply topically to affected area once a day, As Needed   acetaminophen 500 mg oral tablet: 2 tab(s) orally every 8 hours  amiodarone 100 mg oral tablet: 1 tab(s) orally once a day  amLODIPine 5 mg oral tablet: 1 tab(s) orally once a day  busPIRone 7.5 mg oral tablet: 1 tab(s) orally 2 times a day  gabapentin 100 mg oral capsule: 1 cap(s) orally 3 times a day  metoprolol tartrate 25 mg oral tablet: 1 tab(s) orally 2 times a day  oxyCODONE 5 mg oral tablet: 0.5 tab(s) orally every 8 hours  polyethylene glycol 3350 oral powder for reconstitution: 17 gram(s) orally every other day  Preparation H Hydrocortisone 1% topical cream: Apply topically to affected area once a day, As Needed

## 2024-10-12 NOTE — DISCHARGE NOTE PROVIDER - NSDCCPCAREPLAN_GEN_ALL_CORE_FT
PRINCIPAL DISCHARGE DIAGNOSIS  Diagnosis: Pelvic fracture  Assessment and Plan of Treatment: You were admitted with right sided pelvic fractures. Follow up with your PCP within 1 week of discharge.      SECONDARY DISCHARGE DIAGNOSES  Diagnosis: Afib  Assessment and Plan of Treatment: Resume home medication regimen

## 2024-10-12 NOTE — DISCHARGE NOTE PROVIDER - ATTENDING DISCHARGE PHYSICAL EXAMINATION:
Vital Signs Last 24 Hrs  T(C): 36.8 (14 Oct 2024 05:05), Max: 36.8 (14 Oct 2024 05:05)  T(F): 98.3 (14 Oct 2024 05:05), Max: 98.3 (14 Oct 2024 05:05)  HR: 79 (14 Oct 2024 05:05) (77 - 91)  BP: 123/74 (14 Oct 2024 05:05) (108/66 - 123/74)  BP(mean): --  RR: 19 (14 Oct 2024 05:05) (18 - 20)  SpO2: 93% (14 Oct 2024 05:05) (91% - 94%)    Parameters below as of 14 Oct 2024 05:05  Patient On (Oxygen Delivery Method): room air  Physical Exam:  General: well-developed, well-nourished, NAD  HEENT: normocephalic, atraumatic, EOMI, moist mucous membranes   Neck: supple, non-tender, no masses  Neurology: Awake, alert  Respiratory: clear to auscultation bilaterally; no wheezes, rhonchi, or rales  CV: regular rate and rhythm, soft S1/S2, no murmurs, rubs, or gallops  Abdominal: soft, non-tender, non-distended, bowel sounds present  Extremities: no clubbing, cyanosis, or edema; palpable peripheral pulses  Musculoskeletal: no joint erythema or warmth, no joint swelling   Skin: warm, dry, normal color

## 2024-10-12 NOTE — PHARMACOTHERAPY INTERVENTION NOTE - COMMENTS
88 yo female currently ordered for standing oxycodone 2.5 mg q8. Utilizing the age friendly 65+ report, recommended adding hold parameters for lethargy, sedation or RR < 12. Discussed with Dr. Chery and order was updated to reflect hold parameters
no

## 2024-10-12 NOTE — DISCHARGE NOTE PROVIDER - HOSPITAL COURSE
ADMISSION H+P:    HPI:  89 y.o. F with PMHx of Afib not on ac, HTN, dementia (A&Ox3) presents to the ED s/p fall. Patient states that she was at the refrigerator when she dropped a bottle. As patient was bending down to  the bottle, she slipped and fell backwards onto wooden floor her right side. Reports that she was unable to pick herself back up, so called for help using her medical alert necklace. Patient states that she was on the floor for less than half hour before she was helped up. Denies hitting her head or neck, and loss of consciousness. States that she was alert during and after the fall, denies being on anticoagulation. At the time of interview, patient states that she continues to have dull achy 10/10 pain of her right lower back radiating to posterior leg and right groin. She states that the pain worsens with movement or laying down on her right side.     ED course:  Vitals: BP: 146/81, HR: 80bpm, Temp: 98F, RR: 18, SpO2: 97% on RA  Labs significant for: WBC 21.64, Gluc 137   UA: ordered   EKG: Sinus rhythm with occasional PVCs at 80bpm   In ED given: Tylenol 650mg PO x1, morphine 2mg IVP x2, lidocaine 4% patch x1,     Imaging  CT HEAD: No acute intracranial hemorrhage, mass effect, or osseous fracture.    CT LUMBAR SPINE: No acute lumbar spine fracture or traumatic malalignment.    CT CERVICAL SPINE: No acute cervical spine fracture or traumatic malalignment.    CT Pelvis: prelim report  1.   Right-sided acute pelvic fractures.  2.   Pelvic sidewall hematoma. Small amount of presumed blood abutting thebladder. Injury to the bladder cannot be excluded. Recommend clinical correlation and follow-up.   (07 Oct 2024 02:35)      ---  HOSPITAL COURSE: Patient admitted for right pelvic fractures. Hyponatremia improved during hospital course. Ortho consulted - no surgical intervention indicated, WBAT.     Patient was medically optimized and improved clinically throughout hospital course. Patient seen and examined on day of discharge.    Vital Signs  T(C): 36.7 (12 Oct 2024 05:44), Max: 37 (11 Oct 2024 20:06)  T(F): 98 (12 Oct 2024 05:44), Max: 98.6 (11 Oct 2024 20:06)  HR: 71 (12 Oct 2024 05:44) (64 - 71)  BP: 111/65 (12 Oct 2024 05:44) (98/61 - 114/68)  RR: 20 (12 Oct 2024 05:44) (18 - 20)  SpO2: 95% (12 Oct 2024 05:44) (91% - 95%)    Physical Exam:  General: well-developed, well-nourished, NAD  HEENT: normocephalic, atraumatic, EOMI, moist mucous membranes   Neck: supple, non-tender, no masses  Neurology: Awake, alert  Respiratory: clear to auscultation bilaterally; no wheezes, rhonchi, or rales  CV: regular rate and rhythm, soft S1/S2, no murmurs, rubs, or gallops  Abdominal: soft, non-tender, non-distended, bowel sounds present  Extremities: no clubbing, cyanosis, or edema; palpable peripheral pulses  Musculoskeletal: no joint erythema or warmth, no joint swelling   Skin: warm, dry, normal color    Patient is medically stable for discharge to [facility] with outpatient follow up.  ---  CONSULTANTS:   Nephro: Dr. Hunter    ---  TIME SPENT:   I, the attending physician, was physically present for the key portions of the evaluation and management (E/M) service provided. The total amount of time spent reviewing the hospital course, laboratory values, imaging findings, assessing/counseling the patient, discussing with consultant physicians, social work, nursing staff was 35 minutes.     ---  FINAL DISCHARGE DIAGNOSIS LIST:  Please see last daily progress note for final discharge diagnoses           ADMISSION H+P:    HPI:  89 y.o. F with PMHx of Afib not on ac, HTN, dementia (A&Ox3) presents to the ED s/p fall. Patient states that she was at the refrigerator when she dropped a bottle. As patient was bending down to  the bottle, she slipped and fell backwards onto wooden floor her right side. Reports that she was unable to pick herself back up, so called for help using her medical alert necklace. Patient states that she was on the floor for less than half hour before she was helped up. Denies hitting her head or neck, and loss of consciousness. States that she was alert during and after the fall, denies being on anticoagulation. At the time of interview, patient states that she continues to have dull achy 10/10 pain of her right lower back radiating to posterior leg and right groin. She states that the pain worsens with movement or laying down on her right side.     ED course:  Vitals: BP: 146/81, HR: 80bpm, Temp: 98F, RR: 18, SpO2: 97% on RA  Labs significant for: WBC 21.64, Gluc 137   UA: ordered   EKG: Sinus rhythm with occasional PVCs at 80bpm   In ED given: Tylenol 650mg PO x1, morphine 2mg IVP x2, lidocaine 4% patch x1,     Imaging  CT HEAD: No acute intracranial hemorrhage, mass effect, or osseous fracture.    CT LUMBAR SPINE: No acute lumbar spine fracture or traumatic malalignment.    CT CERVICAL SPINE: No acute cervical spine fracture or traumatic malalignment.    CT Pelvis: prelim report  1.   Right-sided acute pelvic fractures.  2.   Pelvic sidewall hematoma. Small amount of presumed blood abutting thebladder. Injury to the bladder cannot be excluded. Recommend clinical correlation and follow-up.   (07 Oct 2024 02:35)      ---  HOSPITAL COURSE: Patient admitted for right pelvic fractures. Hyponatremia improved during hospital course. Ortho consulted - no surgical intervention indicated, WBAT.     Patient was medically optimized and improved clinically throughout hospital course. Patient seen and examined on day of discharge.    Vital Signs Last 24 Hrs  T(C): 36.8 (14 Oct 2024 05:05), Max: 36.8 (14 Oct 2024 05:05)  T(F): 98.3 (14 Oct 2024 05:05), Max: 98.3 (14 Oct 2024 05:05)  HR: 79 (14 Oct 2024 05:05) (77 - 91)  BP: 123/74 (14 Oct 2024 05:05) (108/66 - 123/74)  BP(mean): --  RR: 19 (14 Oct 2024 05:05) (18 - 20)  SpO2: 93% (14 Oct 2024 05:05) (91% - 94%)    Parameters below as of 14 Oct 2024 05:05  Patient On (Oxygen Delivery Method): room air        Physical Exam:  General: well-developed, well-nourished, NAD  HEENT: normocephalic, atraumatic, EOMI, moist mucous membranes   Neck: supple, non-tender, no masses  Neurology: Awake, alert  Respiratory: clear to auscultation bilaterally; no wheezes, rhonchi, or rales  CV: regular rate and rhythm, soft S1/S2, no murmurs, rubs, or gallops  Abdominal: soft, non-tender, non-distended, bowel sounds present  Extremities: no clubbing, cyanosis, or edema; palpable peripheral pulses  Musculoskeletal: no joint erythema or warmth, no joint swelling   Skin: warm, dry, normal color    Patient is medically stable for discharge to [facility] with outpatient follow up.  ---  CONSULTANTS:   Nephro: Dr. Hunter    ---  TIME SPENT:   I, the attending physician, was physically present for the key portions of the evaluation and management (E/M) service provided. The total amount of time spent reviewing the hospital course, laboratory values, imaging findings, assessing/counseling the patient, discussing with consultant physicians, social work, nursing staff was 35 minutes.     ---  FINAL DISCHARGE DIAGNOSIS LIST:  Please see last daily progress note for final discharge diagnoses

## 2024-10-13 LAB
ANION GAP SERPL CALC-SCNC: 12 MMOL/L — SIGNIFICANT CHANGE UP (ref 5–17)
BUN SERPL-MCNC: 19 MG/DL — SIGNIFICANT CHANGE UP (ref 7–23)
CALCIUM SERPL-MCNC: 8.3 MG/DL — LOW (ref 8.5–10.1)
CHLORIDE SERPL-SCNC: 104 MMOL/L — SIGNIFICANT CHANGE UP (ref 96–108)
CO2 SERPL-SCNC: 18 MMOL/L — LOW (ref 22–31)
CREAT SERPL-MCNC: 0.71 MG/DL — SIGNIFICANT CHANGE UP (ref 0.5–1.3)
EGFR: 82 ML/MIN/1.73M2 — SIGNIFICANT CHANGE UP
GLUCOSE SERPL-MCNC: 82 MG/DL — SIGNIFICANT CHANGE UP (ref 70–99)
HCT VFR BLD CALC: 35.6 % — SIGNIFICANT CHANGE UP (ref 34.5–45)
HGB BLD-MCNC: 11.5 G/DL — SIGNIFICANT CHANGE UP (ref 11.5–15.5)
MCHC RBC-ENTMCNC: 32.2 PG — SIGNIFICANT CHANGE UP (ref 27–34)
MCHC RBC-ENTMCNC: 32.3 GM/DL — SIGNIFICANT CHANGE UP (ref 32–36)
MCV RBC AUTO: 99.7 FL — SIGNIFICANT CHANGE UP (ref 80–100)
NRBC # BLD: 0 /100 WBCS — SIGNIFICANT CHANGE UP (ref 0–0)
PLATELET # BLD AUTO: 212 K/UL — SIGNIFICANT CHANGE UP (ref 150–400)
POTASSIUM SERPL-MCNC: 4.1 MMOL/L — SIGNIFICANT CHANGE UP (ref 3.5–5.3)
POTASSIUM SERPL-SCNC: 4.1 MMOL/L — SIGNIFICANT CHANGE UP (ref 3.5–5.3)
RBC # BLD: 3.57 M/UL — LOW (ref 3.8–5.2)
RBC # FLD: 14.1 % — SIGNIFICANT CHANGE UP (ref 10.3–14.5)
SODIUM SERPL-SCNC: 134 MMOL/L — LOW (ref 135–145)
WBC # BLD: 12.12 K/UL — HIGH (ref 3.8–10.5)
WBC # FLD AUTO: 12.12 K/UL — HIGH (ref 3.8–10.5)

## 2024-10-13 PROCEDURE — 99232 SBSQ HOSP IP/OBS MODERATE 35: CPT

## 2024-10-13 RX ORDER — ACETAMINOPHEN 325 MG
1000 TABLET ORAL ONCE
Refills: 0 | Status: COMPLETED | OUTPATIENT
Start: 2024-10-13 | End: 2024-10-13

## 2024-10-13 RX ORDER — OXYCODONE HYDROCHLORIDE 30 MG/1
2.5 TABLET, FILM COATED, EXTENDED RELEASE ORAL ONCE
Refills: 0 | Status: DISCONTINUED | OUTPATIENT
Start: 2024-10-13 | End: 2024-10-13

## 2024-10-13 RX ADMIN — OXYCODONE HYDROCHLORIDE 2.5 MILLIGRAM(S): 30 TABLET, FILM COATED, EXTENDED RELEASE ORAL at 14:08

## 2024-10-13 RX ADMIN — OXYCODONE HYDROCHLORIDE 2.5 MILLIGRAM(S): 30 TABLET, FILM COATED, EXTENDED RELEASE ORAL at 15:08

## 2024-10-13 RX ADMIN — Medication 7.5 MILLIGRAM(S): at 05:25

## 2024-10-13 RX ADMIN — Medication 1000 MILLIGRAM(S): at 05:24

## 2024-10-13 RX ADMIN — OXYCODONE HYDROCHLORIDE 2.5 MILLIGRAM(S): 30 TABLET, FILM COATED, EXTENDED RELEASE ORAL at 21:33

## 2024-10-13 RX ADMIN — GABAPENTIN 100 MILLIGRAM(S): 800 TABLET, FILM COATED ORAL at 05:24

## 2024-10-13 RX ADMIN — Medication 1000 MILLIGRAM(S): at 00:11

## 2024-10-13 RX ADMIN — Medication 1000 MILLIGRAM(S): at 14:08

## 2024-10-13 RX ADMIN — OXYCODONE HYDROCHLORIDE 2.5 MILLIGRAM(S): 30 TABLET, FILM COATED, EXTENDED RELEASE ORAL at 10:02

## 2024-10-13 RX ADMIN — OXYCODONE HYDROCHLORIDE 2.5 MILLIGRAM(S): 30 TABLET, FILM COATED, EXTENDED RELEASE ORAL at 11:02

## 2024-10-13 RX ADMIN — Medication 1000 MILLIGRAM(S): at 15:08

## 2024-10-13 RX ADMIN — GABAPENTIN 100 MILLIGRAM(S): 800 TABLET, FILM COATED ORAL at 14:08

## 2024-10-13 RX ADMIN — Medication 7.5 MILLIGRAM(S): at 17:37

## 2024-10-13 RX ADMIN — AMIODARONE HYDROCHLORIDE 100 MILLIGRAM(S): 50 INJECTION, SOLUTION INTRAVENOUS at 09:10

## 2024-10-13 RX ADMIN — Medication 25 MILLIGRAM(S): at 17:37

## 2024-10-13 RX ADMIN — OXYCODONE HYDROCHLORIDE 2.5 MILLIGRAM(S): 30 TABLET, FILM COATED, EXTENDED RELEASE ORAL at 00:11

## 2024-10-13 RX ADMIN — GABAPENTIN 100 MILLIGRAM(S): 800 TABLET, FILM COATED ORAL at 21:33

## 2024-10-13 RX ADMIN — ENOXAPARIN SODIUM 40 MILLIGRAM(S): 150 INJECTION SUBCUTANEOUS at 17:37

## 2024-10-13 RX ADMIN — Medication 400 MILLIGRAM(S): at 18:56

## 2024-10-13 RX ADMIN — Medication 1000 MILLIGRAM(S): at 06:43

## 2024-10-13 NOTE — PROGRESS NOTE ADULT - PROBLEM SELECTOR PROBLEM 1
Pelvic fracture

## 2024-10-13 NOTE — PROGRESS NOTE ADULT - PROBLEM SELECTOR PLAN 5
Patient with hx of Afib, not currently on anticoagulation.   - EKG showing sinus rhythm with occasional PVCs at 80 bpm   - Continue home Amiodarone and metoprolol tartrate  - As per son, patient's cardiologist doubted patient had Afib and stopped AC, unsure why still on amiodarone. Son understands to f/u with cardio upon discharge. Son verbalized understanding
Continue home buspirone
Patient with hx of Afib, not currently on anticoagulation.   - EKG showing sinus rhythm with occasional PVCs at 80 bpm   - Continue home Amiodarone and metoprolol tartrate  - As per son, patient's cardiologist doubted patient had Afib and stopped AC, unsure why still on amiodarone- son to reach out to outpatient cardio
Patient with hx of Afib, not currently on anticoagulation.   - EKG showing sinus rhythm with occasional PVCs at 80 bpm   - Continue home Amiodarone and metoprolol tartrate  - As per son, patient's cardiologist doubted patient had Afib and stopped AC, unsure why still on amiodarone. Son understands to f/u with cardio upon discharge. Son verbalized understanding
Patient with hx of Afib, not currently on anticoagulation.   - EKG showing sinus rhythm with occasional PVCs at 80 bpm   - Continue home Amiodarone and metoprolol tartrate
Continue home buspirone
Patient with hx of Afib, not currently on anticoagulation.   - EKG showing sinus rhythm with occasional PVCs at 80 bpm   - Continue home Amiodarone and metoprolol tartrate  - As per son, patient's cardiologist doubted patient had Afib and stopped AC, unsure why still on amiodarone-  called son today. he has no idea. I suggested to f/u with cardio after discharge and decided about discontinuation. he verbalized understanding

## 2024-10-13 NOTE — PROGRESS NOTE ADULT - PROBLEM SELECTOR PLAN 3
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 given salt tabs
Na 127 yesterday.  (126 on 10/8). 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 given salt tabs
Well controlled   - Continue home amlodipine  - monitor vitals
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 Started on salt tabs
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 given salt tabs
Well controlled   - Continue home amlodipine
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

## 2024-10-13 NOTE — PROGRESS NOTE ADULT - PROBLEM SELECTOR PROBLEM 3
HTN (hypertension)
Hyponatremia
HTN (hypertension)
Hyponatremia
Hyponatremia

## 2024-10-13 NOTE — PROGRESS NOTE ADULT - PROBLEM SELECTOR PROBLEM 4
Afib
HTN (hypertension)
Afib

## 2024-10-13 NOTE — PROGRESS NOTE ADULT - PROBLEM SELECTOR PLAN 2
Date:  2017 at  10:37 AM   Stable patient, will be  discharged to Home with mother.    Discharge instructions reviewed with Ruben Carreon's mom by Sola Rebolledo RN.  Patient's mom verbalized understanding and states she has no further questions at this time.    
neutrophil predominance, Afebrile, hemodynamically stable  RESOLVED  - blood cultures x 2 no growth to date  - trend WBC   - monitor off abx, nontoxic appearing  could be reactive to above  -Follow up AM CBC
neutrophil predominance, Afebrile, hemodynamically stable  - blood cultures x 2 no growth to date  - trend WBC   - monitor off abx, nontoxic appearing  - could be reactive to above  - Follow up AM CBC
neutrophil predominance, Afebrile, hemodynamically stable  downtrending  - blood cultures x 2 no growth to date  - trend WBC   - monitor off abx, nontoxic appearing  could be reactive to above  -Follow up AM CBC
Leukocytosis 21.64 with neutrophil predominance. Afebrile. HDS.   - F/u UA  - F/u BCx and UCx   - Trend WBC and monitor for fever - leukocytosis downtrending  - As per family, patient with multiple UTIs in past, usually does not have many symptoms- patient denies any dysuria, urinary urgency/frequency, hematuria- will check UA with reflex culture  - Monitor off abx at this time, if patient spikes fever overnight, would start empiric antibiotics, family in agreement with plan
neutrophil predominance, Afebrile, hemodynamically stable  RESOLVED  - blood cultures x 2 no growth to date  - trend WBC   - monitor off abx, nontoxic appearing  could be reactive to above  -Follow up AM CBC
neutrophil predominance, Afebrile, hemodynamically stable  RESOLVED  - blood cultures x 2 no growth to date  - trend WBC   - monitor off abx, nontoxic appearing  could be reactive to above  -Follow up AM CBC
neutrophil predominance, Afebrile, hemodynamically stable  downtrending  - blood cultures x 2 no growth to date  - trend WBC   - monitor off abx, nontoxic appearing  could be reactive to above  -Follow up AM CBC

## 2024-10-13 NOTE — PROGRESS NOTE ADULT - PROBLEM SELECTOR PROBLEM 6
Mood disorder
Need for prophylactic measure
Mood disorder
Need for prophylactic measure
Mood disorder

## 2024-10-13 NOTE — PROGRESS NOTE ADULT - PROBLEM SELECTOR PLAN 4
Well controlled   - Continue home amlodipine  - monitor vitals
Patient with hx of Afib, not currently on anticoagulation.   - EKG showing sinus rhythm with occasional PVCs at 80 bpm   - Continue home Amiodarone and metoprolol tartrate  - As per sons, patient was on eliquis, but was taken off by PCP at [facility] (as per family- no Afib noted at [facility]- so eliquis stopped- family does not want to restart AC at this time and given recent fall, would avoid AC at this time)
Well controlled   - Continue home amlodipine  - monitor vitals
Patient with hx of Afib, not currently on anticoagulation.   - EKG showing sinus rhythm with occasional PVCs at 80 bpm   - Continue home Amiodarone and metoprolol tartrate
Well controlled   - Continue home amlodipine  - monitor vitals

## 2024-10-13 NOTE — PROGRESS NOTE ADULT - PROBLEM SELECTOR PLAN 1
Patient presents s/p mechanical fall. Found with right sacral ala comminuted fracture, right-sided superior and inferior rami comminuted displaced fractures on CT Pelvis.   - Also with pelvic sidewall hematoma. Small amount of presumed blood abutting the bladder. Injury to the bladder cannot be excluded.   - CT head, cervical and lumbar spine negative    - Continue pain control regimen  - Fall precautions  - PT eval - EVERT  - Ortho consulted - no surgical intervention indicated, WBAT  - Discussed with uro team - CT cystogram without evidence of bladder injury- no urological intervention at this time  - Will trial gabapentin 100mg TID for burning pain in B/L feet  - Pain management consulted- appreciate recs  - WBC 12.12 today, repeat CBC in AM

## 2024-10-14 ENCOUNTER — TRANSCRIPTION ENCOUNTER (OUTPATIENT)
Age: 89
End: 2024-10-14

## 2024-10-14 VITALS
SYSTOLIC BLOOD PRESSURE: 104 MMHG | DIASTOLIC BLOOD PRESSURE: 67 MMHG | RESPIRATION RATE: 20 BRPM | OXYGEN SATURATION: 92 % | TEMPERATURE: 98 F | HEART RATE: 71 BPM

## 2024-10-14 LAB
ANION GAP SERPL CALC-SCNC: 7 MMOL/L — SIGNIFICANT CHANGE UP (ref 5–17)
BUN SERPL-MCNC: 19 MG/DL — SIGNIFICANT CHANGE UP (ref 7–23)
CALCIUM SERPL-MCNC: 8.9 MG/DL — SIGNIFICANT CHANGE UP (ref 8.5–10.1)
CHLORIDE SERPL-SCNC: 104 MMOL/L — SIGNIFICANT CHANGE UP (ref 96–108)
CO2 SERPL-SCNC: 25 MMOL/L — SIGNIFICANT CHANGE UP (ref 22–31)
CREAT SERPL-MCNC: 0.75 MG/DL — SIGNIFICANT CHANGE UP (ref 0.5–1.3)
EGFR: 77 ML/MIN/1.73M2 — SIGNIFICANT CHANGE UP
GLUCOSE SERPL-MCNC: 86 MG/DL — SIGNIFICANT CHANGE UP (ref 70–99)
HCT VFR BLD CALC: 32.7 % — LOW (ref 34.5–45)
HGB BLD-MCNC: 10.7 G/DL — LOW (ref 11.5–15.5)
MCHC RBC-ENTMCNC: 32.3 PG — SIGNIFICANT CHANGE UP (ref 27–34)
MCHC RBC-ENTMCNC: 32.7 GM/DL — SIGNIFICANT CHANGE UP (ref 32–36)
MCV RBC AUTO: 98.8 FL — SIGNIFICANT CHANGE UP (ref 80–100)
NRBC # BLD: 0 /100 WBCS — SIGNIFICANT CHANGE UP (ref 0–0)
PLATELET # BLD AUTO: 313 K/UL — SIGNIFICANT CHANGE UP (ref 150–400)
POTASSIUM SERPL-MCNC: 4 MMOL/L — SIGNIFICANT CHANGE UP (ref 3.5–5.3)
POTASSIUM SERPL-SCNC: 4 MMOL/L — SIGNIFICANT CHANGE UP (ref 3.5–5.3)
RBC # BLD: 3.31 M/UL — LOW (ref 3.8–5.2)
RBC # FLD: 14.3 % — SIGNIFICANT CHANGE UP (ref 10.3–14.5)
SODIUM SERPL-SCNC: 136 MMOL/L — SIGNIFICANT CHANGE UP (ref 135–145)
WBC # BLD: 10.21 K/UL — SIGNIFICANT CHANGE UP (ref 3.8–10.5)
WBC # FLD AUTO: 10.21 K/UL — SIGNIFICANT CHANGE UP (ref 3.8–10.5)

## 2024-10-14 PROCEDURE — 99239 HOSP IP/OBS DSCHRG MGMT >30: CPT

## 2024-10-14 RX ORDER — OXYCODONE HYDROCHLORIDE 30 MG/1
0.5 TABLET, FILM COATED, EXTENDED RELEASE ORAL
Qty: 15 | Refills: 0
Start: 2024-10-14 | End: 2024-10-23

## 2024-10-14 RX ADMIN — OXYCODONE HYDROCHLORIDE 2.5 MILLIGRAM(S): 30 TABLET, FILM COATED, EXTENDED RELEASE ORAL at 13:05

## 2024-10-14 RX ADMIN — Medication 25 MILLIGRAM(S): at 05:24

## 2024-10-14 RX ADMIN — GABAPENTIN 100 MILLIGRAM(S): 800 TABLET, FILM COATED ORAL at 13:05

## 2024-10-14 RX ADMIN — OXYCODONE HYDROCHLORIDE 2.5 MILLIGRAM(S): 30 TABLET, FILM COATED, EXTENDED RELEASE ORAL at 14:32

## 2024-10-14 RX ADMIN — Medication 1000 MILLIGRAM(S): at 13:05

## 2024-10-14 RX ADMIN — GABAPENTIN 100 MILLIGRAM(S): 800 TABLET, FILM COATED ORAL at 05:24

## 2024-10-14 RX ADMIN — Medication 7.5 MILLIGRAM(S): at 05:23

## 2024-10-14 RX ADMIN — OXYCODONE HYDROCHLORIDE 2.5 MILLIGRAM(S): 30 TABLET, FILM COATED, EXTENDED RELEASE ORAL at 05:52

## 2024-10-14 RX ADMIN — Medication 1000 MILLIGRAM(S): at 05:23

## 2024-10-14 RX ADMIN — AMIODARONE HYDROCHLORIDE 100 MILLIGRAM(S): 50 INJECTION, SOLUTION INTRAVENOUS at 05:24

## 2024-10-14 RX ADMIN — OXYCODONE HYDROCHLORIDE 2.5 MILLIGRAM(S): 30 TABLET, FILM COATED, EXTENDED RELEASE ORAL at 03:52

## 2024-10-14 RX ADMIN — Medication 1000 MILLIGRAM(S): at 14:31

## 2024-10-14 RX ADMIN — Medication 1000 MILLIGRAM(S): at 05:51

## 2024-10-14 RX ADMIN — OXYCODONE HYDROCHLORIDE 2.5 MILLIGRAM(S): 30 TABLET, FILM COATED, EXTENDED RELEASE ORAL at 05:50

## 2024-10-14 NOTE — PROGRESS NOTE ADULT - PROVIDER SPECIALTY LIST ADULT
Nephrology
Nephrology
Orthopedics
Urology
Nephrology
Nephrology
Hospitalist

## 2024-10-14 NOTE — PROGRESS NOTE ADULT - REASON FOR ADMISSION
Right superior/inferior pubic rami fracture

## 2024-10-14 NOTE — PROGRESS NOTE ADULT - SUBJECTIVE AND OBJECTIVE BOX
Patient is a 89y old  Female who presents with a chief complaint of Right superior/inferior pubic rami fracture (09 Oct 2024 23:56)  Patient seen in follow up for hyponatremia.        PAST MEDICAL HISTORY:  HTN (hypertension)    Hypertension    Dementia    AF (atrial fibrillation)      MEDICATIONS  (STANDING):  acetaminophen     Tablet .. 1000 milliGRAM(s) Oral every 8 hours  aMIOdarone    Tablet 100 milliGRAM(s) Oral daily  busPIRone 7.5 milliGRAM(s) Oral two times a day  enoxaparin Injectable 40 milliGRAM(s) SubCutaneous every 24 hours  gabapentin 100 milliGRAM(s) Oral three times a day  metoprolol tartrate 25 milliGRAM(s) Oral two times a day  ondansetron Injectable 4 milliGRAM(s) IV Push once  oxyCODONE    IR 2.5 milliGRAM(s) Oral every 8 hours    MEDICATIONS  (PRN):  hydrocortisone 2.5% Rectal Cream 1 Application(s) Rectal daily PRN Rectal pain    T(C): 36.8 (10-14-24 @ 12:07), Max: 36.8 (10-12-24 @ 20:13)  HR: 71 (10-14-24 @ 12:07) (69 - 91)  BP: 104/67 (10-14-24 @ 12:07) (99/62 - 123/74)  RR: 20 (10-14-24 @ 12:07)  SpO2: 92% (10-14-24 @ 12:07)  Wt(kg): --  I&O's Detail          PHYSICAL EXAM:  General: No distress  Respiratory: b/l air entry  Cardiovascular: S1 S2  Gastrointestinal: soft  Extremities:  no edema                         LABORATORY:                        10.7   10.21 )-----------( 313      ( 14 Oct 2024 05:30 )             32.7     10-14    136  |  104  |  19  ----------------------------<  86  4.0   |  25  |  0.75    Ca    8.9      14 Oct 2024 05:30      Sodium: 136 mmol/L (10-14 @ 05:30)  Sodium: 134 mmol/L (10-13 @ 07:10)    Potassium: 4.0 mmol/L (10-14 @ 05:30)  Potassium: 4.1 mmol/L (10-13 @ 07:10)    Hemoglobin: 10.7 g/dL (10-14 @ 05:30)  Hemoglobin: 11.5 g/dL (10-13 @ 07:10)  Hemoglobin: 10.9 g/dL (10-12 @ 06:06)    Creatinine, Serum 0.75 (10-14 @ 05:30)  Creatinine, Serum 0.71 (10-13 @ 07:10)  Creatinine, Serum 0.76 (10-12 @ 06:06)          Urinalysis Basic - ( 14 Oct 2024 05:30 )    Color: x / Appearance: x / SG: x / pH: x  Gluc: 86 mg/dL / Ketone: x  / Bili: x / Urobili: x   Blood: x / Protein: x / Nitrite: x   Leuk Esterase: x / RBC: x / WBC x   Sq Epi: x / Non Sq Epi: x / Bacteria: x      
Patient is a 89y old  Female who presents with a chief complaint of Right superior/inferior pubic rami fracture (09 Oct 2024 23:56)  Patient seen in follow up for hyponatremia.        PAST MEDICAL HISTORY:  HTN (hypertension)    Hypertension    Dementia    AF (atrial fibrillation)      MEDICATIONS  (STANDING):  acetaminophen     Tablet .. 1000 milliGRAM(s) Oral every 8 hours  aMIOdarone    Tablet 100 milliGRAM(s) Oral daily  amLODIPine   Tablet 5 milliGRAM(s) Oral daily  busPIRone 7.5 milliGRAM(s) Oral two times a day  enoxaparin Injectable 40 milliGRAM(s) SubCutaneous every 24 hours  gabapentin 100 milliGRAM(s) Oral three times a day  metoprolol tartrate 25 milliGRAM(s) Oral two times a day  ondansetron Injectable 4 milliGRAM(s) IV Push once  oxyCODONE    IR 2.5 milliGRAM(s) Oral every 8 hours    MEDICATIONS  (PRN):  hydrocortisone 2.5% Rectal Cream 1 Application(s) Rectal daily PRN Rectal pain    T(C): 36.3 (10-13-24 @ 11:57), Max: 37 (10-11-24 @ 20:06)  HR: 77 (10-13-24 @ 11:57) (64 - 77)  BP: 108/66 (10-13-24 @ 11:57) (98/61 - 116/62)  RR: 20 (10-13-24 @ 11:57)  SpO2: 94% (10-13-24 @ 11:57)  Wt(kg): --  I&O's Detail            PHYSICAL EXAM:  General: No distress  Respiratory: b/l air entry  Cardiovascular: S1 S2  Gastrointestinal: soft  Extremities:  no edema                               LABORATORY:                        11.5   12.12 )-----------( 212      ( 13 Oct 2024 07:10 )             35.6     10-13    134[L]  |  104  |  19  ----------------------------<  82  4.1   |  18[L]  |  0.71    Ca    8.3[L]      13 Oct 2024 07:10      Sodium: 134 mmol/L (10-13 @ 07:10)  Sodium: 136 mmol/L (10-12 @ 06:06)    Potassium: 4.1 mmol/L (10-13 @ 07:10)  Potassium: 3.8 mmol/L (10-12 @ 06:06)    Hemoglobin: 11.5 g/dL (10-13 @ 07:10)  Hemoglobin: 10.9 g/dL (10-12 @ 06:06)  Hemoglobin: 10.9 g/dL (10-11 @ 07:14)    Creatinine, Serum 0.71 (10-13 @ 07:10)  Creatinine, Serum 0.76 (10-12 @ 06:06)  Creatinine, Serum 0.76 (10-11 @ 07:14)          Urinalysis Basic - ( 13 Oct 2024 07:10 )    Color: x / Appearance: x / SG: x / pH: x  Gluc: 82 mg/dL / Ketone: x  / Bili: x / Urobili: x   Blood: x / Protein: x / Nitrite: x   Leuk Esterase: x / RBC: x / WBC x   Sq Epi: x / Non Sq Epi: x / Bacteria: x      
Patient is a 89y old  Female who presents with a chief complaint of Right superior/inferior pubic rami fracture (09 Oct 2024 23:56)  Patient seen in follow up for hyponatremia.        PAST MEDICAL HISTORY:  HTN (hypertension)    Hypertension    Dementia    AF (atrial fibrillation)      MEDICATIONS  (STANDING):  acetaminophen     Tablet .. 1000 milliGRAM(s) Oral every 8 hours  aMIOdarone    Tablet 100 milliGRAM(s) Oral daily  amLODIPine   Tablet 5 milliGRAM(s) Oral daily  busPIRone 7.5 milliGRAM(s) Oral two times a day  enoxaparin Injectable 40 milliGRAM(s) SubCutaneous every 24 hours  gabapentin 100 milliGRAM(s) Oral three times a day  metoprolol tartrate 25 milliGRAM(s) Oral two times a day  ondansetron Injectable 4 milliGRAM(s) IV Push once  oxyCODONE    IR 2.5 milliGRAM(s) Oral every 8 hours    MEDICATIONS  (PRN):  hydrocortisone 2.5% Rectal Cream 1 Application(s) Rectal daily PRN Rectal pain    T(C): 36.4 (10-11-24 @ 04:43), Max: 36.8 (10-09-24 @ 18:09)  HR: 68 (10-11-24 @ 04:43) (66 - 74)  BP: 111/66 (10-11-24 @ 04:43) (104/59 - 133/71)  RR: 18 (10-11-24 @ 04:43)  SpO2: 93% (10-11-24 @ 04:43)  Wt(kg): --  I&O's Detail    10 Oct 2024 07:01  -  11 Oct 2024 07:00  --------------------------------------------------------  IN:  Total IN: 0 mL    OUT:    Voided (mL): 300 mL  Total OUT: 300 mL    Total NET: -300 mL              PHYSICAL EXAM:  General: No distress  Respiratory: b/l air entry  Cardiovascular: S1 S2  Gastrointestinal: soft  Extremities:  no edema                               LABORATORY:                        10.9   10.33 )-----------( 220      ( 11 Oct 2024 07:14 )             32.0     10-11    134[L]  |  102  |  18  ----------------------------<  91  3.8   |  22  |  0.76    Ca    8.3[L]      11 Oct 2024 07:14      Sodium: 134 mmol/L (10-11 @ 07:14)  Sodium: 127 mmol/L (10-10 @ 07:55)    Potassium: 3.8 mmol/L (10-11 @ 07:14)  Potassium: 4.1 mmol/L (10-10 @ 07:55)    Hemoglobin: 10.9 g/dL (10-11 @ 07:14)  Hemoglobin: 11.3 g/dL (10-10 @ 07:55)  Hemoglobin: 10.9 g/dL (10-09 @ 07:51)  Hemoglobin: 11.4 g/dL (10-08 @ 11:37)    Creatinine, Serum 0.76 (10-11 @ 07:14)  Creatinine, Serum 0.66 (10-10 @ 07:55)  Creatinine, Serum 0.60 (10-09 @ 07:51)  Creatinine, Serum 0.69 (10-08 @ 11:37)          Urinalysis Basic - ( 11 Oct 2024 07:14 )    Color: x / Appearance: x / SG: x / pH: x  Gluc: 91 mg/dL / Ketone: x  / Bili: x / Urobili: x   Blood: x / Protein: x / Nitrite: x   Leuk Esterase: x / RBC: x / WBC x   Sq Epi: x / Non Sq Epi: x / Bacteria: x      
Pt seen and examined at bedside. No overnight events. Pain controlled. Denies fever chills chest pain SOB new numbness weakness or tingling in the extremities.     Vital Signs (24 Hrs):  T(C): 36.4 (10-08-24 @ 05:07), Max: 36.7 (10-07-24 @ 21:40)  HR: 69 (10-08-24 @ 05:07) (69 - 81)  BP: 104/65 (10-08-24 @ 05:07) (104/65 - 148/77)  RR: 18 (10-08-24 @ 05:07) (17 - 18)  SpO2: 95% (10-08-24 @ 05:07) (94% - 97%)  Wt(kg): --    LABS:                          11.2   15.01 )-----------( 232      ( 07 Oct 2024 06:40 )             34.6     10-07    133[L]  |  101  |  19  ----------------------------<  120[H]  3.8   |  22  |  0.71    Ca    8.6      07 Oct 2024 06:40    TPro  7.4  /  Alb  3.5  /  TBili  0.9  /  DBili  x   /  AST  29  /  ALT  37  /  AlkPhos  71  10-07    LIVER FUNCTIONS - ( 07 Oct 2024 06:40 )  Alb: 3.5 g/dL / Pro: 7.4 g/dL / ALK PHOS: 71 U/L / ALT: 37 U/L / AST: 29 U/L / GGT: x           PT/INR - ( 07 Oct 2024 06:40 )   PT: 11.8 sec;   INR: 1.01 ratio         PTT - ( 07 Oct 2024 06:40 )  PTT:29.0 sec    Skin: No erythema, edema or gross lesions noted.   TTP R hip  SILT L2-S1  Motor: +EHL/FHL/TA/GSc  Compartments soft and compressible  Calves NTTP b/l  +2 DP  Exam:    General: NAD    Spine:    No palpable step off. Nontender to palpation.     Motor:                   C5                C6              C7               C8           T1   R            5/5                5/5            5/5             5/5          5/5  L             5/5               5/5             5/5             5/5          5/5                L2             L3             L4               L5            S1  R         ****           5/5          5/5             5/5           5/5  L          5/5          5/5           5/5             5/5           5/5    ***pt refuses to fire 2/2 pain    Sensory:            C5         C6         C7      C8       T1        (0=absent, 1=impaired, 2=normal, NT=not testable)  R         2            2           2        2         2  L          2            2           2        2         2               L2          L3         L4      L5       S1         (0=absent, 1=impaired, 2=normal, NT=not testable)  R         2            2            2        2        2  L          2            2           2        2         2    UMNs:    Babinski (-) B/L  Clonus (-) B/L  Plaza (-) B/L    A/p:  89F w R sup/inf pubic rami fx and r sacral ala fx  WBAT  Pain regimen PRN  DVT ppx: per primary team  Dispo per PT  Plan discussed w patient, who is in agreement with the above  Plan discussed w attending, who is in agreement with the above    
Patient is a 89y old  Female who presents with a chief complaint of Right superior/inferior pubic rami fracture (08 Oct 2024 09:53)    Patient seen and examined at bedside. C/O pain in left groin and urinary incontinence without dysuria; denies CP, SOB, abd pain , N/V    MEDICATIONS:  aMIOdarone    Tablet 100 milliGRAM(s) Oral daily  amLODIPine   Tablet 5 milliGRAM(s) Oral daily  busPIRone 7.5 milliGRAM(s) Oral two times a day  metoprolol tartrate 25 milliGRAM(s) Oral two times a day  ondansetron Injectable 4 milliGRAM(s) IV Push once    MEDICATIONS  (PRN):  acetaminophen     Tablet .. 650 milliGRAM(s) Oral every 6 hours PRN Temp greater or equal to 38C (100.4F), Mild Pain (1 - 3)  hydrocortisone 2.5% Rectal Cream 1 Application(s) Rectal daily PRN Rectal pain  traMADol 50 milliGRAM(s) Oral every 6 hours PRN Severe Pain (7 - 10)  traMADol 25 milliGRAM(s) Oral every 6 hours PRN Moderate Pain (4 - 6)      Allergies  No Known Allergies      T(C): 36.4 (10-08-24 @ 05:07), Max: 36.7 (10-06-24 @ 19:52)  T(F): 97.6 (10-08-24 @ 05:07), Max: 98 (10-06-24 @ 19:52)  HR: 69 (10-08-24 @ 05:07) (69 - 89)  BP: 104/65 (10-08-24 @ 05:07) (104/65 - 148/77)  RR: 18 (10-08-24 @ 05:07) (16 - 18)  SpO2: 95% (10-08-24 @ 05:07) (94% - 97%)        LABS:                        11.2   15.01 )-----------( 232      ( 07 Oct 2024 06:40 )             34.6     10-07    133[L]  |  101  |  19  ----------------------------<  120[H]  3.8   |  22  |  0.71    Ca    8.6      07 Oct 2024 06:40    TPro  7.4  /  Alb  3.5  /  TBili  0.9  /  DBili  x   /  AST  29  /  ALT  37  /  AlkPhos  71  10-07    PT/INR - ( 07 Oct 2024 06:40 )   PT: 11.8 sec;   INR: 1.01 ratio         PTT - ( 07 Oct 2024 06:40 )  PTT:29.0 sec    Urinalysis Basic - ( 07 Oct 2024 19:00 )    Color: Yellow / Appearance: Clear / S.015 / pH: x  Gluc: x / Ketone: 15 mg/dL  / Bili: Negative / Urobili: 0.2 mg/dL   Blood: x / Protein: 30 mg/dL / Nitrite: Negative   Leuk Esterase: Trace / RBC: 7 /HPF / WBC 4 /HPF   Sq Epi: x / Non Sq Epi: x / Bacteria: x        Physical Exam    Constitutional: alert, no acute distress    Abdomen: soft, nontender, nondistended, L groin tenderness, no ecchymosis     Genitourinary: no bladder distention        RADIOLGY  < from: CT Pelvis No Cont (10.06.24 @ 23:21) >    ACC: [id number] EXAM:  CT 3D RECONSTRUCT WO [facility]   ORDERED BY:   JADE VILLARREAL     ACC: [id number] EXAM:  CT PELVIS ONLY   ORDERED BY: JADE VILLARREAL     PROCEDURE DATE:  10/06/2024          INTERPRETATION:  [facility] RADIOLOGIST PRELIMINARY REPORT    PROCEDURE INFORMATION:  Exam: CT Pelvis Without Contrast, Skeleton  Exam date and time: 10/6/2024 10:41 PM  Age: 89 years old  Clinical indication: Hip pain    TECHNIQUE:  Imaging protocol: Computed tomography of the pelvis without contrast. Exam  focused on the skeleton.  3D rendering (Not supervised by radiologist): MIP and/or 3D reconstructed  images were created by the technologist.    COMPARISON:  CT ABDOMEN AND PELVIS 2023 8:04 AM    FINDINGS:  Gallbladder and biliary ducts: Cholelithiasis.  Vasculature: Atherosclerosis.  Reproductive: Uterine calcifications likely secondary to fibroids.  Bones/joints: Degenerative changes of the lower lumbar spine. Right   sacral ala  comminuted fracture. Degenerative changes of the SI joints. Right-sided  superior and inferior rami comminuted displaced fractures. Severe   degenerative  changes of the hip joint spaces. Joint space narrowing and spurring. No   displaced femoral  fracture or dislocation.  Soft tissues: Small amountof right pelvic sidewall fluid. Small amount of  fluid abutting the right lateral inferior margin of the bladder.    IMPRESSION:  1.   Right-sided acute pelvic fractures.  2.   Pelvic sidewall hematoma. Small amount of presumed blood abutting the  bladder. Injury to the bladder cannot be excluded. Recommend clinical  correlation and follow-up.    --- End of Report ---      < from: US Kidney and Bladder (10.07.24 @ 18:23) >    ACC: [id number] EXAM:  US KIDNEYS AND BLADDER   ORDERED BY: DAJUAN JENNINGS     PROCEDURE DATE:  10/07/2024          INTERPRETATION:  CLINICAL INFORMATION: Follow-up pelvic sidewall hematoma    COMPARISON: CT 10/6/2024    TECHNIQUE: Sonography of the kidneys and bladder. Exam limited by patient   cooperativity.    FINDINGS:  Right kidney: 8.3cm. No renal mass, hydronephrosis or calculi.    Left kidney: 9.2cm. No renal mass, hydronephrosis or calculi.    Urinary bladder: Within normal limits. RIGHT pelvic sidewall hematoma   observed on CT is occult on ultrasound    IMPRESSION:  RIGHT pelvic sidewall hematoma observed on CT is occult on ultrasound      < end of copied text >      
Patient is a 89y old  Female who presents with a chief complaint of Right superior/inferior pubic rami fracture (09 Oct 2024 23:56)  Patient seen in follow up for hyponatremia.        PAST MEDICAL HISTORY:  HTN (hypertension)    Hypertension    Dementia    AF (atrial fibrillation)      MEDICATIONS  (STANDING):  acetaminophen     Tablet .. 1000 milliGRAM(s) Oral every 8 hours  aMIOdarone    Tablet 100 milliGRAM(s) Oral daily  amLODIPine   Tablet 5 milliGRAM(s) Oral daily  busPIRone 7.5 milliGRAM(s) Oral two times a day  enoxaparin Injectable 40 milliGRAM(s) SubCutaneous every 24 hours  gabapentin 100 milliGRAM(s) Oral three times a day  metoprolol tartrate 25 milliGRAM(s) Oral two times a day  ondansetron Injectable 4 milliGRAM(s) IV Push once  oxyCODONE    IR 2.5 milliGRAM(s) Oral every 8 hours    MEDICATIONS  (PRN):  hydrocortisone 2.5% Rectal Cream 1 Application(s) Rectal daily PRN Rectal pain    T(C): 36.3 (10-10-24 @ 04:45), Max: 36.8 (10-09-24 @ 18:09)  HR: 68 (10-10-24 @ 04:45) (67 - 74)  BP: 113/69 (10-10-24 @ 04:45) (104/67 - 133/71)  RR: 18 (10-10-24 @ 04:45) (18 - 18)  SpO2: 93% (10-10-24 @ 04:45) (93% - 98%)  Wt(kg): --  I&O's Detail    09 Oct 2024 07:01  -  10 Oct 2024 07:00  --------------------------------------------------------  IN:  Total IN: 0 mL    OUT:    Voided (mL): 500 mL  Total OUT: 500 mL    Total NET: -500 mL          PHYSICAL EXAM:  General: No distress  Respiratory: b/l air entry  Cardiovascular: S1 S2  Gastrointestinal: soft  Extremities:  no edema                              11.3   10.15 )-----------( 184      ( 10 Oct 2024 07:55 )             33.7     10-10    127[L]  |  100  |  18  ----------------------------<  78  4.1   |  20[L]  |  0.66    Ca    8.7      10 Oct 2024 07:55            Urinalysis Basic - ( 10 Oct 2024 07:55 )    Color: x / Appearance: x / SG: x / pH: x  Gluc: 78 mg/dL / Ketone: x  / Bili: x / Urobili: x   Blood: x / Protein: x / Nitrite: x   Leuk Esterase: x / RBC: x / WBC x   Sq Epi: x / Non Sq Epi: x / Bacteria: x        Sodium, Serum: 127 (10-10 @ 07:55)  Sodium, Serum: 128 (10-09 @ 07:51)  Sodium, Serum: 126 (10-08 @ 11:37)  Sodium, Serum: 133 (10-07 @ 06:40)    Creatinine, Serum: 0.66 (10-10 @ 07:55)  Creatinine, Serum: 0.60 (10-09 @ 07:51)  Creatinine, Serum: 0.69 (10-08 @ 11:37)  Creatinine, Serum: 0.71 (10-07 @ 06:40)  Creatinine, Serum: 0.93 (10-07 @ 00:54)    Potassium, Serum: 4.1 (10-10 @ 07:55)  Potassium, Serum: 3.5 (10-09 @ 07:51)  Potassium, Serum: 3.9 (10-08 @ 11:37)  Potassium, Serum: 3.8 (10-07 @ 06:40)    Hemoglobin: 11.3 (10-10 @ 07:55)  Hemoglobin: 10.9 (10-09 @ 07:51)  Hemoglobin: 11.4 (10-08 @ 11:37)  Hemoglobin: 11.2 (10-07 @ 06:40)    
Patient is a 89y old  Female who presents with a chief complaint of Right superior/inferior pubic rami fracture (12 Oct 2024 15:33)      INTERVAL HPI/OVERNIGHT EVENTS: Patent seen and examined at bedside. No overnight events. Tolerating diet. Denies fever, chills, chest pain, shortness of breath, abdominal pain, nausea/vomiting, headache. Requests pain med.     MEDICATIONS  (STANDING):  acetaminophen     Tablet .. 1000 milliGRAM(s) Oral every 8 hours  aMIOdarone    Tablet 100 milliGRAM(s) Oral daily  amLODIPine   Tablet 5 milliGRAM(s) Oral daily  busPIRone 7.5 milliGRAM(s) Oral two times a day  enoxaparin Injectable 40 milliGRAM(s) SubCutaneous every 24 hours  gabapentin 100 milliGRAM(s) Oral three times a day  metoprolol tartrate 25 milliGRAM(s) Oral two times a day  ondansetron Injectable 4 milliGRAM(s) IV Push once  oxyCODONE    IR 2.5 milliGRAM(s) Oral every 8 hours    MEDICATIONS  (PRN):  hydrocortisone 2.5% Rectal Cream 1 Application(s) Rectal daily PRN Rectal pain      Allergies    Allergy Status Unknown  No Known Allergies    Intolerances        REVIEW OF SYSTEMS:  All other review of systems is negative unless indicated above    Vital Signs Last 24 Hrs  T(C): 36.6 (13 Oct 2024 09:07), Max: 36.8 (12 Oct 2024 20:13)  T(F): 97.9 (13 Oct 2024 09:07), Max: 98.2 (12 Oct 2024 20:13)  HR: 73 (13 Oct 2024 09:07) (65 - 73)  BP: 116/62 (13 Oct 2024 09:07) (99/62 - 116/62)  BP(mean): --  RR: 18 (13 Oct 2024 09:07) (18 - 18)  SpO2: 93% (13 Oct 2024 09:07) (91% - 97%)    Parameters below as of 13 Oct 2024 09:07  Patient On (Oxygen Delivery Method): room air        PHYSICAL EXAM:  GENERAL: NAD  HEENT:  anicteric, moist mucous membranes  CHEST/LUNG:  CTA b/l, no rales, wheezes, or rhonchi  HEART:  RRR, S1, S2  ABDOMEN:  BS+, soft, nontender, nondistended  EXTREMITIES: no edema, cyanosis, or calf tenderness  NERVOUS SYSTEM: awake, alert    LABS:                        11.5   12.12 )-----------( 212      ( 13 Oct 2024 07:10 )             35.6     CBC Full  -  ( 13 Oct 2024 07:10 )  WBC Count : 12.12 K/uL  Hemoglobin : 11.5 g/dL  Hematocrit : 35.6 %  Platelet Count - Automated : 212 K/uL  Mean Cell Volume : 99.7 fl  Mean Cell Hemoglobin : 32.2 pg  Mean Cell Hemoglobin Concentration : 32.3 gm/dL  Auto Neutrophil # : x  Auto Lymphocyte # : x  Auto Monocyte # : x  Auto Eosinophil # : x  Auto Basophil # : x  Auto Neutrophil % : x  Auto Lymphocyte % : x  Auto Monocyte % : x  Auto Eosinophil % : x  Auto Basophil % : x    13 Oct 2024 07:10    134    |  104    |  19     ----------------------------<  82     4.1     |  18     |  0.71     Ca    8.3        13 Oct 2024 07:10        Urinalysis Basic - ( 13 Oct 2024 07:10 )    Color: x / Appearance: x / SG: x / pH: x  Gluc: 82 mg/dL / Ketone: x  / Bili: x / Urobili: x   Blood: x / Protein: x / Nitrite: x   Leuk Esterase: x / RBC: x / WBC x   Sq Epi: x / Non Sq Epi: x / Bacteria: x      CAPILLARY BLOOD GLUCOSE            Urinalysis with Rflx Culture (collected 10-07-24 @ 19:00)    Culture - Blood (collected 10-07-24 @ 01:40)  Source: .Blood BLOOD  Final Report (10-12-24 @ 09:00):    No growth at 5 days    Culture - Blood (collected 10-07-24 @ 01:35)  Source: .Blood BLOOD  Final Report (10-12-24 @ 09:00):    No growth at 5 days        RADIOLOGY & ADDITIONAL TESTS:    Personally reviewed.     Consultant(s) Notes Reviewed:  [x] YES  [ ] NO    
CHAVA JOSEPH  89y  Female      Patient is a 89y old  Female who presents with a chief complaint of Right superior/inferior pubic rami fracture (11 Oct 2024 10:48)      INTERVAL HPI/OVERNIGHT EVENTS: Pt seen and examined at bed side today. c/o pain in R heel. denied any other complains. denied fever, chills, sob, cp or headache.    Na is better today.           T(C): 36.6 (10-11-24 @ 12:18), Max: 36.6 (10-10-24 @ 16:59)  HR: 64 (10-11-24 @ 12:29) (64 - 74)  BP: 108/57 (10-11-24 @ 12:29) (98/61 - 121/71)  RR: 19 (10-11-24 @ 12:18) (17 - 19)  SpO2: 92% (10-11-24 @ 12:18) (91% - 93%)  Wt(kg): --Vital Signs Last 24 Hrs  T(C): 36.6 (11 Oct 2024 12:18), Max: 36.6 (10 Oct 2024 16:59)  T(F): 97.8 (11 Oct 2024 12:18), Max: 97.9 (10 Oct 2024 16:59)  HR: 64 (11 Oct 2024 12:29) (64 - 74)  BP: 108/57 (11 Oct 2024 12:29) (98/61 - 121/71)  BP(mean): --  RR: 19 (11 Oct 2024 12:18) (17 - 19)  SpO2: 92% (11 Oct 2024 12:18) (91% - 93%)    Parameters below as of 11 Oct 2024 12:18  Patient On (Oxygen Delivery Method): room air        PHYSICAL EXAM:  General: elderly female, frail,  laying comfortably in bed, NAD  Neurology: A&Ox3- easily confused, nonfocal, JOAQUIN x 4  Respiratory: CTA B/L, no w/r/r  CV: RRR, +S1S2  Abdominal: Soft, NT, ND +BS  Extremities: No edema, + peripheral pulses, no calf tenderness. Right foot covered in soft boots         Consultant(s) Notes Reviewed:  [x ] YES  [ ] NO  Care Discussed with Consultants/Other Providers [ x] YES  [ ] NO    LABS:                        10.9   10.33 )-----------( 220      ( 11 Oct 2024 07:14 )             32.0     10-11    134[L]  |  102  |  18  ----------------------------<  91  3.8   |  22  |  0.76    Ca    8.3[L]      11 Oct 2024 07:14        Urinalysis Basic - ( 11 Oct 2024 07:14 )    Color: x / Appearance: x / SG: x / pH: x  Gluc: 91 mg/dL / Ketone: x  / Bili: x / Urobili: x   Blood: x / Protein: x / Nitrite: x   Leuk Esterase: x / RBC: x / WBC x   Sq Epi: x / Non Sq Epi: x / Bacteria: x      CAPILLARY BLOOD GLUCOSE            Urinalysis Basic - ( 11 Oct 2024 07:14 )    Color: x / Appearance: x / SG: x / pH: x  Gluc: 91 mg/dL / Ketone: x  / Bili: x / Urobili: x   Blood: x / Protein: x / Nitrite: x   Leuk Esterase: x / RBC: x / WBC x   Sq Epi: x / Non Sq Epi: x / Bacteria: x        RADIOLOGY & ADDITIONAL TESTS:    Imaging Personally Reviewed:  [ ] YES  [ ] NO    HEALTH ISSUES - PROBLEM Dx:  Pelvic fracture    HTN (hypertension)    Afib    Need for prophylactic measure    Mood disorder    Leukocytosis    Hyponatremia        
INTERVAL HPI/OVERNIGHT EVENTS: Patient seen and examined at bedside. States she is overall feeling fine. Denies any chest pain, SOB, abd pain at this time. States she gets pain in the heels of her feet- burning sensation that comes and goes. Also complains of pain in her hips and states had difficulty walking with PT this AM.     ROS: All other review of systems is negative unless indicated above.    MEDICATIONS  (STANDING):  aMIOdarone    Tablet 100 milliGRAM(s) Oral daily  amLODIPine   Tablet 5 milliGRAM(s) Oral daily  busPIRone 7.5 milliGRAM(s) Oral two times a day  metoprolol tartrate 25 milliGRAM(s) Oral two times a day  ondansetron Injectable 4 milliGRAM(s) IV Push once  potassium chloride    Tablet ER 20 milliEquivalent(s) Oral once  sodium chloride 2 Gram(s) Oral once    MEDICATIONS  (PRN):  acetaminophen     Tablet .. 650 milliGRAM(s) Oral every 6 hours PRN Temp greater or equal to 38C (100.4F), Mild Pain (1 - 3)  hydrocortisone 2.5% Rectal Cream 1 Application(s) Rectal daily PRN Rectal pain  traMADol 50 milliGRAM(s) Oral every 6 hours PRN Severe Pain (7 - 10)  traMADol 25 milliGRAM(s) Oral every 6 hours PRN Moderate Pain (4 - 6)      Allergies    Allergy Status Unknown  No Known Allergies    Intolerances              Vital Signs Last 24 Hrs  T(C): 36.5 (09 Oct 2024 12:34), Max: 36.6 (08 Oct 2024 17:30)  T(F): 97.7 (09 Oct 2024 12:34), Max: 97.9 (08 Oct 2024 17:30)  HR: 71 (09 Oct 2024 12:34) (67 - 73)  BP: 114/70 (09 Oct 2024 12:34) (104/67 - 128/69)  BP(mean): --  RR: 18 (09 Oct 2024 12:34) (17 - 18)  SpO2: 96% (09 Oct 2024 12:34) (92% - 96%)    Parameters below as of 09 Oct 2024 12:34  Patient On (Oxygen Delivery Method): room air        10-08 @ 07:01  -  10-09 @ 07:00  --------------------------------------------------------  IN: 420 mL / OUT: 500 mL / NET: -80 mL      Physical Exam:  General: sitting comfortably in chair, NAD  Neurology: A&Ox3, nonfocal, JOAQUIN x 4  Respiratory: CTA B/L, no w/r/r  CV: RRR, +S1S2  Abdominal: Soft, NT, ND +BS  Extremities: No edema, + peripheral pulses, no calf tenderness      LABS:                        10.9   12.41 )-----------( 179      ( 09 Oct 2024 07:51 )             31.3     10-09    128[L]  |  98  |  17  ----------------------------<  82  3.5   |  22  |  0.60    Ca    8.7      09 Oct 2024 07:51        Urinalysis Basic - ( 09 Oct 2024 07:51 )    Color: x / Appearance: x / SG: x / pH: x  Gluc: 82 mg/dL / Ketone: x  / Bili: x / Urobili: x   Blood: x / Protein: x / Nitrite: x   Leuk Esterase: x / RBC: x / WBC x   Sq Epi: x / Non Sq Epi: x / Bacteria: x        RADIOLOGY & ADDITIONAL TESTS:
INTERVAL HPI/OVERNIGHT EVENTS:  Patient seen and examined at bedside. Patient AAOx3 on my encounter but easily forgetful. Patient complaining of pain in her bilateral hips, rates it 9/10 on pain scale, but appears comfortable resting in bed. Patient denies any chest pain, SOB, abd pain at this time. States she was bending over to pick something up that fell from the fridge when she tripped and fell on the floor and was unable to get up. Denies any LOC or head-strike. Discussed plan of care with sonTomer, at bedside and with son, Joshua, over phone.    ROS: All other review of systems is negative unless indicated above.    MEDICATIONS  (STANDING):  aMIOdarone    Tablet 100 milliGRAM(s) Oral daily  amLODIPine   Tablet 5 milliGRAM(s) Oral daily  busPIRone 7.5 milliGRAM(s) Oral two times a day  metoprolol tartrate 25 milliGRAM(s) Oral two times a day  ondansetron Injectable 4 milliGRAM(s) IV Push once  traMADol 25 milliGRAM(s) Oral once    MEDICATIONS  (PRN):  acetaminophen     Tablet .. 650 milliGRAM(s) Oral every 6 hours PRN Temp greater or equal to 38C (100.4F), Mild Pain (1 - 3)  hydrocortisone 2.5% Rectal Cream 1 Application(s) Rectal daily PRN Rectal pain  traMADol 50 milliGRAM(s) Oral every 6 hours PRN Severe Pain (7 - 10)  traMADol 25 milliGRAM(s) Oral every 6 hours PRN Moderate Pain (4 - 6)      Allergies    Allergy Status Unknown  No Known Allergies    Intolerances              Vital Signs Last 24 Hrs  T(C): 36.4 (07 Oct 2024 08:06), Max: 36.7 (06 Oct 2024 19:52)  T(F): 97.5 (07 Oct 2024 08:06), Max: 98 (06 Oct 2024 19:52)  HR: 81 (07 Oct 2024 17:17) (72 - 89)  BP: 128/80 (07 Oct 2024 17:17) (114/68 - 146/81)  BP(mean): --  RR: 17 (07 Oct 2024 08:06) (16 - 18)  SpO2: 97% (07 Oct 2024 08:06) (96% - 97%)    Parameters below as of 07 Oct 2024 06:06  Patient On (Oxygen Delivery Method): room air        Physical Exam:  General: laying comfortably in bed, NAD  Neurology: A&Ox3, nonfocal, JOAQUIN x 4  Respiratory: CTA B/L, no w/r/r  CV: RRR, +S1S2  Abdominal: Soft, NT, ND +BS  Extremities: No edema, + peripheral pulses, no calf tenderness      LABS:                        11.2   15.01 )-----------( 232      ( 07 Oct 2024 06:40 )             34.6     10-07    133[L]  |  101  |  19  ----------------------------<  120[H]  3.8   |  22  |  0.71    Ca    8.6      07 Oct 2024 06:40    TPro  7.4  /  Alb  3.5  /  TBili  0.9  /  DBili  x   /  AST  29  /  ALT  37  /  AlkPhos  71  10-07    PT/INR - ( 07 Oct 2024 06:40 )   PT: 11.8 sec;   INR: 1.01 ratio         PTT - ( 07 Oct 2024 06:40 )  PTT:29.0 sec  Urinalysis Basic - ( 07 Oct 2024 06:40 )    Color: x / Appearance: x / SG: x / pH: x  Gluc: 120 mg/dL / Ketone: x  / Bili: x / Urobili: x   Blood: x / Protein: x / Nitrite: x   Leuk Esterase: x / RBC: x / WBC x   Sq Epi: x / Non Sq Epi: x / Bacteria: x        RADIOLOGY & ADDITIONAL TESTS:
Patient is a 89y old  Female who presents with a chief complaint of Right superior/inferior pubic rami fracture (12 Oct 2024 11:55)      INTERVAL HPI/OVERNIGHT EVENTS: Patient seen and examined at bedside. No overnight events. Tolerating diet. Denies fever, chills, chest pain, shortness of breath, abdominal pain, nausea/vomiting, headache.    MEDICATIONS  (STANDING):  acetaminophen     Tablet .. 1000 milliGRAM(s) Oral every 8 hours  aMIOdarone    Tablet 100 milliGRAM(s) Oral daily  amLODIPine   Tablet 5 milliGRAM(s) Oral daily  busPIRone 7.5 milliGRAM(s) Oral two times a day  enoxaparin Injectable 40 milliGRAM(s) SubCutaneous every 24 hours  gabapentin 100 milliGRAM(s) Oral three times a day  metoprolol tartrate 25 milliGRAM(s) Oral two times a day  ondansetron Injectable 4 milliGRAM(s) IV Push once  oxyCODONE    IR 2.5 milliGRAM(s) Oral every 8 hours    MEDICATIONS  (PRN):  hydrocortisone 2.5% Rectal Cream 1 Application(s) Rectal daily PRN Rectal pain      Allergies    Allergy Status Unknown  No Known Allergies    Intolerances        REVIEW OF SYSTEMS:  CONSTITUTIONAL: No fever or chills  HEENT:  No headache, no sore throat  RESPIRATORY: No cough, wheezing, or shortness of breath  CARDIOVASCULAR: No chest pain, palpitations  GASTROINTESTINAL: No abd pain, nausea, vomiting, or diarrhea  GENITOURINARY: No dysuria, frequency, or hematuria  NEUROLOGICAL: no focal weakness or dizziness  MUSCULOSKELETAL: no myalgias     Vital Signs Last 24 Hrs  T(C): 36.6 (12 Oct 2024 13:45), Max: 37 (11 Oct 2024 20:06)  T(F): 97.8 (12 Oct 2024 13:45), Max: 98.6 (11 Oct 2024 20:06)  HR: 65 (12 Oct 2024 13:45) (65 - 71)  BP: 99/65 (12 Oct 2024 13:45) (99/65 - 112/67)  BP(mean): --  RR: 18 (12 Oct 2024 13:45) (18 - 20)  SpO2: 93% (12 Oct 2024 13:45) (91% - 95%)    Parameters below as of 12 Oct 2024 13:45  Patient On (Oxygen Delivery Method): room air        PHYSICAL EXAM:  GENERAL: NAD  HEENT:  anicteric, moist mucous membranes  CHEST/LUNG:  CTA b/l, no rales, wheezes, or rhonchi  HEART:  RRR, S1, S2  ABDOMEN:  BS+, soft, nontender, nondistended  EXTREMITIES: no edema, cyanosis, or calf tenderness  NERVOUS SYSTEM: awake, alert    LABS:                        10.9   9.44  )-----------( 251      ( 12 Oct 2024 06:06 )             32.4     CBC Full  -  ( 12 Oct 2024 06:06 )  WBC Count : 9.44 K/uL  Hemoglobin : 10.9 g/dL  Hematocrit : 32.4 %  Platelet Count - Automated : 251 K/uL  Mean Cell Volume : 95.9 fl  Mean Cell Hemoglobin : 32.2 pg  Mean Cell Hemoglobin Concentration : 33.6 gm/dL  Auto Neutrophil # : x  Auto Lymphocyte # : x  Auto Monocyte # : x  Auto Eosinophil # : x  Auto Basophil # : x  Auto Neutrophil % : x  Auto Lymphocyte % : x  Auto Monocyte % : x  Auto Eosinophil % : x  Auto Basophil % : x    12 Oct 2024 06:06    136    |  103    |  18     ----------------------------<  87     3.8     |  26     |  0.76     Ca    8.4        12 Oct 2024 06:06        Urinalysis Basic - ( 12 Oct 2024 06:06 )    Color: x / Appearance: x / SG: x / pH: x  Gluc: 87 mg/dL / Ketone: x  / Bili: x / Urobili: x   Blood: x / Protein: x / Nitrite: x   Leuk Esterase: x / RBC: x / WBC x   Sq Epi: x / Non Sq Epi: x / Bacteria: x      CAPILLARY BLOOD GLUCOSE            Urinalysis with Rflx Culture (collected 10-07-24 @ 19:00)    Culture - Blood (collected 10-07-24 @ 01:40)  Source: .Blood BLOOD  Final Report (10-12-24 @ 09:00):    No growth at 5 days    Culture - Blood (collected 10-07-24 @ 01:35)  Source: .Blood BLOOD  Final Report (10-12-24 @ 09:00):    No growth at 5 days        RADIOLOGY & ADDITIONAL TESTS:    Personally reviewed.     Consultant(s) Notes Reviewed:  [x] YES  [ ] NO    
Patient is a 89y old  Female who presents with a chief complaint of Right superior/inferior pubic rami fracture (08 Oct 2024 10:21)      INTERVAL HPI/OVERNIGHT EVENTS: Patient seen and examined at bedside. No overnight events.  Pain controlled  Plan for CT cystogram today - sotomayor to be placed for imaging    MEDICATIONS  (STANDING):  aMIOdarone    Tablet 100 milliGRAM(s) Oral daily  amLODIPine   Tablet 5 milliGRAM(s) Oral daily  busPIRone 7.5 milliGRAM(s) Oral two times a day  metoprolol tartrate 25 milliGRAM(s) Oral two times a day  ondansetron Injectable 4 milliGRAM(s) IV Push once    MEDICATIONS  (PRN):  acetaminophen     Tablet .. 650 milliGRAM(s) Oral every 6 hours PRN Temp greater or equal to 38C (100.4F), Mild Pain (1 - 3)  hydrocortisone 2.5% Rectal Cream 1 Application(s) Rectal daily PRN Rectal pain  traMADol 50 milliGRAM(s) Oral every 6 hours PRN Severe Pain (7 - 10)  traMADol 25 milliGRAM(s) Oral every 6 hours PRN Moderate Pain (4 - 6)      Allergies    Allergy Status Unknown  No Known Allergies    Intolerances        REVIEW OF SYSTEMS:  CONSTITUTIONAL: No fever or chills  CARDIOVASCULAR: No chest pain, palpitations  GASTROINTESTINAL: No abd pain, nausea, vomiting    Vital Signs Last 24 Hrs  T(C): 36.7 (08 Oct 2024 11:45), Max: 36.7 (07 Oct 2024 21:40)  T(F): 98 (08 Oct 2024 11:45), Max: 98 (07 Oct 2024 21:40)  HR: 69 (08 Oct 2024 11:45) (69 - 81)  BP: 127/67 (08 Oct 2024 11:45) (104/65 - 148/77)  BP(mean): --  RR: 17 (08 Oct 2024 11:45) (17 - 18)  SpO2: 98% (08 Oct 2024 11:45) (94% - 98%)    Parameters below as of 08 Oct 2024 11:45  Patient On (Oxygen Delivery Method): room air      I&O's Summary    08 Oct 2024 07:01  -  08 Oct 2024 16:00  --------------------------------------------------------  IN: 420 mL / OUT: 0 mL / NET: 420 mL      BMI (kg/m2): 27.3 (10-06-24 @ 19:52)    PHYSICAL EXAM:  GENERAL: NAD  HEENT:  AT/NC, anicteric, moist mucous membranes, EOMI, PERRL, no lid-lag, conjunctiva and sclera clear  CHEST/LUNG:  CTA b/l, no rales, wheezes, or rhonchi,  normal respiratory effort, no intercostal retractions  HEART:  RRR, S1, S2, no murmurs; no pitting edema  ABDOMEN:  BS+, soft, nontender, nondistended  MSK/EXTREMITIES: palpable peripheral pulses, no clubbing or cyanosis  NERVOUS SYSTEM: answers questions and follows commands appropriately, A&Ox3 grossly moves all extremities   PSYCH: Appropriate affect, Alert & Awake; Good judgement      LABS: Personally reviewed  CBC                        11.4   16.36 )-----------( 201      ( 08 Oct 2024 11:37 )             32.6     CMP  10-08    126  |  95  |  12  ----------------------------<  97  3.9   |  19  |  0.69    Ca    8.6      08 Oct 2024 11:37    TPro  7.4  /  Alb  3.5  /  TBili  0.9  /  DBili  x   /  AST  29  /  ALT  37  /  AlkPhos  71  10-07          PT/INR - ( 07 Oct 2024 06:40 )   PT: 11.8 sec;   INR: 1.01 ratio         PTT - ( 07 Oct 2024 06:40 )  PTT:29.0 sec          10-07 Chol 225 mg/dL LDL -- HDL 58 mg/dL Trig 63 mg/dL                  Urinalysis Basic - ( 08 Oct 2024 11:37 )    Color: x / Appearance: x / SG: x / pH: x  Gluc: 97 mg/dL / Ketone: x  / Bili: x / Urobili: x   Blood: x / Protein: x / Nitrite: x   Leuk Esterase: x / RBC: x / WBC x   Sq Epi: x / Non Sq Epi: x / Bacteria: x        Culture - Blood (collected 07 Oct 2024 01:40)  Source: .Blood BLOOD  Preliminary Report (08 Oct 2024 09:01):    No growth at 24 hours    Culture - Blood (collected 07 Oct 2024 01:35)  Source: .Blood BLOOD  Preliminary Report (08 Oct 2024 09:01):    No growth at 24 hours            Urinalysis with Rflx Culture (collected 10-07-24 @ 19:00)    Culture - Blood (collected 10-07-24 @ 01:40)  Source: .Blood BLOOD  Preliminary Report (10-08-24 @ 09:01):    No growth at 24 hours    Culture - Blood (collected 10-07-24 @ 01:35)  Source: .Blood BLOOD  Preliminary Report (10-08-24 @ 09:01):    No growth at 24 hours        RADIOLOGY & ADDITIONAL TESTS: Personally reviewed.     Consultant(s) Notes Reviewed:  [x] YES  [ ] NO   Discussed with JOSELUIS/AMY, RN    
INTERVAL HPI/OVERNIGHT EVENTS:  Patient seen and examined at bedside.  States she is feeling fine, but still endorsing pain in her hips and back. Denies any chest pain, SOB, abd pain at this time.    ROS: All other review of systems is negative unless indicated above.    MEDICATIONS  (STANDING):  acetaminophen     Tablet .. 1000 milliGRAM(s) Oral every 8 hours  aMIOdarone    Tablet 100 milliGRAM(s) Oral daily  amLODIPine   Tablet 5 milliGRAM(s) Oral daily  busPIRone 7.5 milliGRAM(s) Oral two times a day  enoxaparin Injectable 40 milliGRAM(s) SubCutaneous every 24 hours  gabapentin 100 milliGRAM(s) Oral three times a day  metoprolol tartrate 25 milliGRAM(s) Oral two times a day  ondansetron Injectable 4 milliGRAM(s) IV Push once  oxyCODONE    IR 2.5 milliGRAM(s) Oral every 8 hours    MEDICATIONS  (PRN):  hydrocortisone 2.5% Rectal Cream 1 Application(s) Rectal daily PRN Rectal pain      Allergies    Allergy Status Unknown  No Known Allergies    Intolerances            Vital Signs Last 24 Hrs  T(C): 36.6 (10 Oct 2024 16:59), Max: 36.7 (10 Oct 2024 14:44)  T(F): 97.9 (10 Oct 2024 16:59), Max: 98 (10 Oct 2024 14:44)  HR: 74 (10 Oct 2024 16:59) (68 - 74)  BP: 112/62 (10 Oct 2024 16:59) (104/59 - 118/74)  BP(mean): --  RR: 18 (10 Oct 2024 16:59) (18 - 18)  SpO2: 91% (10 Oct 2024 16:59) (91% - 98%)    Parameters below as of 10 Oct 2024 16:59  Patient On (Oxygen Delivery Method): room air        10-09 @ 07:01  -  10-10 @ 07:00  --------------------------------------------------------  IN: 0 mL / OUT: 500 mL / NET: -500 mL    10-10 @ 07:01  -  10-10 @ 20:14  --------------------------------------------------------  IN: 0 mL / OUT: 300 mL / NET: -300 mL        Physical Exam:  General: laying comfortably in bed, NAD  Neurology: A&Ox3- easily confused, nonfocal, JOAQUIN x 4  Respiratory: CTA B/L, no w/r/r  CV: RRR, +S1S2  Abdominal: Soft, NT, ND +BS  Extremities: No edema, + peripheral pulses, no calf tenderness      LABS:                        11.3   10.15 )-----------( 184      ( 10 Oct 2024 07:55 )             33.7     10-10    127[L]  |  100  |  18  ----------------------------<  78  4.1   |  20[L]  |  0.66    Ca    8.7      10 Oct 2024 07:55        Urinalysis Basic - ( 10 Oct 2024 07:55 )    Color: x / Appearance: x / SG: x / pH: x  Gluc: 78 mg/dL / Ketone: x  / Bili: x / Urobili: x   Blood: x / Protein: x / Nitrite: x   Leuk Esterase: x / RBC: x / WBC x   Sq Epi: x / Non Sq Epi: x / Bacteria: x        RADIOLOGY & ADDITIONAL TESTS:

## 2024-10-14 NOTE — PROGRESS NOTE ADULT - ASSESSMENT
Hyponatremia, likely SIADH  s/p Fall, pelvis fracture  Hypertension  Hypokalemia    10/09/24: Check urine studies. PO fluid restriction. PO salt tabs x 1 dose. Trend BP and titrate BP meds as needed. Ortho follow up. Pain management.   PRN Potassium supplementation. Encourage PO intake as tolerated.   10/10/24: Urine studies not sent. Will redose salt tabs. PO fluid restriction. Avoid hypotonic fluids. 
Hyponatremia, likely SIADH  s/p Fall, pelvis fracture  Hypertension  Hypokalemia    10/09/24: Check urine studies. PO fluid restriction. PO salt tabs x 1 dose. Trend BP and titrate BP meds as needed. Ortho follow up. Pain management.   PRN Potassium supplementation. Encourage PO intake as tolerated.   10/10/24: Urine studies not sent. Will redose salt tabs. PO fluid restriction. Avoid hypotonic fluids.   10/11/24: Improved sodium levels. Avoid excessive PO fluid intake. Monitor sodium levels. BP well controlled. 
Hyponatremia, likely SIADH  s/p Fall, pelvis fracture  Hypertension  Hypokalemia    Improved and stable sodium levels. To continue current meds. Avoid excessive PO fluid intake.   BP low. D/c amlodipine and monitor. Will follow electrolytes and renal function trend. 
89 year old female with PMH of Afib (no AC), HTN, dementia, [place], admitted s/p mechanical fall with multiple Right-sided pelvic fractures, found to have a pelvic sidewall hematoma abutting the bladder on CT.  US without evidence for hematoma; Low suspicion for bladder trauma  VSS  Monitor H/H  No acute urologic issues at this point  Will sign off  Reconsult prn  
Hyponatremia, likely SIADH  s/p Fall, pelvis fracture  Hypertension  Hypokalemia    Stable sodium levels. To continue current meds. Avoid excessive PO fluid intake.   BP labile. To continue current meds. D/c planning.  
89 y.o. F with PMHx of Afib not on ac, HTN, dementia (A&Ox3) presents to the ED s/p fall. Admitted for right pelvic fractures. 

## 2024-10-14 NOTE — SOCIAL WORK PROGRESS NOTE - NSSWPROGRESSNOTE_GEN_ALL_CORE
Patient to be discharged to [facility] via [facility] at 2:oo. Steve Burrell in agreement. Copy of chart to follow

## 2024-10-14 NOTE — DISCHARGE NOTE NURSING/CASE MANAGEMENT/SOCIAL WORK - NSDCVIVACCINE_GEN_ALL_CORE_FT
Tdap; 24-Mar-2019 16:36; Yara Rowe (RN); [employer]; [id number] (Exp. Date: 26-Feb-2021); IntraMuscular; Deltoid Left.; 0.5 milliLiter(s); VIS (VIS Published: 09-May-2013, VIS Presented: 24-Mar-2019);   Tdap; 20-Feb-2023 14:39; Melani Salinas (RN); [employer]; [id number] (Exp. Date: 01-Jun-2024); IntraMuscular; Deltoid Left.; 0.5 milliLiter(s); VIS (VIS Published: 09-May-2013, VIS Presented: 20-Feb-2023);

## 2024-10-14 NOTE — DISCHARGE NOTE NURSING/CASE MANAGEMENT/SOCIAL WORK - PATIENT PORTAL LINK FT
You can access the FollowMyHealth Patient Portal offered by [facility] by registering at the following website: [email or web address]. By joining [employer]’s FollowMyHealth portal, you will also be able to view your health information using other applications (apps) compatible with our system.

## 2024-11-26 PROCEDURE — 97162 PT EVAL MOD COMPLEX 30 MIN: CPT

## 2024-11-26 PROCEDURE — 72131 CT LUMBAR SPINE W/O DYE: CPT | Mod: MC

## 2024-11-26 PROCEDURE — 85025 COMPLETE CBC W/AUTO DIFF WBC: CPT

## 2024-11-26 PROCEDURE — 71045 X-RAY EXAM CHEST 1 VIEW: CPT

## 2024-11-26 PROCEDURE — 83935 ASSAY OF URINE OSMOLALITY: CPT

## 2024-11-26 PROCEDURE — 81001 URINALYSIS AUTO W/SCOPE: CPT

## 2024-11-26 PROCEDURE — 70450 CT HEAD/BRAIN W/O DYE: CPT | Mod: MC

## 2024-11-26 PROCEDURE — 83036 HEMOGLOBIN GLYCOSYLATED A1C: CPT

## 2024-11-26 PROCEDURE — 80061 LIPID PANEL: CPT

## 2024-11-26 PROCEDURE — 72190 X-RAY EXAM OF PELVIS: CPT

## 2024-11-26 PROCEDURE — 72125 CT NECK SPINE W/O DYE: CPT | Mod: MC

## 2024-11-26 PROCEDURE — 76770 US EXAM ABDO BACK WALL COMP: CPT

## 2024-11-26 PROCEDURE — 93005 ELECTROCARDIOGRAM TRACING: CPT

## 2024-11-26 PROCEDURE — 36415 COLL VENOUS BLD VENIPUNCTURE: CPT

## 2024-11-26 PROCEDURE — 72192 CT PELVIS W/O DYE: CPT | Mod: MC

## 2024-11-26 PROCEDURE — 80048 BASIC METABOLIC PNL TOTAL CA: CPT

## 2024-11-26 PROCEDURE — 97116 GAIT TRAINING THERAPY: CPT

## 2024-11-26 PROCEDURE — 76376 3D RENDER W/INTRP POSTPROCES: CPT

## 2024-11-26 PROCEDURE — 85610 PROTHROMBIN TIME: CPT

## 2024-11-26 PROCEDURE — 85730 THROMBOPLASTIN TIME PARTIAL: CPT

## 2024-11-26 PROCEDURE — 74176 CT ABD & PELVIS W/O CONTRAST: CPT | Mod: MC

## 2024-11-26 PROCEDURE — 80053 COMPREHEN METABOLIC PANEL: CPT

## 2024-11-26 PROCEDURE — 99285 EMERGENCY DEPT VISIT HI MDM: CPT

## 2024-11-26 PROCEDURE — 87040 BLOOD CULTURE FOR BACTERIA: CPT

## 2024-11-26 PROCEDURE — 84300 ASSAY OF URINE SODIUM: CPT

## 2024-11-26 PROCEDURE — 85027 COMPLETE CBC AUTOMATED: CPT

## 2024-12-24 NOTE — PATIENT PROFILE ADULT - OVER THE PAST TWO WEEKS, HAVE YOU FELT LITTLE INTEREST OR PLEASURE IN DOING THINGS?
Lisdexamfetamine Dimesylate 10 mg    Last filled:  11/20/2024  Last med check: 8/15/2024    Appt 12/27/2024    Please send to [facility]             no

## 2025-03-24 NOTE — ED PROVIDER NOTE - ATTENDING SHARED VISIT SELECTORS
To [facility]     No protocol for requested medication.    Medication:    Disp Refills Start End    amphetamine-dextroamphetamine (ADDERALL XR) 30 MG 24 hr capsule 30 capsule 0 2/28/2025 --    Sig - Route: Take 1 capsule by mouth daily. - Oral    Sent to pharmacy as: Amphetamine-Dextroamphet ER 30 MG Oral Capsule Extended Release 24 Hour (ADDERALL XR)      Last office visit date: 12.31.24    Pharmacy: [facility] [place] [facility] [place] [place] - [address] [place] [address] RD    Order pended, routed to clinician for review.      Medical Decision Making

## 2025-05-15 ENCOUNTER — APPOINTMENT (OUTPATIENT)
Dept: OTOLARYNGOLOGY | Facility: CLINIC | Age: 89
End: 2025-05-15
Payer: MEDICARE

## 2025-05-15 ENCOUNTER — NON-APPOINTMENT (OUTPATIENT)
Age: 89
End: 2025-05-15

## 2025-05-15 VITALS
WEIGHT: 120 LBS | SYSTOLIC BLOOD PRESSURE: 115 MMHG | HEART RATE: 71 BPM | DIASTOLIC BLOOD PRESSURE: 73 MMHG | HEIGHT: 64 IN | BODY MASS INDEX: 20.49 KG/M2

## 2025-05-15 DIAGNOSIS — H90.3 SENSORINEURAL HEARING LOSS, BILATERAL: ICD-10-CM

## 2025-05-15 DIAGNOSIS — Z78.9 OTHER SPECIFIED HEALTH STATUS: ICD-10-CM

## 2025-05-15 DIAGNOSIS — Z86.79 PERSONAL HISTORY OF OTHER DISEASES OF THE CIRCULATORY SYSTEM: ICD-10-CM

## 2025-05-15 DIAGNOSIS — H93.293 OTHER ABNORMAL AUDITORY PERCEPTIONS, BILATERAL: ICD-10-CM

## 2025-05-15 DIAGNOSIS — H61.23 IMPACTED CERUMEN, BILATERAL: ICD-10-CM

## 2025-05-15 PROCEDURE — 92557 COMPREHENSIVE HEARING TEST: CPT

## 2025-05-15 PROCEDURE — 92567 TYMPANOMETRY: CPT

## 2025-05-15 PROCEDURE — G0268 REMOVAL OF IMPACTED WAX MD: CPT

## 2025-05-15 PROCEDURE — 99204 OFFICE O/P NEW MOD 45 MIN: CPT | Mod: 25
